# Patient Record
Sex: MALE | Race: WHITE | HISPANIC OR LATINO | Employment: FULL TIME | URBAN - METROPOLITAN AREA
[De-identification: names, ages, dates, MRNs, and addresses within clinical notes are randomized per-mention and may not be internally consistent; named-entity substitution may affect disease eponyms.]

---

## 2020-04-16 ENCOUNTER — APPOINTMENT (OUTPATIENT)
Dept: RADIOLOGY | Facility: MEDICAL CENTER | Age: 60
DRG: 065 | End: 2020-04-16
Attending: EMERGENCY MEDICINE
Payer: OTHER MISCELLANEOUS

## 2020-04-16 ENCOUNTER — HOSPITAL ENCOUNTER (OUTPATIENT)
Dept: RADIOLOGY | Facility: MEDICAL CENTER | Age: 60
End: 2020-04-16
Payer: OTHER MISCELLANEOUS

## 2020-04-16 ENCOUNTER — HOSPITAL ENCOUNTER (INPATIENT)
Facility: MEDICAL CENTER | Age: 60
LOS: 12 days | DRG: 065 | End: 2020-04-28
Attending: EMERGENCY MEDICINE | Admitting: INTERNAL MEDICINE
Payer: OTHER MISCELLANEOUS

## 2020-04-16 DIAGNOSIS — I60.9 SUBARACHNOID HEMORRHAGE (HCC): ICD-10-CM

## 2020-04-16 DIAGNOSIS — I10 HYPERTENSION, UNSPECIFIED TYPE: ICD-10-CM

## 2020-04-16 DIAGNOSIS — E87.1 HYPONATREMIA: ICD-10-CM

## 2020-04-16 PROBLEM — I16.1 HYPERTENSIVE EMERGENCY: Status: ACTIVE | Noted: 2020-04-16

## 2020-04-16 LAB
ALBUMIN SERPL BCP-MCNC: 4.4 G/DL (ref 3.2–4.9)
ALBUMIN/GLOB SERPL: 1.3 G/DL
ALP SERPL-CCNC: 108 U/L (ref 30–99)
ALT SERPL-CCNC: 32 U/L (ref 2–50)
ANION GAP SERPL CALC-SCNC: 14 MMOL/L (ref 7–16)
APTT PPP: 26.8 SEC (ref 24.7–36)
AST SERPL-CCNC: 21 U/L (ref 12–45)
BASOPHILS # BLD AUTO: 0.3 % (ref 0–1.8)
BASOPHILS # BLD: 0.03 K/UL (ref 0–0.12)
BILIRUB SERPL-MCNC: 0.5 MG/DL (ref 0.1–1.5)
BUN SERPL-MCNC: 22 MG/DL (ref 8–22)
CALCIUM SERPL-MCNC: 9.3 MG/DL (ref 8.5–10.5)
CHLORIDE SERPL-SCNC: 101 MMOL/L (ref 96–112)
CO2 SERPL-SCNC: 24 MMOL/L (ref 20–33)
CREAT SERPL-MCNC: 0.77 MG/DL (ref 0.5–1.4)
EOSINOPHIL # BLD AUTO: 0.01 K/UL (ref 0–0.51)
EOSINOPHIL NFR BLD: 0.1 % (ref 0–6.9)
ERYTHROCYTE [DISTWIDTH] IN BLOOD BY AUTOMATED COUNT: 42.3 FL (ref 35.9–50)
GLOBULIN SER CALC-MCNC: 3.3 G/DL (ref 1.9–3.5)
GLUCOSE SERPL-MCNC: 150 MG/DL (ref 65–99)
HCT VFR BLD AUTO: 44.4 % (ref 42–52)
HGB BLD-MCNC: 14.4 G/DL (ref 14–18)
IMM GRANULOCYTES # BLD AUTO: 0.04 K/UL (ref 0–0.11)
IMM GRANULOCYTES NFR BLD AUTO: 0.4 % (ref 0–0.9)
INR PPP: 0.91 (ref 0.87–1.13)
LYMPHOCYTES # BLD AUTO: 0.77 K/UL (ref 1–4.8)
LYMPHOCYTES NFR BLD: 7.8 % (ref 22–41)
MCH RBC QN AUTO: 26.8 PG (ref 27–33)
MCHC RBC AUTO-ENTMCNC: 32.4 G/DL (ref 33.7–35.3)
MCV RBC AUTO: 82.7 FL (ref 81.4–97.8)
MONOCYTES # BLD AUTO: 0.41 K/UL (ref 0–0.85)
MONOCYTES NFR BLD AUTO: 4.1 % (ref 0–13.4)
NEUTROPHILS # BLD AUTO: 8.63 K/UL (ref 1.82–7.42)
NEUTROPHILS NFR BLD: 87.3 % (ref 44–72)
NRBC # BLD AUTO: 0 K/UL
NRBC BLD-RTO: 0 /100 WBC
PLATELET # BLD AUTO: 271 K/UL (ref 164–446)
PMV BLD AUTO: 9.5 FL (ref 9–12.9)
POTASSIUM SERPL-SCNC: 4 MMOL/L (ref 3.6–5.5)
PROT SERPL-MCNC: 7.7 G/DL (ref 6–8.2)
PROTHROMBIN TIME: 12.4 SEC (ref 12–14.6)
RBC # BLD AUTO: 5.37 M/UL (ref 4.7–6.1)
SODIUM SERPL-SCNC: 139 MMOL/L (ref 135–145)
WBC # BLD AUTO: 9.9 K/UL (ref 4.8–10.8)

## 2020-04-16 PROCEDURE — 85025 COMPLETE CBC W/AUTO DIFF WBC: CPT

## 2020-04-16 PROCEDURE — 85610 PROTHROMBIN TIME: CPT

## 2020-04-16 PROCEDURE — 70496 CT ANGIOGRAPHY HEAD: CPT

## 2020-04-16 PROCEDURE — 85347 COAGULATION TIME ACTIVATED: CPT

## 2020-04-16 PROCEDURE — 700111 HCHG RX REV CODE 636 W/ 250 OVERRIDE (IP): Performed by: STUDENT IN AN ORGANIZED HEALTH CARE EDUCATION/TRAINING PROGRAM

## 2020-04-16 PROCEDURE — 99291 CRITICAL CARE FIRST HOUR: CPT | Performed by: INTERNAL MEDICINE

## 2020-04-16 PROCEDURE — 85384 FIBRINOGEN ACTIVITY: CPT

## 2020-04-16 PROCEDURE — 36415 COLL VENOUS BLD VENIPUNCTURE: CPT

## 2020-04-16 PROCEDURE — 700101 HCHG RX REV CODE 250: Performed by: STUDENT IN AN ORGANIZED HEALTH CARE EDUCATION/TRAINING PROGRAM

## 2020-04-16 PROCEDURE — 770022 HCHG ROOM/CARE - ICU (200)

## 2020-04-16 PROCEDURE — 85576 BLOOD PLATELET AGGREGATION: CPT | Mod: 91

## 2020-04-16 PROCEDURE — 70498 CT ANGIOGRAPHY NECK: CPT

## 2020-04-16 PROCEDURE — A9270 NON-COVERED ITEM OR SERVICE: HCPCS | Performed by: STUDENT IN AN ORGANIZED HEALTH CARE EDUCATION/TRAINING PROGRAM

## 2020-04-16 PROCEDURE — 700117 HCHG RX CONTRAST REV CODE 255: Performed by: EMERGENCY MEDICINE

## 2020-04-16 PROCEDURE — 700105 HCHG RX REV CODE 258: Performed by: STUDENT IN AN ORGANIZED HEALTH CARE EDUCATION/TRAINING PROGRAM

## 2020-04-16 PROCEDURE — 700105 HCHG RX REV CODE 258: Performed by: EMERGENCY MEDICINE

## 2020-04-16 PROCEDURE — 80053 COMPREHEN METABOLIC PANEL: CPT

## 2020-04-16 PROCEDURE — 700101 HCHG RX REV CODE 250: Performed by: EMERGENCY MEDICINE

## 2020-04-16 PROCEDURE — 700102 HCHG RX REV CODE 250 W/ 637 OVERRIDE(OP): Performed by: STUDENT IN AN ORGANIZED HEALTH CARE EDUCATION/TRAINING PROGRAM

## 2020-04-16 PROCEDURE — 700111 HCHG RX REV CODE 636 W/ 250 OVERRIDE (IP): Performed by: EMERGENCY MEDICINE

## 2020-04-16 PROCEDURE — 85730 THROMBOPLASTIN TIME PARTIAL: CPT

## 2020-04-16 PROCEDURE — 82962 GLUCOSE BLOOD TEST: CPT

## 2020-04-16 RX ORDER — LABETALOL HYDROCHLORIDE 5 MG/ML
10 INJECTION, SOLUTION INTRAVENOUS
Status: DISCONTINUED | OUTPATIENT
Start: 2020-04-16 | End: 2020-04-16

## 2020-04-16 RX ORDER — DEXTROSE MONOHYDRATE 25 G/50ML
50 INJECTION, SOLUTION INTRAVENOUS
Status: DISCONTINUED | OUTPATIENT
Start: 2020-04-16 | End: 2020-04-19

## 2020-04-16 RX ORDER — POLYETHYLENE GLYCOL 3350 17 G/17G
1 POWDER, FOR SOLUTION ORAL
Status: DISCONTINUED | OUTPATIENT
Start: 2020-04-16 | End: 2020-04-28 | Stop reason: HOSPADM

## 2020-04-16 RX ORDER — ACETAMINOPHEN 325 MG/1
650 TABLET ORAL EVERY 6 HOURS PRN
Status: DISCONTINUED | OUTPATIENT
Start: 2020-04-16 | End: 2020-04-23

## 2020-04-16 RX ORDER — ONDANSETRON 2 MG/ML
4 INJECTION INTRAMUSCULAR; INTRAVENOUS ONCE
Status: COMPLETED | OUTPATIENT
Start: 2020-04-16 | End: 2020-04-16

## 2020-04-16 RX ORDER — HYDRALAZINE HYDROCHLORIDE 20 MG/ML
10 INJECTION INTRAMUSCULAR; INTRAVENOUS EVERY 4 HOURS PRN
Status: DISCONTINUED | OUTPATIENT
Start: 2020-04-16 | End: 2020-04-17

## 2020-04-16 RX ORDER — NIMODIPINE 30 MG/1
60 CAPSULE, LIQUID FILLED ORAL EVERY 4 HOURS
Status: DISCONTINUED | OUTPATIENT
Start: 2020-04-16 | End: 2020-04-19

## 2020-04-16 RX ORDER — LABETALOL HYDROCHLORIDE 5 MG/ML
10 INJECTION, SOLUTION INTRAVENOUS EVERY 4 HOURS PRN
Status: DISCONTINUED | OUTPATIENT
Start: 2020-04-16 | End: 2020-04-17

## 2020-04-16 RX ORDER — ENALAPRILAT 1.25 MG/ML
1.25 INJECTION INTRAVENOUS EVERY 6 HOURS PRN
Status: DISCONTINUED | OUTPATIENT
Start: 2020-04-16 | End: 2020-04-17

## 2020-04-16 RX ORDER — ONDANSETRON 2 MG/ML
4 INJECTION INTRAMUSCULAR; INTRAVENOUS EVERY 4 HOURS PRN
Status: DISCONTINUED | OUTPATIENT
Start: 2020-04-16 | End: 2020-04-28 | Stop reason: HOSPADM

## 2020-04-16 RX ORDER — AMOXICILLIN 250 MG
2 CAPSULE ORAL 2 TIMES DAILY
Status: DISCONTINUED | OUTPATIENT
Start: 2020-04-16 | End: 2020-04-28 | Stop reason: HOSPADM

## 2020-04-16 RX ORDER — BISACODYL 10 MG
10 SUPPOSITORY, RECTAL RECTAL
Status: DISCONTINUED | OUTPATIENT
Start: 2020-04-16 | End: 2020-04-28 | Stop reason: HOSPADM

## 2020-04-16 RX ORDER — IBUPROFEN 200 MG
800 TABLET ORAL
Status: ON HOLD | COMMUNITY
End: 2020-04-19

## 2020-04-16 RX ORDER — SODIUM CHLORIDE 9 MG/ML
INJECTION, SOLUTION INTRAVENOUS CONTINUOUS
Status: DISCONTINUED | OUTPATIENT
Start: 2020-04-16 | End: 2020-04-18

## 2020-04-16 RX ORDER — ONDANSETRON 4 MG/1
4 TABLET, ORALLY DISINTEGRATING ORAL EVERY 4 HOURS PRN
Status: DISCONTINUED | OUTPATIENT
Start: 2020-04-16 | End: 2020-04-28 | Stop reason: HOSPADM

## 2020-04-16 RX ADMIN — LABETALOL HYDROCHLORIDE 10 MG: 5 INJECTION, SOLUTION INTRAVENOUS at 22:27

## 2020-04-16 RX ADMIN — ONDANSETRON 4 MG: 2 INJECTION INTRAMUSCULAR; INTRAVENOUS at 22:25

## 2020-04-16 RX ADMIN — ACETAMINOPHEN 650 MG: 325 TABLET, FILM COATED ORAL at 22:25

## 2020-04-16 RX ADMIN — SODIUM CHLORIDE 5 MG/HR: 9 INJECTION, SOLUTION INTRAVENOUS at 20:46

## 2020-04-16 RX ADMIN — SODIUM CHLORIDE: 9 INJECTION, SOLUTION INTRAVENOUS at 22:20

## 2020-04-16 RX ADMIN — SODIUM CHLORIDE 10 MG/HR: 9 INJECTION, SOLUTION INTRAVENOUS at 21:18

## 2020-04-16 RX ADMIN — SODIUM CHLORIDE 15 MG/HR: 9 INJECTION, SOLUTION INTRAVENOUS at 21:34

## 2020-04-16 RX ADMIN — ONDANSETRON 4 MG: 2 INJECTION INTRAMUSCULAR; INTRAVENOUS at 20:59

## 2020-04-16 RX ADMIN — SODIUM CHLORIDE 15 MG/HR: 9 INJECTION, SOLUTION INTRAVENOUS at 23:10

## 2020-04-16 RX ADMIN — IOHEXOL 80 ML: 350 INJECTION, SOLUTION INTRAVENOUS at 21:23

## 2020-04-16 RX ADMIN — NIMODIPINE 60 MG: 30 CAPSULE ORAL at 23:13

## 2020-04-16 ASSESSMENT — ENCOUNTER SYMPTOMS
DIARRHEA: 0
MYALGIAS: 0
NECK PAIN: 1
PALPITATIONS: 0
ABDOMINAL PAIN: 0
CHILLS: 0
FALLS: 0
SHORTNESS OF BREATH: 0
BLURRED VISION: 0
PHOTOPHOBIA: 0
SEIZURES: 0
COUGH: 0
LOSS OF CONSCIOUSNESS: 0
CONSTIPATION: 0
VOMITING: 1
HEADACHES: 1
SPEECH CHANGE: 0
FOCAL WEAKNESS: 0
SENSORY CHANGE: 0
TREMORS: 0
DIZZINESS: 1
FEVER: 0
NAUSEA: 1
SPUTUM PRODUCTION: 0
SORE THROAT: 0

## 2020-04-16 ASSESSMENT — LIFESTYLE VARIABLES: EVER_SMOKED: NEVER

## 2020-04-17 ENCOUNTER — APPOINTMENT (OUTPATIENT)
Dept: RADIOLOGY | Facility: MEDICAL CENTER | Age: 60
DRG: 065 | End: 2020-04-17
Attending: RADIOLOGY
Payer: OTHER MISCELLANEOUS

## 2020-04-17 PROBLEM — R73.03 PRE-DIABETES: Status: ACTIVE | Noted: 2020-04-17

## 2020-04-17 PROBLEM — F10.10 ALCOHOL ABUSE, DAILY USE: Status: ACTIVE | Noted: 2020-04-17

## 2020-04-17 PROBLEM — E78.5 HYPERLIPIDEMIA: Status: ACTIVE | Noted: 2020-04-17

## 2020-04-17 LAB
ALBUMIN SERPL BCP-MCNC: 4 G/DL (ref 3.2–4.9)
ALBUMIN/GLOB SERPL: 1.4 G/DL
ALP SERPL-CCNC: 99 U/L (ref 30–99)
ALT SERPL-CCNC: 25 U/L (ref 2–50)
ANION GAP SERPL CALC-SCNC: 14 MMOL/L (ref 7–16)
APTT PPP: 28.2 SEC (ref 24.7–36)
AST SERPL-CCNC: 13 U/L (ref 12–45)
BASOPHILS # BLD AUTO: 0.2 % (ref 0–1.8)
BASOPHILS # BLD: 0.02 K/UL (ref 0–0.12)
BILIRUB SERPL-MCNC: 0.4 MG/DL (ref 0.1–1.5)
BUN SERPL-MCNC: 20 MG/DL (ref 8–22)
CALCIUM SERPL-MCNC: 8.6 MG/DL (ref 8.5–10.5)
CFT BLD TEG: 5.4 MIN (ref 5–10)
CFT BLD TEG: 5.4 MIN (ref 5–10)
CHLORIDE SERPL-SCNC: 103 MMOL/L (ref 96–112)
CHOLEST SERPL-MCNC: 204 MG/DL (ref 100–199)
CLOT ANGLE BLD TEG: 59 DEGREES (ref 53–72)
CLOT ANGLE BLD TEG: 62.2 DEGREES (ref 53–72)
CLOT LYSIS 30M P MA LENFR BLD TEG: 0 % (ref 0–8)
CLOT LYSIS 30M P MA LENFR BLD TEG: 0 % (ref 0–8)
CO2 SERPL-SCNC: 22 MMOL/L (ref 20–33)
CREAT SERPL-MCNC: 0.78 MG/DL (ref 0.5–1.4)
CT.EXTRINSIC BLD ROTEM: 2.1 MIN (ref 1–3)
CT.EXTRINSIC BLD ROTEM: 2.4 MIN (ref 1–3)
EOSINOPHIL # BLD AUTO: 0 K/UL (ref 0–0.51)
EOSINOPHIL NFR BLD: 0 % (ref 0–6.9)
ERYTHROCYTE [DISTWIDTH] IN BLOOD BY AUTOMATED COUNT: 40.7 FL (ref 35.9–50)
EST. AVERAGE GLUCOSE BLD GHB EST-MCNC: 134 MG/DL
GLOBULIN SER CALC-MCNC: 2.8 G/DL (ref 1.9–3.5)
GLUCOSE BLD-MCNC: 122 MG/DL (ref 65–99)
GLUCOSE BLD-MCNC: 132 MG/DL (ref 65–99)
GLUCOSE BLD-MCNC: 156 MG/DL (ref 65–99)
GLUCOSE BLD-MCNC: 180 MG/DL (ref 65–99)
GLUCOSE BLD-MCNC: 196 MG/DL (ref 65–99)
GLUCOSE SERPL-MCNC: 209 MG/DL (ref 65–99)
HBA1C MFR BLD: 6.3 % (ref 0–5.6)
HCT VFR BLD AUTO: 40.3 % (ref 42–52)
HDLC SERPL-MCNC: 47 MG/DL
HGB BLD-MCNC: 13.3 G/DL (ref 14–18)
IMM GRANULOCYTES # BLD AUTO: 0.03 K/UL (ref 0–0.11)
IMM GRANULOCYTES NFR BLD AUTO: 0.3 % (ref 0–0.9)
INR PPP: 0.99 (ref 0.87–1.13)
LDLC SERPL CALC-MCNC: 143 MG/DL
LYMPHOCYTES # BLD AUTO: 0.58 K/UL (ref 1–4.8)
LYMPHOCYTES NFR BLD: 6 % (ref 22–41)
MAGNESIUM SERPL-MCNC: 1.7 MG/DL (ref 1.5–2.5)
MCF BLD TEG: 63.3 MM (ref 50–70)
MCF BLD TEG: 65 MM (ref 50–70)
MCH RBC QN AUTO: 26.8 PG (ref 27–33)
MCHC RBC AUTO-ENTMCNC: 33 G/DL (ref 33.7–35.3)
MCV RBC AUTO: 81.3 FL (ref 81.4–97.8)
MONOCYTES # BLD AUTO: 0.52 K/UL (ref 0–0.85)
MONOCYTES NFR BLD AUTO: 5.4 % (ref 0–13.4)
NEUTROPHILS # BLD AUTO: 8.52 K/UL (ref 1.82–7.42)
NEUTROPHILS NFR BLD: 88.1 % (ref 44–72)
NRBC # BLD AUTO: 0 K/UL
NRBC BLD-RTO: 0 /100 WBC
PA AA BLD-ACNC: 22 %
PA ADP BLD-ACNC: 1.3 %
PLATELET # BLD AUTO: 252 K/UL (ref 164–446)
PMV BLD AUTO: 9.3 FL (ref 9–12.9)
POTASSIUM SERPL-SCNC: 4 MMOL/L (ref 3.6–5.5)
PROT SERPL-MCNC: 6.8 G/DL (ref 6–8.2)
PROTHROMBIN TIME: 13.3 SEC (ref 12–14.6)
RBC # BLD AUTO: 4.96 M/UL (ref 4.7–6.1)
SODIUM SERPL-SCNC: 139 MMOL/L (ref 135–145)
TEG ALGORITHM TGALG: NORMAL
TEG ALGORITHM TGALG: NORMAL
TRIGL SERPL-MCNC: 68 MG/DL (ref 0–149)
WBC # BLD AUTO: 9.7 K/UL (ref 4.8–10.8)

## 2020-04-17 PROCEDURE — 85610 PROTHROMBIN TIME: CPT

## 2020-04-17 PROCEDURE — 700101 HCHG RX REV CODE 250: Performed by: EMERGENCY MEDICINE

## 2020-04-17 PROCEDURE — 83735 ASSAY OF MAGNESIUM: CPT

## 2020-04-17 PROCEDURE — A9270 NON-COVERED ITEM OR SERVICE: HCPCS | Performed by: STUDENT IN AN ORGANIZED HEALTH CARE EDUCATION/TRAINING PROGRAM

## 2020-04-17 PROCEDURE — 96375 TX/PRO/DX INJ NEW DRUG ADDON: CPT

## 2020-04-17 PROCEDURE — 83036 HEMOGLOBIN GLYCOSYLATED A1C: CPT

## 2020-04-17 PROCEDURE — 700105 HCHG RX REV CODE 258: Performed by: STUDENT IN AN ORGANIZED HEALTH CARE EDUCATION/TRAINING PROGRAM

## 2020-04-17 PROCEDURE — B31F1ZZ FLUOROSCOPY OF LEFT VERTEBRAL ARTERY USING LOW OSMOLAR CONTRAST: ICD-10-PCS | Performed by: RADIOLOGY

## 2020-04-17 PROCEDURE — 99291 CRITICAL CARE FIRST HOUR: CPT

## 2020-04-17 PROCEDURE — 700111 HCHG RX REV CODE 636 W/ 250 OVERRIDE (IP)

## 2020-04-17 PROCEDURE — 700105 HCHG RX REV CODE 258: Performed by: INTERNAL MEDICINE

## 2020-04-17 PROCEDURE — 96366 THER/PROPH/DIAG IV INF ADDON: CPT

## 2020-04-17 PROCEDURE — 92523 SPEECH SOUND LANG COMPREHEN: CPT

## 2020-04-17 PROCEDURE — 85384 FIBRINOGEN ACTIVITY: CPT

## 2020-04-17 PROCEDURE — 80053 COMPREHEN METABOLIC PANEL: CPT

## 2020-04-17 PROCEDURE — 700111 HCHG RX REV CODE 636 W/ 250 OVERRIDE (IP): Performed by: STUDENT IN AN ORGANIZED HEALTH CARE EDUCATION/TRAINING PROGRAM

## 2020-04-17 PROCEDURE — 700102 HCHG RX REV CODE 250 W/ 637 OVERRIDE(OP): Performed by: INTERNAL MEDICINE

## 2020-04-17 PROCEDURE — 80061 LIPID PANEL: CPT

## 2020-04-17 PROCEDURE — 700111 HCHG RX REV CODE 636 W/ 250 OVERRIDE (IP): Performed by: INTERNAL MEDICINE

## 2020-04-17 PROCEDURE — 99153 MOD SED SAME PHYS/QHP EA: CPT

## 2020-04-17 PROCEDURE — 700101 HCHG RX REV CODE 250: Performed by: INTERNAL MEDICINE

## 2020-04-17 PROCEDURE — 82962 GLUCOSE BLOOD TEST: CPT

## 2020-04-17 PROCEDURE — B3181ZZ FLUOROSCOPY OF BILATERAL INTERNAL CAROTID ARTERIES USING LOW OSMOLAR CONTRAST: ICD-10-PCS | Performed by: RADIOLOGY

## 2020-04-17 PROCEDURE — 96376 TX/PRO/DX INJ SAME DRUG ADON: CPT

## 2020-04-17 PROCEDURE — 96365 THER/PROPH/DIAG IV INF INIT: CPT

## 2020-04-17 PROCEDURE — 85025 COMPLETE CBC W/AUTO DIFF WBC: CPT

## 2020-04-17 PROCEDURE — 700105 HCHG RX REV CODE 258: Performed by: EMERGENCY MEDICINE

## 2020-04-17 PROCEDURE — 700102 HCHG RX REV CODE 250 W/ 637 OVERRIDE(OP): Performed by: STUDENT IN AN ORGANIZED HEALTH CARE EDUCATION/TRAINING PROGRAM

## 2020-04-17 PROCEDURE — A9270 NON-COVERED ITEM OR SERVICE: HCPCS | Performed by: INTERNAL MEDICINE

## 2020-04-17 PROCEDURE — 85347 COAGULATION TIME ACTIVATED: CPT

## 2020-04-17 PROCEDURE — 99291 CRITICAL CARE FIRST HOUR: CPT | Performed by: INTERNAL MEDICINE

## 2020-04-17 PROCEDURE — 85730 THROMBOPLASTIN TIME PARTIAL: CPT

## 2020-04-17 PROCEDURE — 700117 HCHG RX CONTRAST REV CODE 255: Performed by: RADIOLOGY

## 2020-04-17 PROCEDURE — 85576 BLOOD PLATELET AGGREGATION: CPT

## 2020-04-17 PROCEDURE — 700111 HCHG RX REV CODE 636 W/ 250 OVERRIDE (IP): Performed by: RADIOLOGY

## 2020-04-17 PROCEDURE — 770022 HCHG ROOM/CARE - ICU (200)

## 2020-04-17 RX ORDER — HYDROMORPHONE HYDROCHLORIDE 1 MG/ML
.5-1 INJECTION, SOLUTION INTRAMUSCULAR; INTRAVENOUS; SUBCUTANEOUS
Status: DISCONTINUED | OUTPATIENT
Start: 2020-04-17 | End: 2020-04-19

## 2020-04-17 RX ORDER — MIDAZOLAM HYDROCHLORIDE 1 MG/ML
.5-2 INJECTION INTRAMUSCULAR; INTRAVENOUS PRN
Status: DISCONTINUED | OUTPATIENT
Start: 2020-04-17 | End: 2020-04-17 | Stop reason: HOSPADM

## 2020-04-17 RX ORDER — CALCIUM CARBONATE 500 MG/1
1000 TABLET, CHEWABLE ORAL EVERY 6 HOURS PRN
Status: DISCONTINUED | OUTPATIENT
Start: 2020-04-17 | End: 2020-04-28 | Stop reason: HOSPADM

## 2020-04-17 RX ORDER — HYDRALAZINE HYDROCHLORIDE 20 MG/ML
10 INJECTION INTRAMUSCULAR; INTRAVENOUS EVERY 4 HOURS PRN
Status: DISCONTINUED | OUTPATIENT
Start: 2020-04-17 | End: 2020-04-17

## 2020-04-17 RX ORDER — ONDANSETRON 2 MG/ML
4 INJECTION INTRAMUSCULAR; INTRAVENOUS PRN
Status: DISCONTINUED | OUTPATIENT
Start: 2020-04-17 | End: 2020-04-17 | Stop reason: HOSPADM

## 2020-04-17 RX ORDER — OMEPRAZOLE 20 MG/1
20 CAPSULE, DELAYED RELEASE ORAL 2 TIMES DAILY
Status: COMPLETED | OUTPATIENT
Start: 2020-04-17 | End: 2020-04-18

## 2020-04-17 RX ORDER — LABETALOL HYDROCHLORIDE 5 MG/ML
10 INJECTION, SOLUTION INTRAVENOUS
Status: DISCONTINUED | OUTPATIENT
Start: 2020-04-17 | End: 2020-04-18

## 2020-04-17 RX ORDER — LABETALOL HYDROCHLORIDE 5 MG/ML
10 INJECTION, SOLUTION INTRAVENOUS EVERY 4 HOURS PRN
Status: DISCONTINUED | OUTPATIENT
Start: 2020-04-17 | End: 2020-04-17

## 2020-04-17 RX ORDER — ENALAPRILAT 1.25 MG/ML
1.25 INJECTION INTRAVENOUS EVERY 6 HOURS PRN
Status: DISCONTINUED | OUTPATIENT
Start: 2020-04-17 | End: 2020-04-18

## 2020-04-17 RX ORDER — MIDAZOLAM HYDROCHLORIDE 1 MG/ML
INJECTION INTRAMUSCULAR; INTRAVENOUS
Status: COMPLETED
Start: 2020-04-17 | End: 2020-04-17

## 2020-04-17 RX ORDER — MAGNESIUM SULFATE HEPTAHYDRATE 40 MG/ML
2 INJECTION, SOLUTION INTRAVENOUS ONCE
Status: COMPLETED | OUTPATIENT
Start: 2020-04-17 | End: 2020-04-17

## 2020-04-17 RX ORDER — ENALAPRILAT 1.25 MG/ML
1.25 INJECTION INTRAVENOUS EVERY 6 HOURS PRN
Status: DISCONTINUED | OUTPATIENT
Start: 2020-04-17 | End: 2020-04-17

## 2020-04-17 RX ORDER — HYDRALAZINE HYDROCHLORIDE 20 MG/ML
10 INJECTION INTRAMUSCULAR; INTRAVENOUS
Status: DISCONTINUED | OUTPATIENT
Start: 2020-04-17 | End: 2020-04-18

## 2020-04-17 RX ORDER — SODIUM CHLORIDE 9 MG/ML
500 INJECTION, SOLUTION INTRAVENOUS
Status: DISCONTINUED | OUTPATIENT
Start: 2020-04-17 | End: 2020-04-17 | Stop reason: HOSPADM

## 2020-04-17 RX ADMIN — SODIUM CHLORIDE 15 MG/HR: 9 INJECTION, SOLUTION INTRAVENOUS at 00:56

## 2020-04-17 RX ADMIN — FENTANYL CITRATE 25 MCG: 50 INJECTION, SOLUTION INTRAMUSCULAR; INTRAVENOUS at 16:10

## 2020-04-17 RX ADMIN — SENNOSIDES AND DOCUSATE SODIUM 2 TABLET: 8.6; 5 TABLET ORAL at 17:47

## 2020-04-17 RX ADMIN — FENTANYL CITRATE 25 MCG: 50 INJECTION, SOLUTION INTRAMUSCULAR; INTRAVENOUS at 15:52

## 2020-04-17 RX ADMIN — SODIUM CHLORIDE 15 MG/HR: 9 INJECTION, SOLUTION INTRAVENOUS at 09:05

## 2020-04-17 RX ADMIN — SODIUM CHLORIDE 15 MG/HR: 9 INJECTION, SOLUTION INTRAVENOUS at 12:58

## 2020-04-17 RX ADMIN — HYDRALAZINE HYDROCHLORIDE 10 MG: 20 INJECTION INTRAMUSCULAR; INTRAVENOUS at 00:56

## 2020-04-17 RX ADMIN — ACETAMINOPHEN 650 MG: 325 TABLET, FILM COATED ORAL at 20:38

## 2020-04-17 RX ADMIN — SODIUM CHLORIDE 15 MG/HR: 9 INJECTION, SOLUTION INTRAVENOUS at 21:15

## 2020-04-17 RX ADMIN — LABETALOL HYDROCHLORIDE 10 MG: 5 INJECTION, SOLUTION INTRAVENOUS at 14:16

## 2020-04-17 RX ADMIN — NIMODIPINE 60 MG: 30 CAPSULE ORAL at 09:44

## 2020-04-17 RX ADMIN — SODIUM CHLORIDE 15 MG/HR: 9 INJECTION, SOLUTION INTRAVENOUS at 11:05

## 2020-04-17 RX ADMIN — ANTACID TABLETS 1000 MG: 500 TABLET, CHEWABLE ORAL at 07:34

## 2020-04-17 RX ADMIN — MIDAZOLAM HYDROCHLORIDE 2 MG: 1 INJECTION, SOLUTION INTRAMUSCULAR; INTRAVENOUS at 15:52

## 2020-04-17 RX ADMIN — SODIUM CHLORIDE: 9 INJECTION, SOLUTION INTRAVENOUS at 00:30

## 2020-04-17 RX ADMIN — MIDAZOLAM HYDROCHLORIDE 1 MG: 1 INJECTION, SOLUTION INTRAMUSCULAR; INTRAVENOUS at 16:10

## 2020-04-17 RX ADMIN — HYDRALAZINE HYDROCHLORIDE 10 MG: 20 INJECTION INTRAMUSCULAR; INTRAVENOUS at 20:39

## 2020-04-17 RX ADMIN — NIMODIPINE 60 MG: 30 CAPSULE ORAL at 13:41

## 2020-04-17 RX ADMIN — SODIUM CHLORIDE 15 MG/HR: 9 INJECTION, SOLUTION INTRAVENOUS at 17:45

## 2020-04-17 RX ADMIN — NIMODIPINE 60 MG: 30 CAPSULE ORAL at 21:35

## 2020-04-17 RX ADMIN — NIMODIPINE 60 MG: 30 CAPSULE ORAL at 02:04

## 2020-04-17 RX ADMIN — ANTACID TABLETS 1000 MG: 500 TABLET, CHEWABLE ORAL at 02:52

## 2020-04-17 RX ADMIN — ONDANSETRON 4 MG: 2 INJECTION INTRAMUSCULAR; INTRAVENOUS at 20:38

## 2020-04-17 RX ADMIN — NIMODIPINE 60 MG: 30 CAPSULE ORAL at 06:04

## 2020-04-17 RX ADMIN — SODIUM CHLORIDE 15 MG/HR: 9 INJECTION, SOLUTION INTRAVENOUS at 04:49

## 2020-04-17 RX ADMIN — NIMODIPINE 60 MG: 30 CAPSULE ORAL at 17:45

## 2020-04-17 RX ADMIN — ACETAMINOPHEN 650 MG: 325 TABLET, FILM COATED ORAL at 07:34

## 2020-04-17 RX ADMIN — LABETALOL HYDROCHLORIDE 10 MG: 5 INJECTION, SOLUTION INTRAVENOUS at 09:32

## 2020-04-17 RX ADMIN — HYDRALAZINE HYDROCHLORIDE 10 MG: 20 INJECTION INTRAMUSCULAR; INTRAVENOUS at 10:23

## 2020-04-17 RX ADMIN — SODIUM CHLORIDE: 9 INJECTION, SOLUTION INTRAVENOUS at 11:06

## 2020-04-17 RX ADMIN — INSULIN LISPRO 1 UNITS: 100 INJECTION, SOLUTION INTRAVENOUS; SUBCUTANEOUS at 06:05

## 2020-04-17 RX ADMIN — INSULIN LISPRO 1 UNITS: 100 INJECTION, SOLUTION INTRAVENOUS; SUBCUTANEOUS at 03:00

## 2020-04-17 RX ADMIN — SODIUM CHLORIDE 15 MG/HR: 9 INJECTION, SOLUTION INTRAVENOUS at 02:50

## 2020-04-17 RX ADMIN — IOHEXOL 65 ML: 300 INJECTION, SOLUTION INTRAVENOUS at 16:38

## 2020-04-17 RX ADMIN — SODIUM CHLORIDE 12.5 MG/HR: 9 INJECTION, SOLUTION INTRAVENOUS at 07:17

## 2020-04-17 RX ADMIN — LABETALOL HYDROCHLORIDE 10 MG: 5 INJECTION, SOLUTION INTRAVENOUS at 21:35

## 2020-04-17 RX ADMIN — MAGNESIUM SULFATE IN WATER 2 G: 40 INJECTION, SOLUTION INTRAVENOUS at 11:05

## 2020-04-17 RX ADMIN — OMEPRAZOLE 20 MG: 20 CAPSULE, DELAYED RELEASE ORAL at 21:02

## 2020-04-17 RX ADMIN — ACETAMINOPHEN 650 MG: 325 TABLET, FILM COATED ORAL at 13:41

## 2020-04-17 RX ADMIN — ONDANSETRON 4 MG: 2 INJECTION INTRAMUSCULAR; INTRAVENOUS at 07:19

## 2020-04-17 ASSESSMENT — LIFESTYLE VARIABLES
TOTAL SCORE: 0
CONSUMPTION TOTAL: NEGATIVE
TOTAL SCORE: 0
DOES PATIENT WANT TO STOP DRINKING: NO
EVER HAD A DRINK FIRST THING IN THE MORNING TO STEADY YOUR NERVES TO GET RID OF A HANGOVER: NO
ALCOHOL_USE: YES
HAVE YOU EVER FELT YOU SHOULD CUT DOWN ON YOUR DRINKING: NO
AVERAGE NUMBER OF DAYS PER WEEK YOU HAVE A DRINK CONTAINING ALCOHOL: 5
HOW MANY TIMES IN THE PAST YEAR HAVE YOU HAD 5 OR MORE DRINKS IN A DAY: 0
ON A TYPICAL DAY WHEN YOU DRINK ALCOHOL HOW MANY DRINKS DO YOU HAVE: 1
EVER FELT BAD OR GUILTY ABOUT YOUR DRINKING: NO
HAVE PEOPLE ANNOYED YOU BY CRITICIZING YOUR DRINKING: NO
TOTAL SCORE: 0

## 2020-04-17 ASSESSMENT — FIBROSIS 4 INDEX: FIB4 SCORE: 0.81

## 2020-04-17 ASSESSMENT — COGNITIVE AND FUNCTIONAL STATUS - GENERAL
DAILY ACTIVITIY SCORE: 24
SUGGESTED CMS G CODE MODIFIER DAILY ACTIVITY: CH
SUGGESTED CMS G CODE MODIFIER MOBILITY: CH
MOBILITY SCORE: 24

## 2020-04-17 ASSESSMENT — ENCOUNTER SYMPTOMS
SPEECH CHANGE: 0
SORE THROAT: 0
NECK PAIN: 1
COUGH: 0
HEADACHES: 1
NAUSEA: 1
ABDOMINAL PAIN: 0
HEADACHES: 0
PHOTOPHOBIA: 0
MYALGIAS: 0
SENSORY CHANGE: 0
DIZZINESS: 0
BLURRED VISION: 0
BRUISES/BLEEDS EASILY: 0
ORTHOPNEA: 0
NERVOUS/ANXIOUS: 0
CHILLS: 0
VOMITING: 1
PALPITATIONS: 0
SHORTNESS OF BREATH: 0
WEAKNESS: 0
FEVER: 0
DEPRESSION: 0
DOUBLE VISION: 0
SEIZURES: 0
LOSS OF CONSCIOUSNESS: 0
DIARRHEA: 0
NECK PAIN: 0
VOMITING: 0
NAUSEA: 0
FOCAL WEAKNESS: 0
STRIDOR: 0
SINUS PAIN: 0
BACK PAIN: 0

## 2020-04-17 ASSESSMENT — PATIENT HEALTH QUESTIONNAIRE - PHQ9
2. FEELING DOWN, DEPRESSED, IRRITABLE, OR HOPELESS: NOT AT ALL
1. LITTLE INTEREST OR PLEASURE IN DOING THINGS: NOT AT ALL
SUM OF ALL RESPONSES TO PHQ9 QUESTIONS 1 AND 2: 0

## 2020-04-17 NOTE — THERAPY
Occupational Therapy Contact Note:    OT orders received, pt w/ bedrest orders. Will hold evaluation until bedrest orders have been discontinued per policy.    Lacy Gooden,  Pager: [phone]

## 2020-04-17 NOTE — H&P
History & Physical Note    Date of Admission: 2020  Admission Status: Inpatient  [facility] Team: [facility] Gold [facility]  Attending: Atul Tapia M.D.   Senior Resident: Dr. Fajardo  Contact Number: [phone]    Chief Complaint: Neck stiffness, headache and vomiting    History of Present Illness (HPI):   J Luis is a 60 y.o. male who was transferred from [facility] where he was diagnosed with subarachnoid hemorrhage on noncontrast CT scan.  Patient reports that he developed sudden neck stiffness and posterior headache associated with nausea and 3-4 episodes of vomiting around 5 PM in the evening.    Patient denies prior history of headaches, denies trauma, loss of consciousness, vision changes, seizure-like activity, loss of bowel or bladder control, syncope, chest pain, palpitations, shortness of breath.  He reports lethargy and some dizziness and denied any focal motor or sensory deficits.  He has a history of hypertension, which was being managed with lifestyle modifications and is not on any antihypertensive meds at home.  He recently (2 weeks ago) started using aspirin 81 mg daily, otherwise he is on not on any other antiplatelet or anticoagulation medications.    Patient has never smoked, drinks alcohol (hard liquor) every night and uses marijuana occasionally.  Family history significant for aneurysms in his father, who  of a brain aneurysm at the age of 63.    At the time of presentation, in the ER, he was hemodynamically stable, however his blood pressure was elevated at 188-193/1 35-85 mmHg.  He received Zofran and nicardipine drip was started in the ER.  CTA of the head and neck showed no signs of aneurysm.  Neurosurgery was consulted by the ER physician, who did recommended no external ventricular drain at this time given good neurological condition and angio tomorrow.       Review of Systems:   Review of Systems   Constitutional: Positive for malaise/fatigue (lethargy ). Negative for chills  and fever.   HENT: Negative for sore throat and tinnitus.    Eyes: Negative for blurred vision and photophobia.   Respiratory: Negative for cough, sputum production and shortness of breath.    Cardiovascular: Negative for chest pain and palpitations.   Gastrointestinal: Positive for nausea and vomiting. Negative for abdominal pain, constipation and diarrhea.   Genitourinary: Negative for dysuria and hematuria.   Musculoskeletal: Positive for neck pain. Negative for falls and myalgias.   Skin: Negative for rash.   Neurological: Positive for dizziness and headaches. Negative for tremors, sensory change, speech change, focal weakness, seizures and loss of consciousness.       Past Medical History:   Past Medical History was reviewed with patient.   has a past medical history of Hypertension.    Past Surgical History: Past Surgical History was reviewed with patient.   has no past surgical history on file.    Medications: Medications have been reviewed with patient.  Prior to Admission Medications   Prescriptions Last Dose Informant Patient Reported? Taking?   aspirin EC (ECOTRIN) 81 MG Tablet Delayed Response 4/16/2020 at AM Patient Yes Yes   Sig: Take 81 mg by mouth every day.   ibuprofen (MOTRIN) 200 MG Tab 4/16/2020 at AM Patient Yes Yes   Sig: Take 800 mg by mouth every bedtime.      Facility-Administered Medications: None        Allergies: Allergies have been reviewed with patient.  No Known Allergies    Family History:   family history includes No Known Problems in his mother; Stroke (age of onset: 63) in his father.     Social History:   Tobacco: Never  Alcohol: 1-2 hard liquor every night  Recreational drugs (illegal and prescription): Marijuana occasionally    Primary Care Provider: reviewed No primary care provider on file.    Physical Exam:   Vitals:  Temp:  [36.4 °C (97.5 °F)] 36.4 °C (97.5 °F)  Pulse:  [68-82] 68  Resp:  [12-34] 25  BP: (153-213)/() 153/67  SpO2:  [92 %-97 %] 94 %    Physical  Exam  Constitutional:       General: He is not in acute distress.     Appearance: Normal appearance.   HENT:      Head: Normocephalic and atraumatic.      Mouth/Throat:      Mouth: Mucous membranes are moist.   Eyes:      Extraocular Movements: Extraocular movements intact.      Conjunctiva/sclera: Conjunctivae normal.      Pupils: Pupils are equal, round, and reactive to light.   Neck:      Musculoskeletal: Neck rigidity present.   Cardiovascular:      Rate and Rhythm: Normal rate and regular rhythm.      Pulses: Normal pulses.      Heart sounds: Normal heart sounds. No murmur.   Pulmonary:      Effort: Pulmonary effort is normal. No respiratory distress.      Breath sounds: Normal breath sounds. No wheezing.   Abdominal:      General: Abdomen is flat. Bowel sounds are normal. There is no distension.      Palpations: Abdomen is soft.      Tenderness: There is no abdominal tenderness.   Musculoskeletal: Normal range of motion.         General: No deformity or signs of injury.   Skin:     General: Skin is warm.      Coloration: Skin is not jaundiced.   Neurological:      General: No focal deficit present.      Mental Status: He is alert and oriented to person, place, and time.      Cranial Nerves: No cranial nerve deficit.      Sensory: No sensory deficit.      Motor: No weakness.      Deep Tendon Reflexes: Reflexes normal.   Psychiatric:         Mood and Affect: Mood normal.         Thought Content: Thought content normal.         Judgment: Judgment normal.         Labs:   Lab Results   Component Value Date/Time    SODIUM 139 04/16/2020 08:27 PM    POTASSIUM 4.0 04/16/2020 08:27 PM    CHLORIDE 101 04/16/2020 08:27 PM    CO2 24 04/16/2020 08:27 PM    GLUCOSE 150 (H) 04/16/2020 08:27 PM    BUN 22 04/16/2020 08:27 PM    CREATININE 0.77 04/16/2020 08:27 PM      Lab Results   Component Value Date/Time    WBC 9.9 04/16/2020 08:27 PM    RBC 5.37 04/16/2020 08:27 PM    HEMOGLOBIN 14.4 04/16/2020 08:27 PM    HEMATOCRIT 44.4  04/16/2020 08:27 PM    MCV 82.7 04/16/2020 08:27 PM    MCH 26.8 (L) 04/16/2020 08:27 PM    MCHC 32.4 (L) 04/16/2020 08:27 PM    MPV 9.5 04/16/2020 08:27 PM    NEUTSPOLYS 87.30 (H) 04/16/2020 08:27 PM    LYMPHOCYTES 7.80 (L) 04/16/2020 08:27 PM    MONOCYTES 4.10 04/16/2020 08:27 PM    EOSINOPHILS 0.10 04/16/2020 08:27 PM    BASOPHILS 0.30 04/16/2020 08:27 PM      Lab Results   Component Value Date/Time    PROTHROMBTM 12.4 04/16/2020 08:27 PM    INR 0.91 04/16/2020 08:27 PM        EKG: not done     Imaging:    CT-CTA NECK WITH & W/O-POST PROCESSING   Final Result      Mild carotid and vertebral artery atherosclerosis without significant stenosis, aneurysm or occlusion      Mild bronchial wall thickening and groundglass could indicate edema. Reactive airways disease/infection is not excluded      CT-CTA HEAD WITH & W/O-POST PROCESS   Final Result      CT angiogram of the [place] is unable to identify source of subarachnoid hemorrhage      Slight new visualization of acute subarachnoid hemorrhage in the lateral ventricles and there is slight temporal lobe dilatation. Attention in follow-up is advised      Otherwise stable moderate acute subarachnoid hemorrhage filling the basal cisterns as above      OUTSIDE IMAGES-CT HEAD   Final Result            Previous Data Review: reviewed    * Subarachnoid hemorrhage (HCC)- (present on admission)  Overview  -Patient presented with neck stiffness, headache and vomiting  - Noncontrast CT of the head at the outside hospital showed signs of subarachnoid hemorrhage  - No focal neurological deficits or seizure-like activity at the time of presentation   - History of hypertension being treated with lifestyle modification, is NOT on any antihypertensive medications at home.  - Blood pressure at the time of presentation at 188/135 mmHg  - Not on any anticoagulants at home, except for aspirin 81 mg that was started 2 weeks ago.  - CTA of the head and neck showed no signs of  aneurysm    Assessment & Plan  Plan:  - Patient started on nicardipine drip in the ER  - Continue nicardipine drip. Goal BP= SBP <160 and MAP <110   - Telemetry monitoring and neurochecks every 2 hours  - Neurosurgery consulted, recommend no external ventricular drainage as patient is neurologically intact, nimodipine with goal SBP <160 mmHg .  - Started patient on nimodipine 60 mg every 4 hours  - PRN labetalol, hydralazine and enalapril for SBP >150 mmHg in place.   - PT/INR WNL, check TEG scan  - Bedrest with aspiration and seizure precautions  - Oxygen supplementation continuous pulse oximetry  - N.p.o. at midnight for Angio tomorrow (4/17/2020) per neurosurgery  - SCDs for DVT prophylaxis  - Zofran for nausea/vomiting.   - Lipid profile and HbA1c in the AM  - Repeat PT/INR in the a.m.              Hypertensive emergency- (present on admission)  Assessment & Plan  - Patient has a history of hypertension, which is being managed with lifestyle modifications.  He is is not on any antihypertensive medications at home.  - BP at the time of presentation was at 188/135 mmHg.    Plan:  - Continue nicardipine drip  - PRN labetalol, hydralazine and enalaprilat for SBP >150 mmHg  - Nimodipine 60 mg every 4 hours  - Check vitals every 2 hours

## 2020-04-17 NOTE — PROGRESS NOTES
2 RN skin check complete with Ani, RN  Devices in place BP cuff, tele leads, spo2 monitor, nasal cannula.  Skin assessed under devices yes.  Confirmed pressure ulcers found on N/A.  New potential pressure ulcers noted on N/A.   The following interventions in place encouraging turns and mobility, pillows for support, ear pads for nasal cannula .

## 2020-04-17 NOTE — ASSESSMENT & PLAN NOTE
Plan:  - Patient started on nicardipine drip in the ER  - Continue nicardipine drip. Goal BP= SBP <160 and MAP <110   - Telemetry monitoring and neurochecks every 2 hours  - Neurosurgery consulted, recommend no external ventricular drainage as patient is neurologically intact, nimodipine with goal SBP <160 mmHg .  - Started patient on nimodipine 60 mg every 4 hours  - PRN labetalol, hydralazine and enalapril for SBP >150 mmHg in place.   - PT/INR WNL, check TEG scan  - Bedrest with aspiration and seizure precautions  - Oxygen supplementation continuous pulse oximetry  - N.p.o. at midnight for Angio tomorrow (4/17/2020) per neurosurgery  - SCDs for DVT prophylaxis  - Zofran for nausea/vomiting.   - Lipid profile and HbA1c in the AM  - Repeat PT/INR in the a.m.

## 2020-04-17 NOTE — THERAPY
Physical Therapy Contact Note     PT orders received and acknowledged. Pt on hold for PT until bedrest orders are discontinued.     Danisha Gaming PT   [phone]

## 2020-04-17 NOTE — ED PROVIDER NOTES
ED Provider Note    Scribed for Atul Tapia M.D. by Elmira De La Cruz. 4/16/2020,  8:25 PM.    Means of Arrival: EMS  History obtained from: Patient  History limited by: None     CHIEF COMPLAINT  Chief Complaint   Patient presents with   • Headache     Posterior HA started at 1730 today with posterior neck pain   • Neck Pain     with associated nausea       HPI  J Luis Rae is a 60 y.o. male with a history of a-fib and hypertension who presents to the Emergency Department as a transfer from [facility] for complaints of severe headache onset this morning. He describes the onset as sudden and notes that he has not similar symptoms. He reports that he had a very stressful work call this morning, and once it ws done, he had a sudden onset of posterior neck pain which radiated into his head and forehead. He reports associated nausea. He states he sat down and put ice on his head with no alleviation from pain. Denies use of any blood thinners. Currently he states that pain has somewhat improved. Pain is 1-2/10 severity when he sits still, however when he moves his head his neck pain is 5/10. He reports family history of aneurysms. Patient started taking baby aspirin once daily starting a week ago. Negative for fever, chest pain, or shortness of breath.     Patient was transferred from [facility] after CT revealed subarachnoid hemorrhage involving the region of the heckcn-xk-Mtecqc and pituitary region. Subarachnoid blood tracking along the tentorium inferiorly and superiorly. Subarachnoid blood tracker anterior to the brainstem and cervical cord. Here is no evidence for acute ischemic changes.     REVIEW OF SYSTEMS  CONSTITUTIONAL:  No fever.  CARDIOVASCULAR:  No chest discomfort.  RESPIRATORY:  No pleuritic chest pain.  GASTROINTESTINAL:  No abdominal pain.  GENITOURINARY:   No dysuria.  MUSCULOSKELETAL:  No arthralgia.  SKIN:  No rash or suspicious lesions.  NEUROLOGIC:   No  "headache.  ENDOCRINE:  No facial edema.  HEMATOLOGIC:  No abnormal bleeding.     See HPI for further details.   All other systems are negative.     PAST MEDICAL HISTORY  No past medical history on file.    FAMILY HISTORY  No family history on file.    SOCIAL HISTORY       SURGICAL HISTORY  No past surgical history on file.    CURRENT MEDICATIONS  Baby Aspirin 1 pill daily.      ALLERGIES  No Known Allergies    PHYSICAL EXAM  VITAL SIGNS: BP (!) 188/135   Pulse 73   Temp 36.4 °C (97.5 °F) (Temporal)   Resp 18   Ht 1.93 m (6' 4\")   Wt 117.9 kg (260 lb)   SpO2 97%   BMI 31.65 kg/m²    Gen: Alert, no acute distress.   HEENT: ATNC  Eyes: PERRL, EOMI, normal conjunctiva.   Neck: trachea midline  Resp: no respiratory distress, lungs clear bilaterally.   Cardiac: regular rate and rhythm.   CV: No JVD regular rate and rhythm  Abd: non-distended, nontender  Ext: No deformities  Psych: normal mood  Neuro: speech fluent. Alert and awake with GCS 15, no focal deficits, moves all extremities spontaneously.      DIAGNOSTIC STUDIES / PROCEDURES     LABS  Labs Reviewed   CBC WITH DIFFERENTIAL - Abnormal; Notable for the following components:       Result Value    MCH 26.8 (*)     MCHC 32.4 (*)     Neutrophils-Polys 87.30 (*)     Lymphocytes 7.80 (*)     Neutrophils (Absolute) 8.63 (*)     Lymphs (Absolute) 0.77 (*)     All other components within normal limits    Narrative:     Indicate which anticoagulants the patient is on:->UNKNOWN   COMP METABOLIC PANEL - Abnormal; Notable for the following components:    Glucose 150 (*)     Alkaline Phosphatase 108 (*)     All other components within normal limits    Narrative:     Indicate which anticoagulants the patient is on:->UNKNOWN   PROTHROMBIN TIME    Narrative:     Indicate which anticoagulants the patient is on:->UNKNOWN   APTT    Narrative:     Indicate which anticoagulants the patient is on:->UNKNOWN   ESTIMATED GFR    Narrative:     Indicate which anticoagulants the patient " is on:->UNKNOWN     All labs reviewed by me.    RADIOLOGY  CT-CTA HEAD WITH & W/O-POST PROCESS   Final Result      CT angiogram of the [place] is unable to identify source of subarachnoid hemorrhage      Slight new visualization of acute subarachnoid hemorrhage in the lateral ventricles and there is slight temporal lobe dilatation. Attention in follow-up is advised      Otherwise stable moderate acute subarachnoid hemorrhage filling the basal cisterns as above      OUTSIDE IMAGES-CT HEAD   Final Result      CT-CTA NECK WITH & W/O-POST PROCESSING    (Results Pending)     The radiologist’s interpretation of all radiology studies have been reviewed by me.    COURSE & MEDICAL DECISION MAKING  Pertinent Labs & Imaging studies reviewed. (See chart for details)    Reviewed patient's transfer report from [facility]. Neurologic findings show subarachnoid hemorrhage involving the region of the mxlcjy-vx-Yhugju and pituitary region. Subarachnoid blood tracking along the tentorium inferiorly and superiorly. Subarachnoid blood tracker anterior to the brainstem and cervical cord. Here is no evidence for acute ischemic changes. Head CT impression shows subarachnoid hemorrhage appear to originate in the region of the Bqozbm-rm-Rkrvar as discussed above. Consider MRI for further evaluation of possible aneurysmal bleed. NO evidence for acute infarct. Sinus disease. EKG showed normal sinus rhythm at 65.     8:25 PM Patient seen and examined at bedside. Patient presents as a transfer from [facility] for evaluation of subarachnoid hemorrhage. Ordered for labs and imaging to evaluate. Patient will be treated with Cardene 25 mg for his symptoms. Discussed plan with patient including admission for further evaluation and treatment. Patient was given an opportunity to ask questions. He understands and agrees to plan.     8:39 PM - Paged Neuro Surgery.     8:40 PM - I discussed the patient's case and the above findings  with Dr. Roberto (Neuro Surgery) who recommends admission and further plan of care.     8:43 PM - Paged [facility].    8:49 PM -  I discussed the patient's case and the above findings with [facility].     8:50 PM - Paged Hospitalist.         CRITICAL CARE  The very real possibility of a deterioration of this patient's condition required the highest level of my preparedness for sudden, emergent intervention.  I provided critical care services, which included medication orders, frequent reevaluations of the patient's condition and response to treatment, ordering and reviewing test results, and discussing the case with various consultants.  The critical care time associated with the care of the patient was 35 minutes. Review chart for interventions. This time is exclusive of any other billable procedures.      Medical Decision Making:  Patient presents with headache with known subarachnoid hemorrhage from the outside hospital.  He arrives hypertensive, given concern for possible arterial etiology, the patient was started on a nicardipine drip.  This was titrated up by nursing with limited effect.  The case was discussed with neurosurgery, as well as the intensivist.  We will plan for angiography in the morning.  Patient underwent CTA, however no obvious source identified.  Patient neurologically intact throughout his emergency department stay.  He will be hospitalized in the ICU in critical condition.  Patient's blood pressure target not reached during his initial stay the emergency department despite maximum nicardipine, will be given labetalol push.    DISPOSITION:  Patient will be hospitalized by Dr. Fajardo in critical condition.     FINAL IMPRESSION  1. Subarachnoid hemorrhage (HCC)    2. Hypertension, unspecified type            I, Elmira De La Cruz (Duyen), am scribing for, and in the presence of, Atul Tapia M.D..    Electronically signed by: Elmira De La Cruz (Duyen), 4/16/2020    IAtul M.D. personally  performed the services described in this documentation, as scribed by Elmira De La Cruz in my presence, and it is both accurate and complete. C    The note accurately reflects work and decisions made by me.  Atul Tapia M.D.  4/16/2020  10:13 PM

## 2020-04-17 NOTE — ASSESSMENT & PLAN NOTE
A1c was 6.3.   - Continue SSI and hypoglycemia protocol  - Consider lifestyle modification counseling prior to discharge

## 2020-04-17 NOTE — ASSESSMENT & PLAN NOTE
- Patient has a history of hypertension, which is being managed with lifestyle modifications.  He is is not on any antihypertensive medications at home.  - BP at the time of presentation was at 188/135 mmHg.    Plan:  - Continue nicardipine drip  - PRN labetalol, hydralazine and enalaprilat for SBP >150 mmHg  - Nimodipine 60 mg every 4 hours  - Check vitals every 2 hours

## 2020-04-17 NOTE — ASSESSMENT & PLAN NOTE
The patient's LDL is elevated at 143 and total cholesterol is 209.   - Start statin when clinically appropriate

## 2020-04-17 NOTE — ASSESSMENT & PLAN NOTE
The patient drinks alcohol daily (usually hard liquor, 2 to 3 glasses) and uses marijuana occasionally.   No evidence of withdrawal this admission.   - cessation counseling and education provided

## 2020-04-17 NOTE — DISCHARGE PLANNING
Medical Social Work     ARSH received a call from security advising ARSH that the pt wife is up in the front and would like an update on the pt. ARSH met with the pt wife Nestor and updated her that the pt is doing well at this time but they are still running test as the pt just arrived to our ER.     Nestor advised Nestor that the pt stated he would call her and update her. Nestor provided ARSH with the pt phone. ARSH gave the pt his phone so he can call his wife.     ARSH provide Nestor with SW contact information in case she had any needs.     NOK contact:     Nestor (wife) [phone]    ARSH called the pt wife Nestor and advised her of the pt room number and unit phone number.     Plan: ARSH will remain available for pt and family support.

## 2020-04-17 NOTE — ASSESSMENT & PLAN NOTE
Remains uncontrolled  Optimize oral antihypertensive regimen  Continue amlodipine 10mg this am  Nicardipine PRN  Hydralazine and labetalol prn  Coreg 12.5 BID  Hydralazine 25 q8  Increase Lisinopril 40mg daily

## 2020-04-17 NOTE — ASSESSMENT & PLAN NOTE
The patient initially presented with a BP of 188/135. This may be contributing to SAH.   Continued hypertension requiring nicardipine, titration of oral antihypertensives.  Renal artery duplex was negative for renal artery stenosis.Aldosterone and plasma renin Wnl.  Patient also has anxiety, and suffers from insomnia.  This could potentially be contribute to his high blood pressure. Wife reports that he had been in a heated argument on the phone directly prior to onset of symptoms.  He does not want to take any narcotics, sedatives.  He also has a history of 2-3 drinks, hard liquor every day.   Blood pressure systolics between 130-1 50.     - On hydralazine to 25 mg 3 times daily, amlodipine 10, Coreg 25 twice daily, lisinopril 40 mg daily  - Titrate oral antihypertensives: increase carvedilol dose, if insufficient increase hydralazine dose  - PRN antihypertensives ordered: Vasotec, labetalol, hydralazine  - Goal SBP<160 per neurosurgery  - Close blood pressure monitoring

## 2020-04-17 NOTE — PROGRESS NOTES
Critical Care Progress Note    Date of admission  4/16/2020    Chief Complaint  60 y.o. male admitted 4/16/2020 with spontaneous SAH    Hospital Course    Mr. Rae is a pleasant 60 year old male with the past medical history of hypertension who developed sudden onset of neck stiffness and posterior severe HA with nausea and vomiting around 5 pm on 4/16.  He was seen at [facility] where a non-contrast CT head revealed a SAH.  He was transferred to [facility], [place] and underwent CTA head/neck which did not show an aneurysm or AVM.  He was started on nicardipine drip for SBP goals <140.      Interval Problem Update  Reviewed last 24 hour events:   - no events overnight   - a/ox4   - neuro checks every 2 hours   - moving all, no deficits   - Ha at 4/10   - SR 60-80s   - -170s   - nicardipine drip at 15   - afebrile   - O2 at 2 lpm   - passed bedside swallow   - c/o slight nausea   - last BM pta   - UOP of 500cc with urinal   - NS at 80cc/hr   - no RT issues   - Mg at 1.7   - BG at 150-209   - CT angio this afternoon    Review of Systems  Review of Systems   Constitutional: Negative for chills, fever and malaise/fatigue.   HENT: Negative for congestion, ear pain and sinus pain.    Eyes: Negative for blurred vision, double vision and photophobia.   Respiratory: Negative for cough and shortness of breath.    Cardiovascular: Negative for chest pain and leg swelling.   Gastrointestinal: Positive for nausea and vomiting. Negative for diarrhea.   Genitourinary: Negative for frequency, hematuria and urgency.   Musculoskeletal: Positive for neck pain. Negative for back pain.   Skin: Negative for rash.   Neurological: Positive for headaches. Negative for sensory change, speech change, focal weakness, seizures and weakness.   Endo/Heme/Allergies: Does not bruise/bleed easily.   Psychiatric/Behavioral: Negative for depression. The patient is not nervous/anxious.         Vital Signs for last 24 hours   Temp:  [36.4 °C (97.5  °F)-37.1 °C (98.8 °F)] 37.1 °C (98.8 °F)  Pulse:  [68-93] 80  Resp:  [9-55] 9  BP: (123-213)/() 134/63  SpO2:  [92 %-97 %] 94 %    Hemodynamic parameters for last 24 hours       Respiratory Information for the last 24 hours       Physical Exam   Physical Exam  Vitals signs and nursing note reviewed.   Constitutional:       General: He is not in acute distress.     Appearance: Normal appearance. He is normal weight. He is ill-appearing. He is not toxic-appearing.      Comments: Mild distress related to headache   HENT:      Head: Normocephalic and atraumatic.      Right Ear: External ear normal.      Left Ear: External ear normal.      Nose: Nose normal. No rhinorrhea.      Mouth/Throat:      Mouth: Mucous membranes are moist.      Pharynx: Oropharynx is clear. No oropharyngeal exudate.   Eyes:      General: No scleral icterus.     Extraocular Movements: Extraocular movements intact.      Conjunctiva/sclera: Conjunctivae normal.      Pupils: Pupils are equal, round, and reactive to light.   Neck:      Musculoskeletal: Normal range of motion and neck supple.   Cardiovascular:      Rate and Rhythm: Normal rate and regular rhythm.      Pulses: Normal pulses.      Heart sounds: Normal heart sounds. No murmur.   Pulmonary:      Effort: Pulmonary effort is normal. No respiratory distress.      Breath sounds: Normal breath sounds. No wheezing.   Chest:      Chest wall: No tenderness.   Abdominal:      General: Abdomen is flat. Bowel sounds are normal. There is no distension.      Palpations: Abdomen is soft.      Tenderness: There is no abdominal tenderness. There is no guarding or rebound.   Musculoskeletal: Normal range of motion.      Right lower leg: No edema.      Left lower leg: No edema.   Lymphadenopathy:      Cervical: No cervical adenopathy.   Skin:     General: Skin is warm and dry.      Capillary Refill: Capillary refill takes less than 2 seconds.      Findings: No rash.   Neurological:      General: No  focal deficit present.      Mental Status: He is alert and oriented to person, place, and time.      Cranial Nerves: No cranial nerve deficit.      Sensory: No sensory deficit.      Motor: No weakness.   Psychiatric:         Mood and Affect: Mood normal.         Behavior: Behavior normal.         Thought Content: Thought content normal.         Judgment: Judgment normal.         Medications  Current Facility-Administered Medications   Medication Dose Route Frequency Provider Last Rate Last Dose   • HYDROmorphone pf (DILAUDID) injection 0.5-1 mg  0.5-1 mg Intravenous Q3HRS PRN Rudy Adams M.D.       • enalaprilat (VASOTEC) injection 1.25 mg  1.25 mg Intravenous Q6HRS PRN Rudy Adams M.D.       • calcium carbonate (TUMS) chewable tab 1,000 mg  1,000 mg Oral Q6HRS PRN Rudy Adams M.D.   1,000 mg at 04/17/20 0252   • niCARdipine (CARDENE) 25 mg in  mL Infusion  0-15 mg/hr Intravenous Continuous Atul Tapia M.D. 125 mL/hr at 04/17/20 0717 12.5 mg/hr at 04/17/20 0717   • senna-docusate (PERICOLACE or SENOKOT S) 8.6-50 MG per tablet 2 Tab  2 Tab Oral BID Brandon Fajardo M.D.        And   • polyethylene glycol/lytes (MIRALAX) PACKET 1 Packet  1 Packet Oral QDAY PRN Brandon Fajardo M.D.        And   • magnesium hydroxide (MILK OF MAGNESIA) suspension 30 mL  30 mL Oral QDAY PRN Brandon Fajardo M.D.        And   • bisacodyl (DULCOLAX) suppository 10 mg  10 mg Rectal QDAY PRN Brandon Fajardo M.D.       • acetaminophen (TYLENOL) tablet 650 mg  650 mg Oral Q6HRS PRN Brandon Fajardo M.D.   650 mg at 04/16/20 2225   • Respiratory Therapy Consult   Nebulization Continuous RT Brandon Fajardo M.D.       • NS infusion   Intravenous Continuous Brandon Fajardo M.D. 80 mL/hr at 04/17/20 0030     • ondansetron (ZOFRAN ODT) dispertab 4 mg  4 mg Oral Q4HRS PRN Brandon Fajardo M.D.        Or   • ondansetron (ZOFRAN) syringe/vial injection 4 mg  4 mg Intravenous Q4HRS PRN Brandon Fajardo M.D.   4 mg at 04/17/20 0719   •  niMODipine (NIMOTOP) capsule 60 mg  60 mg Oral Q4HRS Brandon Fajardo M.D.   60 mg at 04/17/20 0604   • MD Alert...ICU Electrolyte Replacement per Pharmacy   Other PHARMACY TO DOSE Brandon Fajardo M.D.       • hydrALAZINE (APRESOLINE) injection 10 mg  10 mg Intravenous Q4HRS PRN Rudy Adams M.D.   10 mg at 04/17/20 0056   • labetalol (NORMODYNE/TRANDATE) injection 10 mg  10 mg Intravenous Q4HRS PRN Rudy Adams M.D.   10 mg at 04/16/20 2227   • insulin lispro (HUMALOG) injection 1-6 Units  1-6 Units Subcutaneous Q6HRS Brandon Fajardo M.D.   1 Units at 04/17/20 0605    And   • glucose 4 g chewable tablet 16 g  16 g Oral Q15 MIN PRN Brandon Fajardo M.D.        And   • dextrose 50% (D50W) injection 50 mL  50 mL Intravenous Q15 MIN PRN Brandon Fajardo M.D.           Fluids    Intake/Output Summary (Last 24 hours) at 4/17/2020 0723  Last data filed at 4/17/2020 0400  Gross per 24 hour   Intake 562.08 ml   Output 450 ml   Net 112.08 ml       Laboratory          Recent Labs     04/16/20 2027 04/17/20  0255   SODIUM 139 139   POTASSIUM 4.0 4.0   CHLORIDE 101 103   CO2 24 22   BUN 22 20   CREATININE 0.77 0.78   MAGNESIUM  --  1.7   CALCIUM 9.3 8.6     Recent Labs     04/16/20 2027 04/17/20  0255   ALTSGPT 32 25   ASTSGOT 21 13   ALKPHOSPHAT 108* 99   TBILIRUBIN 0.5 0.4   GLUCOSE 150* 209*     Recent Labs     04/16/20 2027 04/17/20  0255   WBC 9.9  --    NEUTSPOLYS 87.30*  --    LYMPHOCYTES 7.80*  --    MONOCYTES 4.10  --    EOSINOPHILS 0.10  --    BASOPHILS 0.30  --    ASTSGOT 21 13   ALTSGPT 32 25   ALKPHOSPHAT 108* 99   TBILIRUBIN 0.5 0.4     Recent Labs     04/16/20 2027 04/17/20  0255   RBC 5.37  --    HEMOGLOBIN 14.4  --    HEMATOCRIT 44.4  --    PLATELETCT 271  --    PROTHROMBTM 12.4 13.3   APTT 26.8 28.2   INR 0.91 0.99       Imaging  CTA neck:  IMPRESSION:     Mild carotid and vertebral artery atherosclerosis without significant stenosis, aneurysm or occlusion     Mild bronchial wall thickening and  groundglass could indicate edema. Reactive airways disease/infection is not excluded    CTA head:  IMPRESSION:     CT angiogram of the [place] is unable to identify source of subarachnoid hemorrhage     Slight new visualization of acute subarachnoid hemorrhage in the lateral ventricles and there is slight temporal lobe dilatation. Attention in follow-up is advised     Otherwise stable moderate acute subarachnoid hemorrhage filling the basal cisterns as above    Assessment/Plan  * Subarachnoid hemorrhage (HCC)- (present on admission)  Assessment & Plan  With no clear aneurysm demonstrated on CTA head/neck on 4/16  CT angio today with IR today  SBP goals < 140  Euglycemia  Pain control  Continue Nimotop      Hypertensive emergency- (present on admission)  Assessment & Plan  Ongoing and uncontrolled  Nicardipine drip   I am actively titrating nicardipine drip to maintain SBP<140  Hydralazine and labetalol prn    Alcohol abuse, daily use  Assessment & Plan  Monitor for alcohol withdrawal   Replete electrolytes as needed       VTE:  Contraindicated  Ulcer: Not Indicated  Lines: None    I have performed a physical exam and reviewed and updated ROS and Plan today (4/17/2020). In review of yesterday's note (4/16/2020), there are no changes except as documented above.     Discussed patient condition and risk of morbidity and/or mortality with RN, RT, Pharmacy, [facility] resident, Charge nurse / hot rounds, Patient and neurology and neurosurgery  The patient remains critically ill.  Critical care time = 38 minutes in directly providing and coordinating critical care and extensive data review.  No time overlap and excludes procedures.

## 2020-04-17 NOTE — PROGRESS NOTES
Patient transported to IR with RN and transport team. Patient's vitals stable on monitor. Neuro intact, c/o HA 4/10 and nausea. Cardene infusing at 15mg/hr.  Updated wife on time of procedure. Wife requesting MD to call before procedure.

## 2020-04-17 NOTE — THERAPY
"Speech Language Therapy Evaluation completed to address cognition  Functional Status:  Patient seen this date for cognitive-linguistic evaluation. Patient awake, alert, and oriented in all spheres. Patient pleasant and cooperative during evaluation. Patient was administered the Cognistat in combination with other non-standard assessments. Patient performed WNL across all domains tested. Patient educated on TBI, cognition, and SLP recs and verbalized understanding of all education. Patient does not appear to require any further acute SLP services for cognition. Please reconsult SLP if there is a change in status or any deficts arise. RN aware.     Recommendations:  Patient performed WNL across all domains tested. Patient does not appear to require any further acute SLP services for cognition. Please reconsult SLP if there is a change in status or any deficts arise.   Plan of Care: Patient with no further skilled SLP needs in the acute care setting at this time  Post-Acute Therapy: SLP evaluation completed.  Patient is currently not being actively followed for therapy services at this time, however may be seen if requested by attending provider for 1 more visit within 30 days to address any discharge needs or if the patient has a change in status.    See \"Rehab Therapy-Acute\" Patient Summary Report for complete documentation.   "

## 2020-04-17 NOTE — ED TRIAGE NOTES
Chief Complaint   Patient presents with   • Headache     Posterior HA started at 1730 today with posterior neck pain   • Neck Pain     with associated nausea     Vitals:    04/16/20 2026   BP: (!) 188/135   Pulse: 73   Resp: 18   Temp: 36.4 °C (97.5 °F)   SpO2: 97%     Patient BIB EMS as a transfer from [facility] for a SAH that was found today at 1800 by CT scan. The patient also had associated neck pain that started around C6-7 and traveled superiorly to the back of his head. No neuro changes since this episode started. The patient has a history of HTN that has not required medication. Today the patient has had a SBP in the low 200's. The patient was given 1mg Ativan, 4mg Morphine, 4mg Zofran at 1930.    The patient is currently on the monitor, ERP at bedside, labs drawn, and medications ordered.

## 2020-04-17 NOTE — OR SURGEON
Immediate Post- Operative Note        PostOp Diagnosis: SAH.       Procedure(s): Cerebral angiogram.      Estimated Blood Loss: Less than 5 ml        Complications: None            4/17/2020     4:35 PM     Jeremiah Spencer M.D.

## 2020-04-17 NOTE — PROGRESS NOTES
[facility] ICU Progress Note      Admit Date: 4/16/2020    Resident(s): Nyasia Chiang M.D.   Attending:  JOHN HOFFMAN/ Dr. Mallory    Patient ID:    Name:  J Luis Rae     YOB: 1960  Age:  60 y.o.  male   MRN:  [id number]    Hospital Course (carried forward and updated):  J Luis Rae is a 60 y.o. male who was admitted on 4/16/2020 as a transfer from [facility] where he was diagnosed with a subarachnoid hemorrhage. The patient presented to the hospital with neck stiffness, posterior headache, and nausea/vomiting. Non-contrast CT scan at [facility] showed a SAH. Subsequent CTA did not demonstrate evidence of an aneurysm. The patient is not on blood thinners, but is taking aspirin that was started 2 weeks. Ago.     Consultants:  Critical Care  Neurosurgery     Interval Events:  - No acute events overnight  - The patient was started on a nicardipine drip and on Nimodipine  - Plan for angio today by neurosurgery.   - Continuing Q1h neurochecks. The patient's neuro exam still does not show any acute deficits.   - This AM stated that headaches, nausea, and vomiting have improved.     Review of Systems   Constitutional: Negative for chills and fever.   HENT: Negative for congestion and sore throat.    Eyes: Negative for blurred vision and double vision.   Respiratory: Negative for cough and shortness of breath.    Cardiovascular: Negative for chest pain and palpitations.   Gastrointestinal: Negative for nausea and vomiting.   Genitourinary: Negative for dysuria and urgency.   Musculoskeletal: Negative for myalgias and neck pain.   Skin: Negative for itching and rash.   Neurological: Negative for dizziness, sensory change, speech change, focal weakness, seizures, loss of consciousness, weakness and headaches.       PHYSICAL EXAM:  Vitals:    04/17/20 0900 04/17/20 0930 04/17/20 0945 04/17/20 1000   BP: 143/63 156/84 128/62 133/66   Pulse: 80 84 71 67   Resp: 19 12 (!) 28 19   Temp:       TempSrc:   "     SpO2: 93% 96% 93% 93%   Weight:       Height:        Body mass index is 31.93 kg/m².  Latest Vitals:  /66   Pulse 67   Temp 36.9 °C (98.4 °F) (Temporal)   Resp 19   Ht 1.93 m (6' 4\")   Wt 119 kg (262 lb 5.6 oz)   SpO2 93%   BMI 31.93 kg/m²   O2 therapy: Pulse Oximetry: 93 %, O2 (LPM): 2, O2 Delivery Device: Silicone Nasal Cannula  Vitals Range last 24h:  Temp:  [36.4 °C (97.5 °F)-37.1 °C (98.8 °F)] 36.9 °C (98.4 °F)  Pulse:  [67-93] 67  Resp:  [9-55] 19  BP: (123-213)/() 133/66  SpO2:  [89 %-97 %] 93 %  Date 04/17/20 0700 - 04/18/20 0659   Shift 7847-4966 6603-8602 9592-2983 24 Hour Total   INTAKE   I.V. 407.9   407.9     Cardene Volume 247.9   247.9     Volume (mL) (NS infusion) 160   160   Shift Total 407.9   407.9   OUTPUT   Urine 400   400     Urine Void (mL) 400   400   Shift Total 400   400   NET 7.9   7.9        Intake/Output Summary (Last 24 hours) at 4/17/2020 1011  Last data filed at 4/17/2020 1000  Gross per 24 hour   Intake 2333.33 ml   Output 850 ml   Net 1483.33 ml        Physical Exam   Constitutional: He is oriented to person, place, and time and well-developed, well-nourished, and in no distress. No distress.   HENT:   Head: Normocephalic and atraumatic.   Mouth/Throat: No oropharyngeal exudate.   Eyes: Pupils are equal, round, and reactive to light. Conjunctivae and EOM are normal.   Neck: Normal range of motion. No JVD present.   Cardiovascular: Normal rate and regular rhythm. Exam reveals no gallop and no friction rub.   No murmur heard.  Pulmonary/Chest: Effort normal and breath sounds normal. No respiratory distress. He has no wheezes. He has no rales.   Abdominal: Soft. Bowel sounds are normal. He exhibits no distension. There is no abdominal tenderness. There is no rebound.   Musculoskeletal:         General: No tenderness or edema.   Neurological: He is alert and oriented to person, place, and time. He has normal sensation, normal strength and intact cranial nerves. " He is not agitated and not disoriented. He displays no tremor and normal speech. No cranial nerve deficit or sensory deficit. He exhibits normal muscle tone. Coordination normal. GCS score is 15.   Skin: Skin is warm and dry. He is not diaphoretic.   Nursing note and vitals reviewed.          Recent Labs     04/16/20 2027 04/17/20  0255   SODIUM 139 139   POTASSIUM 4.0 4.0   CHLORIDE 101 103   CO2 24 22   BUN 22 20   CREATININE 0.77 0.78   MAGNESIUM  --  1.7   CALCIUM 9.3 8.6     Recent Labs     04/16/20 2027 04/17/20  0255   ALTSGPT 32 25   ASTSGOT 21 13   ALKPHOSPHAT 108* 99   TBILIRUBIN 0.5 0.4   GLUCOSE 150* 209*     Recent Labs     04/16/20 2027 04/17/20  0255   RBC 5.37 4.96   HEMOGLOBIN 14.4 13.3*   HEMATOCRIT 44.4 40.3*   PLATELETCT 271 252   PROTHROMBTM 12.4 13.3   APTT 26.8 28.2   INR 0.91 0.99     Recent Labs     04/16/20 2027 04/17/20  0255   WBC 9.9 9.7   NEUTSPOLYS 87.30* 88.10*   LYMPHOCYTES 7.80* 6.00*   MONOCYTES 4.10 5.40   EOSINOPHILS 0.10 0.00   BASOPHILS 0.30 0.20   ASTSGOT 21 13   ALTSGPT 32 25   ALKPHOSPHAT 108* 99   TBILIRUBIN 0.5 0.4       Meds:  • HYDROmorphone  0.5-1 mg     • calcium carbonate  1,000 mg     • hydrALAZINE  10 mg     • labetalol  10 mg     • enalaprilat  1.25 mg     • niCARdipine infusion  0-15 mg/hr 15 mg/hr (04/17/20 0905)   • senna-docusate  2 Tab      And   • polyethylene glycol/lytes  1 Packet      And   • magnesium hydroxide  30 mL      And   • bisacodyl  10 mg     • acetaminophen  650 mg     • Respiratory Therapy Consult       • NS   80 mL/hr at 04/17/20 0030   • ondansetron  4 mg      Or   • ondansetron  4 mg     • niMODipine  60 mg     • MD Alert...Adult ICU Electrolyte Replacement per Pharmacy       • insulin lispro  1-6 Units      And   • glucose  16 g      And   • dextrose 50%  50 mL          Procedures:  Angio by neurosurgery 4/17    Imaging:  CT-CTA NECK WITH & W/O-POST PROCESSING   Final Result      Mild carotid and vertebral artery atherosclerosis without  significant stenosis, aneurysm or occlusion      Mild bronchial wall thickening and groundglass could indicate edema. Reactive airways disease/infection is not excluded      CT-CTA HEAD WITH & W/O-POST PROCESS   Final Result      CT angiogram of the [place] is unable to identify source of subarachnoid hemorrhage      Slight new visualization of acute subarachnoid hemorrhage in the lateral ventricles and there is slight temporal lobe dilatation. Attention in follow-up is advised      Otherwise stable moderate acute subarachnoid hemorrhage filling the basal cisterns as above      OUTSIDE IMAGES-CT HEAD   Final Result      IR-CAROTID-CEREBRAL BILATERAL    (Results Pending)       Problem and Plan:  * Subarachnoid hemorrhage (HCC)- (present on admission)  Assessment & Plan  The patient presented on 4/16/2020 with neck stiffness, headaches, and nausea/vomiting. Non-con CT showed a SAH. Subsequent CTA did not show an aneurysm (although the patient does have a family history of aneurysms). He does have a history of hypertension, but is not on any anti-hypertensive medications at home. He is on aspirin but no other anti-coagulation.    Plan:  - Angio by neurosurgery planned for today.   - Continuing to hold aspirin   - Continue frequent neuro-checks  - SBP <160 as per neurosurgery, PRN medication available.   - Continuing nicardipine drip for now  - Continue Nimodipine  - Elevate head of bed.    Hypertensive emergency- (present on admission)  Assessment & Plan  The patient initially presented with a BP of 188/135. This may be contributing to SAH.     Plan:  - Strict blood pressure monitoring  - PRN hydralazine, labetalol, and vasotec to keep SBP<160 (per neurosurgery).     Alcohol abuse, daily use  Assessment & Plan  The patient drinks alcohol daily (usually hard liquor) and uses marijuana occasionally.     Plan:  - Continue to monitor for withdrawal.   - Currently not on CIWA protocol    Pre-diabetes  Assessment &  Plan  A1c was 6.3.     Plan:  - Continue SSI and hypoglycemia protocol to monitor BS.   - Consider lifestyle modification counseling prior to discharge.     Hyperlipidemia  Assessment & Plan  The patient's LDL is elevated at 143 and total cholesterol is 209.     Plan:  - Can start statin when appropriate.      DISPO: Remain in ICU for frequent neurochecks    CODE STATUS: FULL    Quality Measures:  Feeding: Passed bedside swallow.   Analgesia: None  Sedation: None  Thromboprophylaxis: Contraindicated  Head of bed: >30 degrees  Ulcer prophylaxis: None  Glycemic control: SSI  Bowel care: bowel regimen  Indwelling lines: PIV  Deescalation of antibiotics: None      Nyasia Chiang M.D.

## 2020-04-17 NOTE — CONSULTS
Critical Care Consultation    Date of consult: 2020    Referring Physician  Atul Tapia MD    Reason for Consultation  Headache, subarachnoid hemorrhage    History of Presenting Illness  60 y.o. male who presented 2020 in transfer from [facility] with acute onset headache found to have subarachnoid hemorrhage.  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  He has a history of hypertension which she is managed predominantly with diet, exercise and weight loss.  He lost about 50 pounds over the past several years with intentional diet and weight loss.  Today he had a stressful phone call and then shortly thereafter suddenly developed neck stiffness and a posterior headache that radiated forward to his forehead and was associated with nausea and vomiting at around 5 PM.  Blood pressure was elevated at just over 200 systolic on arrival.  CT head showed subarachnoid hemorrhage involving the region of the [place] of [place] and pituitary region with subarachnoid blood tracking along the tentorium inferiorly and superiorly and anterior to the brainstem and cervical cord.  There was no evidence of an aneurysm on the CT.  He was transferred to [facility] for further care.  Neurosurgery was consulted.  Blood pressure is currently controlled with nicardipine infusion to maintain systolic blood pressure less than 160.  Headache has improved and plan for angiogram in the morning.  He started taking 81 mg aspirin about 2 weeks ago.  A platelet mapping TEG was normal with no significant AA inhibition.    He does have a family history of cerebral aneurysm and notes that his father  of a brain aneurysm at age 63.    Code Status  Full Code    Review of Systems  Review of Systems   Constitutional: Negative for chills and fever.   Eyes: Negative for blurred vision.   Respiratory: Negative for stridor.    Cardiovascular:  Negative for chest pain, palpitations and orthopnea.   Gastrointestinal: Negative for abdominal pain, diarrhea, nausea and vomiting.   Genitourinary: Negative for dysuria and urgency.   Musculoskeletal: Negative for myalgias.   Skin: Negative for rash.   Neurological: Positive for headaches. Negative for weakness.       Past Medical History   has a past medical history of Hypertension.    Surgical History   has no past surgical history on file.    Family History  family history includes No Known Problems in his mother; Stroke (age of onset: 63) in his father.    Social History   He is a lifetime non-smoker.  Drinks alcohol nightly and uses marijuana occasionally.    Medications  Home Medications     Reviewed by Aurelia Hammond (Pharmacy Tech) on 04/16/20 at 2154  Med List Status: Complete   Medication Last Dose Status   aspirin EC (ECOTRIN) 81 MG Tablet Delayed Response 4/16/2020 Active   ibuprofen (MOTRIN) 200 MG Tab 4/16/2020 Active              Current Facility-Administered Medications   Medication Dose Route Frequency Provider Last Rate Last Dose   • niCARdipine (CARDENE) 25 mg in  mL Infusion  0-15 mg/hr Intravenous Continuous Atul Tapia M.D. 150 mL/hr at 04/16/20 2310 15 mg/hr at 04/16/20 2310   • senna-docusate (PERICOLACE or SENOKOT S) 8.6-50 MG per tablet 2 Tab  2 Tab Oral BID Brandon Fajardo M.D.        And   • polyethylene glycol/lytes (MIRALAX) PACKET 1 Packet  1 Packet Oral QDAY PRN Brandon Fajardo M.D.        And   • magnesium hydroxide (MILK OF MAGNESIA) suspension 30 mL  30 mL Oral QDAY PRN Brandon Fajardo M.D.        And   • bisacodyl (DULCOLAX) suppository 10 mg  10 mg Rectal QDAY PRN Brandon Fajardo M.D.       • acetaminophen (TYLENOL) tablet 650 mg  650 mg Oral Q6HRS PRN Brandon Fajardo M.D.   650 mg at 04/16/20 2225   • Respiratory Therapy Consult   Nebulization Continuous RT Brandon Fajardo M.D.       • NS infusion   Intravenous Continuous Brandon Fajardo M.D. 80 mL/hr at  04/16/20 2220     • ondansetron (ZOFRAN ODT) dispertab 4 mg  4 mg Oral Q4HRS PRN Brandon Fajardo M.D.        Or   • ondansetron (ZOFRAN) syringe/vial injection 4 mg  4 mg Intravenous Q4HRS PRN Brandon Fajardo M.D.   4 mg at 04/16/20 2225   • niMODipine (NIMOTOP) capsule 60 mg  60 mg Oral Q4HRS Brandon Fajardo M.D.   60 mg at 04/16/20 2313   • MD Alert...ICU Electrolyte Replacement per Pharmacy   Other PHARMACY TO DOSE Brandon Fajardo M.D.       • hydrALAZINE (APRESOLINE) injection 10 mg  10 mg Intravenous Q4HRS PRN Brandon Fajardo M.D.       • enalaprilat (VASOTEC) injection 1.25 mg  1.25 mg Intravenous Q6HRS PRN Brandon Fajardo M.D.       • labetalol (NORMODYNE/TRANDATE) injection 10 mg  10 mg Intravenous Q4HRS PRN Brandon Fajardo M.D.   10 mg at 04/16/20 2227   • insulin lispro (HUMALOG) injection 1-6 Units  1-6 Units Subcutaneous Q6HRS Brandon Fajardo M.D.        And   • glucose 4 g chewable tablet 16 g  16 g Oral Q15 MIN PRN Brandon Fajardo M.D.        And   • dextrose 50% (D50W) injection 50 mL  50 mL Intravenous Q15 MIN PRN Brandon Fajardo M.D.           Allergies  No Known Allergies    Vital Signs last 24 hours  Temp:  [36.4 °C (97.5 °F)] 36.4 °C (97.5 °F)  Pulse:  [68-82] 68  Resp:  [12-34] 25  BP: (153-213)/() 153/67  SpO2:  [92 %-97 %] 94 %    Physical Exam  Physical Exam  Vitals signs and nursing note reviewed.   HENT:      Head: Normocephalic and atraumatic.      Mouth/Throat:      Mouth: Mucous membranes are moist.   Eyes:      Pupils: Pupils are equal, round, and reactive to light.   Neck:      Musculoskeletal: Normal range of motion and neck supple.   Cardiovascular:      Rate and Rhythm: Normal rate and regular rhythm.      Pulses: Normal pulses.   Pulmonary:      Effort: Pulmonary effort is normal. No respiratory distress.      Breath sounds: No wheezing.   Abdominal:      General: There is no distension.      Palpations: Abdomen is soft.      Tenderness: There is no abdominal tenderness.    Musculoskeletal:         General: No swelling or deformity.   Skin:     General: Skin is warm and dry.      Capillary Refill: Capillary refill takes less than 2 seconds.   Neurological:      General: No focal deficit present.      Mental Status: He is alert and oriented to person, place, and time.      Cranial Nerves: No cranial nerve deficit.         Fluids  No intake or output data in the 24 hours ending 04/17/20 0002    Laboratory  Recent Results (from the past 48 hour(s))   CBC WITH DIFFERENTIAL    Collection Time: 04/16/20  8:27 PM   Result Value Ref Range    WBC 9.9 4.8 - 10.8 K/uL    RBC 5.37 4.70 - 6.10 M/uL    Hemoglobin 14.4 14.0 - 18.0 g/dL    Hematocrit 44.4 42.0 - 52.0 %    MCV 82.7 81.4 - 97.8 fL    MCH 26.8 (L) 27.0 - 33.0 pg    MCHC 32.4 (L) 33.7 - 35.3 g/dL    RDW 42.3 35.9 - 50.0 fL    Platelet Count 271 164 - 446 K/uL    MPV 9.5 9.0 - 12.9 fL    Neutrophils-Polys 87.30 (H) 44.00 - 72.00 %    Lymphocytes 7.80 (L) 22.00 - 41.00 %    Monocytes 4.10 0.00 - 13.40 %    Eosinophils 0.10 0.00 - 6.90 %    Basophils 0.30 0.00 - 1.80 %    Immature Granulocytes 0.40 0.00 - 0.90 %    Nucleated RBC 0.00 /100 WBC    Neutrophils (Absolute) 8.63 (H) 1.82 - 7.42 K/uL    Lymphs (Absolute) 0.77 (L) 1.00 - 4.80 K/uL    Monos (Absolute) 0.41 0.00 - 0.85 K/uL    Eos (Absolute) 0.01 0.00 - 0.51 K/uL    Baso (Absolute) 0.03 0.00 - 0.12 K/uL    Immature Granulocytes (abs) 0.04 0.00 - 0.11 K/uL    NRBC (Absolute) 0.00 K/uL   COMP METABOLIC PANEL    Collection Time: 04/16/20  8:27 PM   Result Value Ref Range    Sodium 139 135 - 145 mmol/L    Potassium 4.0 3.6 - 5.5 mmol/L    Chloride 101 96 - 112 mmol/L    Co2 24 20 - 33 mmol/L    Anion Gap 14.0 7.0 - 16.0    Glucose 150 (H) 65 - 99 mg/dL    Bun 22 8 - 22 mg/dL    Creatinine 0.77 0.50 - 1.40 mg/dL    Calcium 9.3 8.5 - 10.5 mg/dL    AST(SGOT) 21 12 - 45 U/L    ALT(SGPT) 32 2 - 50 U/L    Alkaline Phosphatase 108 (H) 30 - 99 U/L    Total Bilirubin 0.5 0.1 - 1.5 mg/dL     Albumin 4.4 3.2 - 4.9 g/dL    Total Protein 7.7 6.0 - 8.2 g/dL    Globulin 3.3 1.9 - 3.5 g/dL    A-G Ratio 1.3 g/dL   PROTHROMBIN TIME (INR)    Collection Time: 04/16/20  8:27 PM   Result Value Ref Range    PT 12.4 12.0 - 14.6 sec    INR 0.91 0.87 - 1.13   APTT    Collection Time: 04/16/20  8:27 PM   Result Value Ref Range    APTT 26.8 24.7 - 36.0 sec   ESTIMATED GFR    Collection Time: 04/16/20  8:27 PM   Result Value Ref Range    GFR If African American >60 >60 mL/min/1.73 m 2    GFR If Non African American >60 >60 mL/min/1.73 m 2       Imaging  CT-CTA NECK WITH & W/O-POST PROCESSING   Final Result      Mild carotid and vertebral artery atherosclerosis without significant stenosis, aneurysm or occlusion      Mild bronchial wall thickening and groundglass could indicate edema. Reactive airways disease/infection is not excluded      CT-CTA HEAD WITH & W/O-POST PROCESS   Final Result      CT angiogram of the [place] is unable to identify source of subarachnoid hemorrhage      Slight new visualization of acute subarachnoid hemorrhage in the lateral ventricles and there is slight temporal lobe dilatation. Attention in follow-up is advised      Otherwise stable moderate acute subarachnoid hemorrhage filling the basal cisterns as above      OUTSIDE IMAGES-CT HEAD   Final Result          Assessment/Plan  * Subarachnoid hemorrhage (HCC)- (present on admission)  Overview  -Patient presented with neck stiffness, headache and vomiting  - Noncontrast CT of the head at the outside hospital showed signs of subarachnoid hemorrhage  - No focal neurological deficits or seizure-like activity at the time of presentation   - History of hypertension being treated with lifestyle modification, is NOT on any antihypertensive medications at home.  - Blood pressure at the time of presentation at 188/135 mmHg  - Not on any anticoagulants at home, except for aspirin 81 mg that was started 2 weeks ago.  - CTA of the head and neck  showed no signs of aneurysm    Assessment & Plan  With no clear aneurysm demonstrated on CTA  Continue strict blood pressure control, SBP less than 150  Nicardipine infusion and PRN antihypertensives  Frequent neuro checks  Elevated head of bed  No coagulopathy  Nimodipine   Neurosurgery consulting, plan for cerebral angiogram in a.m., n.p.o. after midnight  Low-dose narcotics as needed for headache management    Hypertensive emergency- (present on admission)  Assessment & Plan  Strict blood pressure control  Will be monitored very closely in the intensive care unit as a risk for further vital organ deterioration.  Frequent neuro checks in place.  Daily alcohol use by history, monitor for withdrawal.  Neurosurgery consulted and will plan for angiogram in a.m.    Discussed patient condition and risk of morbidity and/or mortality with RN, Pharmacy and [facility] resident.    The patient remains critically ill.  Critical care time = 35 minutes in directly providing and coordinating critical care and extensive data review.  No time overlap and excludes procedures.

## 2020-04-17 NOTE — PROGRESS NOTES
Spoke with Dr Coon.  Neither he nor I note source on CTA. Plan for angio in am. SBP <160 over night, nimodipine. No EVD now as he is in good neurological condition.  NPO for angio tomorrow AM please  Full consult to follow

## 2020-04-17 NOTE — ASSESSMENT & PLAN NOTE
Monitor for alcohol withdrawal   Replete electrolytes as needed, ERP  Vitamin supplementation as clinically appropriate

## 2020-04-17 NOTE — ASSESSMENT & PLAN NOTE
Presented on 2020 with neck stiffness, headaches, and nausea/vomiting; wife reports that he had been in a heated argument on the phone directly prior to onset of symptoms. Non-con CT showed a SAH. Subsequent CTA did not show an aneurysm or source of SAH (although the patient does have a family history of aneurysms). He does have a history of hypertension, but was not on any anti-hypertensive medications prior to admission. He is on aspirin but no other anti-coagulation.  Cerebral Angiography: negative for aneurysm  C/b cerebral salt wasting syndrome.  [employer] gradin   PT recommended outpatient physical therapy    - Elevate head of bed  - fall/seizure/aspiration precautions  - Holding aspirin   - SBP <160 per neurosurgery  - Stable from neurosurgery point  - Repeat CTA in 6 weeks, patient will follow in [place] with [facility]

## 2020-04-17 NOTE — PROGRESS NOTES
IR Nursing Note:    Procedure Confirmed with MD, patient and RN pre procedure. Consent obtained by MD with all questions answered, placed in Patient's chart. Patient assisted to the table in IR 3 in the supine position with all bony prominences padded and draped in sterile fashion.    Cerebral Angiogram with Possible Intervention by MD Spencer assisted by RT Mina, right groin access site sealed with a TERUMO Angio-Seal Vascular Closure Device and CDI with gauze and a Tegaderm dressing.      TERUMO Angio-Seal Vascular Closure Device 6F  REF# [id number] LOT# [id number]  EXP. 08/31/2020    End Tidal CO2 range 25-30 during procedure.     Patient tolerated procedure, hemodynamically stable; pt drowsy but easily aroused post procedure; report given to ELISABET Dill; patient transported to R1 via IR RN monitored then transferred care to report RN.

## 2020-04-17 NOTE — CONSULTS
DATE OF SERVICE:  2020    REQUESTING PHYSICIAN:  Dr. Tapia.      REASON FOR CONSULTATION:  Subarachnoid hemorrhage.      HISTORY OF PRESENT ILLNESS:  This is a 60-year-old male who started having   severe headache on 2020, he stated that it was 8/10 in intensity.    Although, his headache did start getting better, it was unusual for him and he   did go to the emergency department where he had a head CT, which showed   perimesencephalic subarachnoid hemorrhage with moderate amount of subarachnoid   hemorrhage.  He was transferred to [facility] for further evaluation and care.    He had a CTA of his head and neck.  There were no vascular abnormalities noted   on the CTA.  Currently, he states that his headache is quite a bit better,   has minimal neck stiffness and is otherwise doing well.  He started using   aspirin 81 mg 2 weeks ago, but otherwise quite healthy.      PAST MEDICAL HISTORY:  None.      PAST SURGICAL HISTORY:  None.      SOCIAL HISTORY:  Positive for alcohol and occasional marijuana use.      FAMILY HISTORY:  One first-degree relative, father  of a cerebral aneurysm   at age 63.      HOME MEDICATIONS:  Aspirin, ibuprofen.      ALLERGIES:  No known drug allergies.      PHYSICAL EXAMINATION:    VITAL SIGNS:  Afebrile.  Vital signs stable.    GENERAL:  No acute distress, lying comfortably in bed.    HEENT:  Normocephalic, atraumatic.  Pupils equal and reactive to light.    NECK:  Supple, soft, and nontender.    CARDIOVASCULAR:  Regular rate and rhythm.  No clicks, rubs, gallops, or   murmurs.    RESPIRATORY:  Clear to auscultation bilaterally.  No respiratory distress.    Good air movement.  No wheezing.    ABDOMEN:  Soft, nondistended, nontender with normoactive bowel sounds.    EXTREMITIES:  2+ peripheral pulses.  No clubbing, cyanosis or edema with good   active range of motion in all 4 extremities.    SKIN:  Warm and dry.    NEUROLOGIC:  GCS 15.  Cranial nerves II-XII intact bilaterally  with no upper   extremity drift, no focal motor or sensory deficits.    PSYCHIATRIC:  Appropriate mood, affect, and judgment.      LABORATORY DATA:  Reviewed in Epic.      IMAGING:  Reviewed in Epic perimesencephalic, moderate volume subarachnoid   hemorrhage noted on CT scan.  CTA is negative for vascular abnormalities.      ASSESSMENT:  A 60-year-old male post bleed day #1, spontaneous subarachnoid   hemorrhage, which does have a perimesencephalic distribution.  CTA was   negative.      PLAN:    1.  Angiogram this morning with Dr. Aviles.    2.  Keep systolic blood pressure less than 160.    3.  Further recommendations after the results of the angiogram.  If his   angiogram is negative, we can likely discharge him on 04/18/2020 as long as he   is doing okay clinically and there are no other medical issues that require   inpatient hospitalization.    4.  If he does have an aneurysm, hopefully can be treated endovascularly and   obviously he will need be placed on aneurysmal subarachnoid hemorrhage   protocol and continue on that protocol for the coming days inpatient.      I spent a total of 85 minutes on history, physical examination, review of   electronic medical records and imaging.       ____________________________________     Colt Randolph MD SA / DIANE    DD:  04/17/2020 06:53:03  DT:  04/17/2020 11:36:37    D#:  [id number]  Job#:  [id number]

## 2020-04-17 NOTE — ASSESSMENT & PLAN NOTE
With no clear aneurysm demonstrated on CTA head/neck on 4/16  CT angio 4/17 without AVM or aneurysm, transferred to neurosurgery floor 4/18  Return to ICU 4/20 sodium and BP control per neurosurgery  Neurochecks every 4 hours, monitor for the need for more frequent  Monitor for the need for follow-up imaging studies, confer with neurosurgery daily  SBP goals <160  Goal sodium 135-145  Euglycemia  Pain control without oversedation  Nimotop stopped and patient transition to ACE inhibitor and beta-blocker while on neurosurgical floor as no indication for nimodipine at present as SAH not felt to be aneurysmal

## 2020-04-18 PROBLEM — R06.89 ACUTE RESPIRATORY INSUFFICIENCY: Status: ACTIVE | Noted: 2020-04-18

## 2020-04-18 PROBLEM — E87.6 HYPOKALEMIA: Status: ACTIVE | Noted: 2020-04-18

## 2020-04-18 PROBLEM — E83.42 HYPOMAGNESEMIA: Status: ACTIVE | Noted: 2020-04-18

## 2020-04-18 LAB
ANION GAP SERPL CALC-SCNC: 12 MMOL/L (ref 7–16)
BUN SERPL-MCNC: 18 MG/DL (ref 8–22)
CALCIUM SERPL-MCNC: 7.8 MG/DL (ref 8.5–10.5)
CHLORIDE SERPL-SCNC: 106 MMOL/L (ref 96–112)
CO2 SERPL-SCNC: 19 MMOL/L (ref 20–33)
CREAT SERPL-MCNC: 0.8 MG/DL (ref 0.5–1.4)
GLUCOSE BLD-MCNC: 124 MG/DL (ref 65–99)
GLUCOSE BLD-MCNC: 133 MG/DL (ref 65–99)
GLUCOSE BLD-MCNC: 153 MG/DL (ref 65–99)
GLUCOSE SERPL-MCNC: 154 MG/DL (ref 65–99)
MAGNESIUM SERPL-MCNC: 1.7 MG/DL (ref 1.5–2.5)
POTASSIUM SERPL-SCNC: 3.4 MMOL/L (ref 3.6–5.5)
SODIUM SERPL-SCNC: 137 MMOL/L (ref 135–145)

## 2020-04-18 PROCEDURE — 700102 HCHG RX REV CODE 250 W/ 637 OVERRIDE(OP): Performed by: INTERNAL MEDICINE

## 2020-04-18 PROCEDURE — 97161 PT EVAL LOW COMPLEX 20 MIN: CPT

## 2020-04-18 PROCEDURE — A9270 NON-COVERED ITEM OR SERVICE: HCPCS | Performed by: STUDENT IN AN ORGANIZED HEALTH CARE EDUCATION/TRAINING PROGRAM

## 2020-04-18 PROCEDURE — 700102 HCHG RX REV CODE 250 W/ 637 OVERRIDE(OP): Performed by: STUDENT IN AN ORGANIZED HEALTH CARE EDUCATION/TRAINING PROGRAM

## 2020-04-18 PROCEDURE — A9270 NON-COVERED ITEM OR SERVICE: HCPCS | Performed by: INTERNAL MEDICINE

## 2020-04-18 PROCEDURE — 770020 HCHG ROOM/CARE - TELE (206)

## 2020-04-18 PROCEDURE — 700101 HCHG RX REV CODE 250: Performed by: INTERNAL MEDICINE

## 2020-04-18 PROCEDURE — 99291 CRITICAL CARE FIRST HOUR: CPT | Performed by: INTERNAL MEDICINE

## 2020-04-18 PROCEDURE — 700105 HCHG RX REV CODE 258: Performed by: INTERNAL MEDICINE

## 2020-04-18 PROCEDURE — 700111 HCHG RX REV CODE 636 W/ 250 OVERRIDE (IP): Performed by: STUDENT IN AN ORGANIZED HEALTH CARE EDUCATION/TRAINING PROGRAM

## 2020-04-18 PROCEDURE — 82962 GLUCOSE BLOOD TEST: CPT

## 2020-04-18 PROCEDURE — 80048 BASIC METABOLIC PNL TOTAL CA: CPT

## 2020-04-18 PROCEDURE — 700105 HCHG RX REV CODE 258: Performed by: STUDENT IN AN ORGANIZED HEALTH CARE EDUCATION/TRAINING PROGRAM

## 2020-04-18 PROCEDURE — 83735 ASSAY OF MAGNESIUM: CPT

## 2020-04-18 PROCEDURE — 700111 HCHG RX REV CODE 636 W/ 250 OVERRIDE (IP): Performed by: INTERNAL MEDICINE

## 2020-04-18 RX ORDER — FUROSEMIDE 10 MG/ML
20 INJECTION INTRAMUSCULAR; INTRAVENOUS ONCE
Status: COMPLETED | OUTPATIENT
Start: 2020-04-18 | End: 2020-04-18

## 2020-04-18 RX ORDER — MAGNESIUM SULFATE HEPTAHYDRATE 40 MG/ML
2 INJECTION, SOLUTION INTRAVENOUS ONCE
Status: COMPLETED | OUTPATIENT
Start: 2020-04-18 | End: 2020-04-18

## 2020-04-18 RX ORDER — POTASSIUM CHLORIDE 20 MEQ/1
40 TABLET, EXTENDED RELEASE ORAL 2 TIMES DAILY
Status: COMPLETED | OUTPATIENT
Start: 2020-04-18 | End: 2020-04-18

## 2020-04-18 RX ORDER — LABETALOL HYDROCHLORIDE 5 MG/ML
10-20 INJECTION, SOLUTION INTRAVENOUS
Status: DISCONTINUED | OUTPATIENT
Start: 2020-04-18 | End: 2020-04-19

## 2020-04-18 RX ORDER — ENALAPRILAT 1.25 MG/ML
1.25 INJECTION INTRAVENOUS EVERY 6 HOURS PRN
Status: DISCONTINUED | OUTPATIENT
Start: 2020-04-18 | End: 2020-04-19

## 2020-04-18 RX ORDER — HYDRALAZINE HYDROCHLORIDE 25 MG/1
25 TABLET, FILM COATED ORAL EVERY 8 HOURS
Status: DISCONTINUED | OUTPATIENT
Start: 2020-04-18 | End: 2020-04-19

## 2020-04-18 RX ORDER — FLUTICASONE PROPIONATE 50 MCG
2 SPRAY, SUSPENSION (ML) NASAL DAILY
Status: DISCONTINUED | OUTPATIENT
Start: 2020-04-18 | End: 2020-04-22

## 2020-04-18 RX ORDER — LORAZEPAM 1 MG/1
0.5 TABLET ORAL ONCE
Status: COMPLETED | OUTPATIENT
Start: 2020-04-18 | End: 2020-04-18

## 2020-04-18 RX ORDER — AMLODIPINE BESYLATE 5 MG/1
5 TABLET ORAL ONCE
Status: COMPLETED | OUTPATIENT
Start: 2020-04-18 | End: 2020-04-18

## 2020-04-18 RX ORDER — HYDRALAZINE HYDROCHLORIDE 20 MG/ML
10 INJECTION INTRAMUSCULAR; INTRAVENOUS
Status: DISCONTINUED | OUTPATIENT
Start: 2020-04-18 | End: 2020-04-19

## 2020-04-18 RX ORDER — AMLODIPINE BESYLATE 5 MG/1
5 TABLET ORAL
Status: DISCONTINUED | OUTPATIENT
Start: 2020-04-18 | End: 2020-04-18

## 2020-04-18 RX ORDER — AMLODIPINE BESYLATE 10 MG/1
10 TABLET ORAL
Status: DISCONTINUED | OUTPATIENT
Start: 2020-04-19 | End: 2020-04-19

## 2020-04-18 RX ADMIN — OMEPRAZOLE 20 MG: 20 CAPSULE, DELAYED RELEASE ORAL at 17:51

## 2020-04-18 RX ADMIN — ENALAPRILAT 1.25 MG: 1.25 INJECTION INTRAVENOUS at 18:31

## 2020-04-18 RX ADMIN — ACETAMINOPHEN 650 MG: 325 TABLET, FILM COATED ORAL at 14:45

## 2020-04-18 RX ADMIN — AMLODIPINE BESYLATE 5 MG: 5 TABLET ORAL at 08:21

## 2020-04-18 RX ADMIN — ENALAPRILAT 1.25 MG: 1.25 INJECTION INTRAVENOUS at 01:57

## 2020-04-18 RX ADMIN — ONDANSETRON 4 MG: 2 INJECTION INTRAMUSCULAR; INTRAVENOUS at 09:31

## 2020-04-18 RX ADMIN — POTASSIUM CHLORIDE 40 MEQ: 1500 TABLET, EXTENDED RELEASE ORAL at 17:51

## 2020-04-18 RX ADMIN — NIMODIPINE 60 MG: 30 CAPSULE ORAL at 01:57

## 2020-04-18 RX ADMIN — POTASSIUM CHLORIDE 40 MEQ: 1500 TABLET, EXTENDED RELEASE ORAL at 08:21

## 2020-04-18 RX ADMIN — FUROSEMIDE 20 MG: 10 INJECTION, SOLUTION INTRAMUSCULAR; INTRAVENOUS at 10:19

## 2020-04-18 RX ADMIN — ACETAMINOPHEN 650 MG: 325 TABLET, FILM COATED ORAL at 21:51

## 2020-04-18 RX ADMIN — NIMODIPINE 60 MG: 30 CAPSULE ORAL at 21:46

## 2020-04-18 RX ADMIN — HYDRALAZINE HYDROCHLORIDE 10 MG: 20 INJECTION INTRAMUSCULAR; INTRAVENOUS at 19:52

## 2020-04-18 RX ADMIN — FLUTICASONE PROPIONATE 100 MCG: 50 SPRAY, METERED NASAL at 10:19

## 2020-04-18 RX ADMIN — HYDRALAZINE HYDROCHLORIDE 25 MG: 25 TABLET, FILM COATED ORAL at 13:42

## 2020-04-18 RX ADMIN — SODIUM CHLORIDE 15 MG/HR: 9 INJECTION, SOLUTION INTRAVENOUS at 05:16

## 2020-04-18 RX ADMIN — AMLODIPINE BESYLATE 5 MG: 5 TABLET ORAL at 17:50

## 2020-04-18 RX ADMIN — LORAZEPAM 0.5 MG: 1 TABLET ORAL at 08:21

## 2020-04-18 RX ADMIN — SENNOSIDES AND DOCUSATE SODIUM 2 TABLET: 8.6; 5 TABLET ORAL at 17:51

## 2020-04-18 RX ADMIN — ANTACID TABLETS 1000 MG: 500 TABLET, CHEWABLE ORAL at 15:09

## 2020-04-18 RX ADMIN — ONDANSETRON 4 MG: 2 INJECTION INTRAMUSCULAR; INTRAVENOUS at 14:43

## 2020-04-18 RX ADMIN — HYDRALAZINE HYDROCHLORIDE 25 MG: 25 TABLET, FILM COATED ORAL at 09:31

## 2020-04-18 RX ADMIN — LABETALOL HYDROCHLORIDE 10 MG: 5 INJECTION, SOLUTION INTRAVENOUS at 00:35

## 2020-04-18 RX ADMIN — ACETAMINOPHEN 650 MG: 325 TABLET, FILM COATED ORAL at 02:49

## 2020-04-18 RX ADMIN — SODIUM CHLORIDE: 9 INJECTION, SOLUTION INTRAVENOUS at 05:16

## 2020-04-18 RX ADMIN — MAGNESIUM SULFATE 2 G: 2 INJECTION INTRAVENOUS at 06:46

## 2020-04-18 RX ADMIN — SODIUM CHLORIDE 15 MG/HR: 9 INJECTION, SOLUTION INTRAVENOUS at 02:00

## 2020-04-18 RX ADMIN — SODIUM CHLORIDE 10 MG/HR: 9 INJECTION, SOLUTION INTRAVENOUS at 09:14

## 2020-04-18 RX ADMIN — OMEPRAZOLE 20 MG: 20 CAPSULE, DELAYED RELEASE ORAL at 05:13

## 2020-04-18 RX ADMIN — HYDRALAZINE HYDROCHLORIDE 25 MG: 25 TABLET, FILM COATED ORAL at 21:49

## 2020-04-18 RX ADMIN — ONDANSETRON 4 MG: 2 INJECTION INTRAMUSCULAR; INTRAVENOUS at 02:49

## 2020-04-18 ASSESSMENT — ENCOUNTER SYMPTOMS
FOCAL WEAKNESS: 0
DIZZINESS: 0
NAUSEA: 0
SEIZURES: 0
NAUSEA: 1
WEAKNESS: 0
PHOTOPHOBIA: 0
NECK PAIN: 1
SENSORY CHANGE: 0
NECK PAIN: 0
VOMITING: 0
PALPITATIONS: 0
NERVOUS/ANXIOUS: 0
FEVER: 0
CHILLS: 0
SORE THROAT: 0
LOSS OF CONSCIOUSNESS: 0
BACK PAIN: 0
VOMITING: 1
BRUISES/BLEEDS EASILY: 0
MYALGIAS: 0
SHORTNESS OF BREATH: 0
DOUBLE VISION: 0
HEADACHES: 0
SPEECH CHANGE: 0
DIARRHEA: 0
DEPRESSION: 0
COUGH: 0
SINUS PAIN: 0
BLURRED VISION: 0
HEADACHES: 1

## 2020-04-18 ASSESSMENT — GAIT ASSESSMENTS
GAIT LEVEL OF ASSIST: SUPERVISED
DISTANCE (FEET): 125

## 2020-04-18 ASSESSMENT — COGNITIVE AND FUNCTIONAL STATUS - GENERAL
MOBILITY SCORE: 22
CLIMB 3 TO 5 STEPS WITH RAILING: A LITTLE
SUGGESTED CMS G CODE MODIFIER MOBILITY: CJ
WALKING IN HOSPITAL ROOM: A LITTLE

## 2020-04-18 NOTE — PROGRESS NOTES
[facility] ICU Transfer Note                                                                                         ICU Admit Date:  4/16/2020      ICU Discharge Date:  04/18/20      Primary Diagnosis:   Subarachnoid hemorrhage (HCC)       ICU Course Summary (Brief Narrative):  J Luis Rae is a 60 y.o. with history of hypertension (diagnosed, but the patient was not on any treatment), who presented as a transfer from [facility] for a subarachnoid hemorrhage.     SAH: The patient was seen by neurosurgery who decided to manage this conservatively. The CTAs did not show any aneurysm. The patient underwent a cerebral angiogram with IR, who did not find an aneurysm either. His SAH may be related to hypertensive emergency. The patient's neuro exam is stable with no focal neurological deficits. For now we are treating this by maintaining SBP<160 and holding all anti-coagulation. The patient is on Aspirin at home that he started 2 weeks prior to admission. This has been held too. Neurosurgery is following and as for now would like to watch the patient for 1 more day with Q4h neurochecks.     Hypertensive emergency: The patient presented with hypertensive emergency. As per the patient he has a history of hypertension at home, but was never on any treatment for it. The patient was treated with a nicardipine drip. However, now he was started on Norvasc and HCTZ. Please ensure blood pressures remain stable. He was diuresed for fluid overload (he was receiving large amounts of fluid on admission) and this has helped with the BP. Goal is SBP<160. He also has PRNs ordered. His Amlodipine and HCTZ may need to be adjusted prior to discharge.        Important Events in the ICU with relevant dates:  - Central Line: None   - Intubation: None   - Pressors: None   - Cardona catheter: None  - Tube feeding: None  - Antibiotics: None   - Other procedures: Cerebral Angio 4/17/2020.     Things to follow:  - Please ensure he is on appropriate  home HTN medications   - Please keep SBP <160.   - Neurosurgery is following.     Nyasia Chiang M.D.

## 2020-04-18 NOTE — PROGRESS NOTES
Neurosurgery Progress Note    Subjective:  Patient presents with SAH with severe headache  Speaking with patient he has HTN and was in a verbal confrontation over the phone when his symptoms presented.     Patient is improving  More mild headache now  Notes feeling ill from anesthesia which is not new for him    No aneurysm seen during IR procedure.   No new symptoms overnight  Still requiring IV cardiac meds to keep HTN under control     Exam:  GCS 15  No drift, grossly strong  Appropriate   EOM intact, pupils equal     BP  Min: 105/55  Max: 156/69  Pulse  Av.1  Min: 60  Max: 88  Resp  Av.4  Min: 8  Max: 150  Temp  Av °C (98.6 °F)  Min: 36.7 °C (98.1 °F)  Max: 37.4 °C (99.4 °F)  SpO2  Av.5 %  Min: 89 %  Max: 97 %    No data recorded    Recent Labs     20   WBC 9.9 9.7   RBC 5.37 4.96   HEMOGLOBIN 14.4 13.3*   HEMATOCRIT 44.4 40.3*   MCV 82.7 81.3*   MCH 26.8* 26.8*   MCHC 32.4* 33.0*   RDW 42.3 40.7   PLATELETCT 271 252   MPV 9.5 9.3     Recent Labs     20  0437   SODIUM 139 139 137   POTASSIUM 4.0 4.0 3.4*   CHLORIDE 101 103 106   CO2 24 22 19*   GLUCOSE 150* 209* 154*   BUN 22 20 18   CREATININE 0.77 0.78 0.80   CALCIUM 9.3 8.6 7.8*     Recent Labs     20  025   APTT 26.8 28.2   INR 0.91 0.99     Recent Labs     20  0310   REACTMIN 5.4 5.4   CLOTKINET 2.4 2.1   CLOTANGL 59.0 62.2   MAXCLOTS 63.3 65.0   [id number] 0.0 0.0   PRCINADP 1.3  --    PRCINAA 22.0  --        Intake/Output       20 0700 - 20 0659 20 0700 - 20 0659      5840-9740 9778-8612 Total 3784-3090 3094-0720 Total       Intake    P.O.  240  900 1140  --  -- --    P.O.  -- -- --    I.V.  2807.9  2760 5567.9  --  -- --    Magnesium Sulfate Volume 100 -- 100 -- -- --    Cardene Volume 1747.9 1800 3547.9 -- -- --    Volume (mL) (NS infusion)  -- -- --    Total Intake 3047.9 3660 6707.9  -- -- --       Output    Urine  1425  225 1650  --  -- --    Number of Times Voided -- 2 x 2 x -- -- --    Urine Void (mL) 7208 712 1148 -- -- --    Total Output 1685 108 3390 -- -- --       Net I/O     1622.9 3435 5057.9 -- -- --            Intake/Output Summary (Last 24 hours) at 4/18/2020 0758  Last data filed at 4/18/2020 0600  Gross per 24 hour   Intake 6707.92 ml   Output 1650 ml   Net 5057.92 ml            • magnesium sulfate  2 g Once   • potassium chloride SA  40 mEq BID   • amLODIPine  5 mg Q DAY   • enalaprilat  1.25 mg Q6HRS PRN   • labetalol  10-20 mg Q2HRS PRN   • hydrALAZINE  25 mg Q8HRS   • LORazepam  0.5 mg Once   • HYDROmorphone  0.5-1 mg Q3HRS PRN   • calcium carbonate  1,000 mg Q6HRS PRN   • hydrALAZINE  10 mg Q2HRS PRN   • niCARdipine infusion  0-15 mg/hr Continuous   • omeprazole  20 mg BID   • hyoscyamine-maalox plus-lidocaine viscous  30 mL Once PRN   • senna-docusate  2 Tab BID    And   • polyethylene glycol/lytes  1 Packet QDAY PRN    And   • magnesium hydroxide  30 mL QDAY PRN    And   • bisacodyl  10 mg QDAY PRN   • acetaminophen  650 mg Q6HRS PRN   • Respiratory Therapy Consult   Continuous RT   • NS   Continuous   • ondansetron  4 mg Q4HRS PRN    Or   • ondansetron  4 mg Q4HRS PRN   • niMODipine  60 mg Q4HRS   • MD Alert...Adult ICU Electrolyte Replacement per Pharmacy   PHARMACY TO DOSE   • insulin lispro  1-6 Units Q6HRS    And   • glucose  16 g Q15 MIN PRN    And   • dextrose 50%  50 mL Q15 MIN PRN       Assessment and Plan:  Hospital day #3  Appreciate critical care   Can go to q4hr neuro checks  PT/OT  Needs to wean off cardiac IV medications  SBP to be <160mmHg  Hopeful for floor today, home tomorrow   Will follow     Prophylactic anticoagulation: no         Start date/time:

## 2020-04-18 NOTE — PROGRESS NOTES
Critical Care Progress Note    Date of admission  4/16/2020    Chief Complaint  60 y.o. male admitted 4/16/2020 with spontaneous SAH    Hospital Course    Mr. Rae is a pleasant 60 year old male with the past medical history of hypertension who developed sudden onset of neck stiffness and posterior severe HA with nausea and vomiting around 5 pm on 4/16.  He was seen at [facility] where a non-contrast CT head revealed a SAH.  He was transferred to [facility] and underwent CTA head/neck which did not show an aneurysm or AVM.  He was started on nicardipine drip for SBP goals <140.      Interval Problem Update  Reviewed last 24 hour events:   - no events overnight   - a/ox4   - nicardipine drip at 15-->7.5   - no neuro deficits   - slight nausea   - voiding in urinal   - SR 60-80   - -150s   - afebrile   - O2 at 4 lpm   - pt is 6.5 liters fluid positive   - no imaging   - Mg 1.7   - k 3.4    Yesterday's Events:   - no events overnight   - a/ox4   - neuro checks every 2 hours   - moving all, no deficits   - Ha at 4/10   - SR 60-80s   - -170s   - nicardipine drip at 15   - afebrile   - O2 at 2 lpm   - passed bedside swallow   - c/o slight nausea   - last BM pta   - UOP of 500cc with urinal   - NS at 80cc/hr   - no RT issues   - Mg at 1.7   - BG at 150-209   - CT angio this afternoon    Review of Systems  Review of Systems   Constitutional: Negative for chills, fever and malaise/fatigue.   HENT: Negative for congestion, ear pain and sinus pain.    Eyes: Negative for blurred vision, double vision and photophobia.   Respiratory: Negative for cough and shortness of breath.    Cardiovascular: Negative for chest pain and leg swelling.   Gastrointestinal: Positive for nausea and vomiting. Negative for diarrhea.   Genitourinary: Negative for frequency, hematuria and urgency.   Musculoskeletal: Positive for neck pain. Negative for back pain.   Skin: Negative for rash.   Neurological: Positive for headaches.  Negative for sensory change, speech change, focal weakness, seizures and weakness.   Endo/Heme/Allergies: Does not bruise/bleed easily.   Psychiatric/Behavioral: Negative for depression. The patient is not nervous/anxious.     no change to ROS    Vital Signs for last 24 hours   Temp:  [36.7 °C (98.1 °F)-37.4 °C (99.4 °F)] 37.1 °C (98.7 °F)  Pulse:  [60-88] 80  Resp:  [8-150] 124  BP: (105-156)/(52-84) 142/64  SpO2:  [89 %-97 %] 91 %    Hemodynamic parameters for last 24 hours       Respiratory Information for the last 24 hours       Physical Exam   Physical Exam  Vitals signs and nursing note reviewed.   Constitutional:       General: He is not in acute distress.     Appearance: Normal appearance. He is obese. He is not ill-appearing or toxic-appearing.   HENT:      Head: Normocephalic and atraumatic.      Right Ear: External ear normal.      Left Ear: External ear normal.      Nose: Nose normal. No rhinorrhea.      Mouth/Throat:      Mouth: Mucous membranes are moist.      Pharynx: Oropharynx is clear. No oropharyngeal exudate.   Eyes:      General: No scleral icterus.     Extraocular Movements: Extraocular movements intact.      Conjunctiva/sclera: Conjunctivae normal.      Pupils: Pupils are equal, round, and reactive to light.   Neck:      Musculoskeletal: Normal range of motion and neck supple.   Cardiovascular:      Rate and Rhythm: Normal rate and regular rhythm.      Pulses: Normal pulses.      Heart sounds: Normal heart sounds. No murmur.   Pulmonary:      Effort: Pulmonary effort is normal. No respiratory distress.      Breath sounds: Normal breath sounds. No wheezing.   Chest:      Chest wall: No tenderness.   Abdominal:      General: Abdomen is flat. Bowel sounds are normal. There is no distension.      Palpations: Abdomen is soft.      Tenderness: There is no abdominal tenderness. There is no guarding or rebound.   Musculoskeletal: Normal range of motion.         General: No swelling.      Right lower  leg: No edema.      Left lower leg: No edema.   Lymphadenopathy:      Cervical: No cervical adenopathy.   Skin:     General: Skin is warm and dry.      Capillary Refill: Capillary refill takes less than 2 seconds.      Findings: No rash.   Neurological:      General: No focal deficit present.      Mental Status: He is alert and oriented to person, place, and time.      Cranial Nerves: No cranial nerve deficit.      Sensory: No sensory deficit.      Motor: No weakness.   Psychiatric:         Mood and Affect: Mood normal.         Behavior: Behavior normal.         Thought Content: Thought content normal.         Judgment: Judgment normal.         Medications  Current Facility-Administered Medications   Medication Dose Route Frequency Provider Last Rate Last Dose   • magnesium sulfate IVPB premix 2 g  2 g Intravenous Once Nyasia Chiang M.D. 25 mL/hr at 04/18/20 0646 2 g at 04/18/20 0646   • potassium chloride SA (Kdur) tablet 40 mEq  40 mEq Oral BID Nyasia Chiang M.D.       • HYDROmorphone pf (DILAUDID) injection 0.5-1 mg  0.5-1 mg Intravenous Q3HRS PRN Rudy Adams M.D.       • calcium carbonate (TUMS) chewable tab 1,000 mg  1,000 mg Oral Q6HRS PRN Rudy Adams M.D.   1,000 mg at 04/17/20 0734   • enalaprilat (VASOTEC) injection 1.25 mg  1.25 mg Intravenous Q6HRS PRN Dedra Mallory M.D.   1.25 mg at 04/18/20 0157   • hydrALAZINE (APRESOLINE) injection 10 mg  10 mg Intravenous Q2HRS PRN Nyasia Chiang M.D.   10 mg at 04/17/20 2039   • labetalol (NORMODYNE/TRANDATE) injection 10 mg  10 mg Intravenous Q2HRS PRN Nyasia Chiang M.D.   10 mg at 04/18/20 0035   • niCARdipine (CARDENE) 50 mg in  mL Infusion  0-15 mg/hr Intravenous Continuous Dedra Mallory M.D. 150 mL/hr at 04/18/20 0516 15 mg/hr at 04/18/20 0516   • omeprazole (PRILOSEC) capsule 20 mg  20 mg Oral BID Rudy Adams M.D.   20 mg at 04/18/20 0513   • hyoscyamine-maalox plus-lidocaine viscous (GI COCKTAIL) oral susp 30 mL  30 mL Oral Once  PRN Rudy Adams M.D.       • senna-docusate (PERICOLACE or SENOKOT S) 8.6-50 MG per tablet 2 Tab  2 Tab Oral BID Brandon Fajardo M.D.   2 Tab at 04/17/20 1747    And   • polyethylene glycol/lytes (MIRALAX) PACKET 1 Packet  1 Packet Oral QDAY PRN Brandon Fajardo M.D.        And   • magnesium hydroxide (MILK OF MAGNESIA) suspension 30 mL  30 mL Oral QDAY PRN Brandon Fajardo M.D.        And   • bisacodyl (DULCOLAX) suppository 10 mg  10 mg Rectal QDAY PRN Brandon Fajardo M.D.       • acetaminophen (TYLENOL) tablet 650 mg  650 mg Oral Q6HRS PRN Brandon Fajardo M.D.   650 mg at 04/18/20 0249   • Respiratory Therapy Consult   Nebulization Continuous RT Brandon Fajardo M.D.       • NS infusion   Intravenous Continuous Brandon Fajardo M.D. 80 mL/hr at 04/18/20 0516     • ondansetron (ZOFRAN ODT) dispertab 4 mg  4 mg Oral Q4HRS PRN Brandon Fajardo M.D.        Or   • ondansetron (ZOFRAN) syringe/vial injection 4 mg  4 mg Intravenous Q4HRS PRN Brandon Fajardo M.D.   4 mg at 04/18/20 0249   • niMODipine (NIMOTOP) capsule 60 mg  60 mg Oral Q4HRS Brandon Fajardo M.D.   60 mg at 04/18/20 0157   • MD Alert...ICU Electrolyte Replacement per Pharmacy   Other PHARMACY TO DOSE Brandon Fajardo M.D.       • insulin lispro (HUMALOG) injection 1-6 Units  1-6 Units Subcutaneous Q6HRS Brandon Fajardo M.D.   Stopped at 04/17/20 1200    And   • glucose 4 g chewable tablet 16 g  16 g Oral Q15 MIN PRN Brandon Fajardo M.D.        And   • dextrose 50% (D50W) injection 50 mL  50 mL Intravenous Q15 MIN PRN Brandon Fajardo M.D.           Fluids    Intake/Output Summary (Last 24 hours) at 4/18/2020 0716  Last data filed at 4/18/2020 0600  Gross per 24 hour   Intake 6707.92 ml   Output 1650 ml   Net 5057.92 ml       Laboratory          Recent Labs     04/16/20 2027 04/17/20  0255 04/18/20  0437   SODIUM 139 139 137   POTASSIUM 4.0 4.0 3.4*   CHLORIDE 101 103 106   CO2 24 22 19*   BUN 22 20 18   CREATININE 0.77 0.78 0.80   MAGNESIUM  --  1.7 1.7    CALCIUM 9.3 8.6 7.8*     Recent Labs     04/16/20 2027 04/17/20  0255 04/18/20  0437   ALTSGPT 32 25  --    ASTSGOT 21 13  --    ALKPHOSPHAT 108* 99  --    TBILIRUBIN 0.5 0.4  --    GLUCOSE 150* 209* 154*     Recent Labs     04/16/20 2027 04/17/20  0255   WBC 9.9 9.7   NEUTSPOLYS 87.30* 88.10*   LYMPHOCYTES 7.80* 6.00*   MONOCYTES 4.10 5.40   EOSINOPHILS 0.10 0.00   BASOPHILS 0.30 0.20   ASTSGOT 21 13   ALTSGPT 32 25   ALKPHOSPHAT 108* 99   TBILIRUBIN 0.5 0.4     Recent Labs     04/16/20 2027 04/17/20  0255   RBC 5.37 4.96   HEMOGLOBIN 14.4 13.3*   HEMATOCRIT 44.4 40.3*   PLATELETCT 271 252   PROTHROMBTM 12.4 13.3   APTT 26.8 28.2   INR 0.91 0.99       Imaging  CTA neck:  IMPRESSION:     Mild carotid and vertebral artery atherosclerosis without significant stenosis, aneurysm or occlusion     Mild bronchial wall thickening and groundglass could indicate edema. Reactive airways disease/infection is not excluded    CTA head:  IMPRESSION:     CT angiogram of the [place] is unable to identify source of subarachnoid hemorrhage     Slight new visualization of acute subarachnoid hemorrhage in the lateral ventricles and there is slight temporal lobe dilatation. Attention in follow-up is advised     Otherwise stable moderate acute subarachnoid hemorrhage filling the basal cisterns as above    Assessment/Plan  * Subarachnoid hemorrhage (HCC)- (present on admission)  Assessment & Plan  With no clear aneurysm demonstrated on CTA head/neck on 4/16  CT angio 4/17 without AVM or aneurysm-->ok to go to neuro floor per neuro/nsg  SBP goals < 140  Euglycemia  Pain control  Continue Nimotop      Hypokalemia  Assessment & Plan  Replete to goal > 4    Hypomagnesemia  Assessment & Plan  Replete to goal > 2    Acute respiratory insufficiency  Assessment & Plan  Likely the result of IVF overload  Noted pt to be 6.5 liters (+)  Start force diuresis of Lasix 20mg IV x 1  Mobilize     Hypertensive emergency- (present on  admission)  Assessment & Plan  Remains uncontrolled  Nicardipine drip   I am actively titrating nicardipine drip to maintain SBP<140  Hydralazine and labetalol prn  Started oral amlodipine 5mg daily and hydralazine 25mg every 8 hours  One dose of Lasix 20mg IV    Alcohol abuse, daily use  Assessment & Plan  Monitor for alcohol withdrawal   Replete electrolytes as needed       VTE:  Contraindicated  Ulcer: Not Indicated  Lines: None    I have performed a physical exam and reviewed and updated ROS and Plan today (4/18/2020). In review of yesterday's note (4/17/2020), there are no changes except as documented above.     The patient remains critically ill with unstable blood pressures still requiring titration of nicardipine drip.  He is at high risk of clinical deterioration, worsening vital organ dysfunction, and death without the above critical care interventions.    Discussed patient condition and risk of morbidity and/or mortality with RN, RT, Pharmacy, [facility] resident, Charge nurse / hot rounds, Patient and neurology and neurosurgery  The patient remains critically ill.  Critical care time = 32 minutes in directly providing and coordinating critical care and extensive data review.  No time overlap and excludes procedures.

## 2020-04-18 NOTE — THERAPY
PT eval order received. Pt with active bedrest orders. Will defer eval until these have been discharged and pt is appropriate for out of bed tasks.

## 2020-04-18 NOTE — PROGRESS NOTES
[facility] ICU Progress Note      Admit Date: 4/16/2020    Resident(s): Nyasia Chiang M.D.   Attending:  JOHN HOFFMAN/ Dr. Mallory    Patient ID:    Name:  J Luis Rae     YOB: 1960  Age:  60 y.o.  male   MRN:  [id number]    Hospital Course (carried forward and updated):  J Luis Rae is a 60 y.o. male who was admitted on 4/16/2020 as a transfer from [facility] where he was diagnosed with a subarachnoid hemorrhage. The patient presented to the hospital with neck stiffness, posterior headache, and nausea/vomiting. Non-contrast CT scan at [facility] showed a SAH. Subsequent CTA did not demonstrate evidence of an aneurysm. The patient is not on blood thinners, but is taking aspirin that was started 2 weeks. Ago.     Consultants:  Critical Care  Neurosurgery     Interval Events:  - No acute events overnight  - Switching from Nicardipine drip to Amlodipine and Norvasc.   - Diuresing as the patient is fluid overloaded.   - As per neurosurgery, would like to monitor for 1 more day with Q4h neurochecks.   - Transfer to floor.    Review of Systems   Constitutional: Negative for chills and fever.   HENT: Negative for congestion and sore throat.    Eyes: Negative for blurred vision and double vision.   Respiratory: Negative for cough and shortness of breath.    Cardiovascular: Negative for chest pain and palpitations.   Gastrointestinal: Negative for nausea and vomiting.   Genitourinary: Negative for dysuria and urgency.   Musculoskeletal: Negative for myalgias and neck pain.   Skin: Negative for itching and rash.   Neurological: Negative for dizziness, sensory change, speech change, focal weakness, seizures, loss of consciousness, weakness and headaches.       PHYSICAL EXAM:  Vitals:    04/18/20 1015 04/18/20 1030 04/18/20 1045 04/18/20 1100   BP: 148/58 151/62 151/67 155/70   Pulse: 75 73 87 72   Resp: 18 20 20 20   Temp:       TempSrc:       SpO2: 90% 94% 91% 91%   Weight:       Height:         "Body mass index is 31.93 kg/m².  Latest Vitals:  /70   Pulse 72   Temp 37.1 °C (98.7 °F) (Temporal)   Resp 20   Ht 1.93 m (6' 4\")   Wt 119 kg (262 lb 5.6 oz)   SpO2 91%   BMI 31.93 kg/m²   O2 therapy: Pulse Oximetry: 91 %, O2 (LPM): 4, O2 Delivery Device: Silicone Nasal Cannula  Vitals Range last 24h:  Temp:  [36.7 °C (98.1 °F)-37.4 °C (99.4 °F)] 37.1 °C (98.7 °F)  Pulse:  [60-88] 72  Resp:  [8-150] 20  BP: (105-162)/(52-82) 155/70  SpO2:  [89 %-97 %] 91 %  Date 04/18/20 0700 - 04/19/20 0659   Shift 9690-3381 8294-8105 9605-4435 24 Hour Total   INTAKE   P.O. 450   450     P.O. 450   450   I.V. 785.4   785.4     Cardene Volume 472.1   472.1     Volume (mL) (NS infusion) 313.3   313.3   Shift Total 1235.4   1235.4   OUTPUT   Urine 450   450     Number of Times Voided 3 x   3 x     Urine Void (mL) 450   450   Shift Total 450   450   .4   785.4        Intake/Output Summary (Last 24 hours) at 4/18/2020 1130  Last data filed at 4/18/2020 1000  Gross per 24 hour   Intake 7075.41 ml   Output 1375 ml   Net 5700.41 ml        Physical Exam   Constitutional: He is oriented to person, place, and time and well-developed, well-nourished, and in no distress. No distress.   HENT:   Head: Normocephalic and atraumatic.   Mouth/Throat: No oropharyngeal exudate.   Eyes: Pupils are equal, round, and reactive to light. Conjunctivae and EOM are normal.   Neck: Normal range of motion. No JVD present.   Cardiovascular: Normal rate and regular rhythm. Exam reveals no gallop and no friction rub.   No murmur heard.  Pulmonary/Chest: Effort normal and breath sounds normal. No respiratory distress. He has no wheezes. He has no rales.   Abdominal: Soft. Bowel sounds are normal. He exhibits no distension. There is no abdominal tenderness. There is no rebound.   Musculoskeletal:         General: No tenderness or edema.   Neurological: He is alert and oriented to person, place, and time. He has normal sensation, normal strength " and intact cranial nerves. He is not agitated and not disoriented. He displays no tremor and normal speech. No cranial nerve deficit or sensory deficit. He exhibits normal muscle tone. Coordination normal. GCS score is 15.   Skin: Skin is warm and dry. He is not diaphoretic.   Nursing note and vitals reviewed.          Recent Labs     04/16/20 2027 04/17/20 0255 04/18/20 0437   SODIUM 139 139 137   POTASSIUM 4.0 4.0 3.4*   CHLORIDE 101 103 106   CO2 24 22 19*   BUN 22 20 18   CREATININE 0.77 0.78 0.80   MAGNESIUM  --  1.7 1.7   CALCIUM 9.3 8.6 7.8*     Recent Labs     04/16/20 2027 04/17/20 0255 04/18/20 0437   ALTSGPT 32 25  --    ASTSGOT 21 13  --    ALKPHOSPHAT 108* 99  --    TBILIRUBIN 0.5 0.4  --    GLUCOSE 150* 209* 154*     Recent Labs     04/16/20 2027 04/17/20  0255   RBC 5.37 4.96   HEMOGLOBIN 14.4 13.3*   HEMATOCRIT 44.4 40.3*   PLATELETCT 271 252   PROTHROMBTM 12.4 13.3   APTT 26.8 28.2   INR 0.91 0.99     Recent Labs     04/16/20 2027 04/17/20  0255   WBC 9.9 9.7   NEUTSPOLYS 87.30* 88.10*   LYMPHOCYTES 7.80* 6.00*   MONOCYTES 4.10 5.40   EOSINOPHILS 0.10 0.00   BASOPHILS 0.30 0.20   ASTSGOT 21 13   ALTSGPT 32 25   ALKPHOSPHAT 108* 99   TBILIRUBIN 0.5 0.4       Meds:  • potassium chloride SA  40 mEq     • amLODIPine  5 mg     • enalaprilat  1.25 mg     • labetalol  10-20 mg     • hydrALAZINE  25 mg     • hydrALAZINE  10 mg     • fluticasone  2 Spray     • HYDROmorphone  0.5-1 mg     • calcium carbonate  1,000 mg     • niCARdipine infusion  0-15 mg/hr 2.5 mg/hr (04/18/20 1105)   • omeprazole  20 mg     • hyoscyamine-maalox plus-lidocaine viscous  30 mL     • senna-docusate  2 Tab      And   • polyethylene glycol/lytes  1 Packet      And   • magnesium hydroxide  30 mL      And   • bisacodyl  10 mg     • acetaminophen  650 mg     • Respiratory Therapy Consult       • ondansetron  4 mg      Or   • ondansetron  4 mg     • niMODipine  60 mg     • MD Alert...Adult ICU Electrolyte Replacement per  Pharmacy       • insulin lispro  1-6 Units      And   • glucose  16 g      And   • dextrose 50%  50 mL          Procedures:  Angio by neurosurgery 4/17    Imaging:  IR-CAROTID-CEREBRAL BILATERAL   Final Result         1.  Normal cerebral arteriogram. No evidence of aneurysm.   2.  The origin of the left posterior inferior cerebellar artery is cut off on the lateral view and largely obscured on the AP view due to overlapping structures. This was not noticed during the procedure, only at the time of final interpretation.    Attention to this location on follow-up CTA or MRA is recommended.      CT-CTA NECK WITH & W/O-POST PROCESSING   Final Result      Mild carotid and vertebral artery atherosclerosis without significant stenosis, aneurysm or occlusion      Mild bronchial wall thickening and groundglass could indicate edema. Reactive airways disease/infection is not excluded      CT-CTA HEAD WITH & W/O-POST PROCESS   Final Result      CT angiogram of the [place] is unable to identify source of subarachnoid hemorrhage      Slight new visualization of acute subarachnoid hemorrhage in the lateral ventricles and there is slight temporal lobe dilatation. Attention in follow-up is advised      Otherwise stable moderate acute subarachnoid hemorrhage filling the basal cisterns as above      OUTSIDE IMAGES-CT HEAD   Final Result          Problem and Plan:  * Subarachnoid hemorrhage (HCC)- (present on admission)  Assessment & Plan  The patient presented on 4/16/2020 with neck stiffness, headaches, and nausea/vomiting. Non-con CT showed a SAH. Subsequent CTA did not show an aneurysm (although the patient does have a family history of aneurysms). He does have a history of hypertension, but is not on any anti-hypertensive medications at home. He is on aspirin but no other anti-coagulation.    Plan:  - Angio by neurosurgery did not show a aneurysm.    - Continuing to hold aspirin   - Change to q4h neuro-checks  - SBP <160  as per neurosurgery, PRN medication available.   - Transitioning to Norvasc and HTCZ  - Continue Nimodipine  - Elevate head of bed.    Acute respiratory insufficiency  Assessment & Plan  Patient is currently fluid overloaded.     Plan:  - Diuresing with Lasix and will continue to monitor.     Hypertensive emergency- (present on admission)  Assessment & Plan  The patient initially presented with a BP of 188/135. This may be contributing to SAH.     Plan:  - Strict blood pressure monitoring  - PRN hydralazine, labetalol, and vasotec to keep SBP<160 (per neurosurgery).     Alcohol abuse, daily use  Assessment & Plan  The patient drinks alcohol daily (usually hard liquor) and uses marijuana occasionally.     Plan:  - Continue to monitor for withdrawal.   - Currently not on CIWA protocol    Pre-diabetes  Assessment & Plan  A1c was 6.3.     Plan:  - Continue SSI and hypoglycemia protocol to monitor BS.   - Consider lifestyle modification counseling prior to discharge.     Hyperlipidemia  Assessment & Plan  The patient's LDL is elevated at 143 and total cholesterol is 209.     Plan:  - Can start statin when appropriate.      DISPO: Remain in ICU for frequent neurochecks    CODE STATUS: FULL    Quality Measures:  Feeding: Passed bedside swallow.   Analgesia: None  Sedation: None  Thromboprophylaxis: Contraindicated  Head of bed: >30 degrees  Ulcer prophylaxis: None  Glycemic control: SSI  Bowel care: bowel regimen  Indwelling lines: PIV  Deescalation of antibiotics: None      Nyasia Chiang M.D.

## 2020-04-18 NOTE — PROGRESS NOTES
Patient returned to [facility]109 after cerebral angiogram procedure. Neuro intact- A/Ox4, MAEx4, denies numbness/tingling, pupils JIN. HA 1/20 pain. Cardene continues to infuse at 15mg/hr. 6L NC O2. I spoke with patient's wife and updated her on patient's progress. R groin site noted, dressing CDI, +2 pulses.

## 2020-04-18 NOTE — CARE PLAN
Problem: Pain Management  Goal: Pain level will decrease to patient's comfort goal  Outcome: PROGRESSING AS EXPECTED  No complaints of pain at this time       Problem: Fluid Volume:  Goal: Will maintain balanced intake and output  Outcome: PROGRESSING AS EXPECTED  Adequate urine output

## 2020-04-18 NOTE — CARE PLAN
Problem: Safety  Goal: Will remain free from injury  Outcome: PROGRESSING AS EXPECTED  Bed is locked and in lowest position with treaded socks on and call light within reach. Patient educated regarding safety. Verbalizes understanding and calls appropriately for assistance.       Problem: Knowledge Deficit  Goal: Knowledge of disease process/condition, treatment plan, diagnostic tests, and medications will improve  Outcome: PROGRESSING AS EXPECTED  Pt educated on POC, current medications and doses and disease process. All questions answered.

## 2020-04-18 NOTE — PROGRESS NOTES
Assessed the patient at bedside.   60 year old male with PMH HTN,  transferred from [facility] 4/16/20 with SAH and hypertensive emergency. Patient was managed on Nicardipine drip, nimodipine and vasotec. Underwent Cerebral angiogram, which did not show any evidence of aneurysm. Was seen by Neurosurgery, who recommended conservative management. Transferred to floor, for 1 more day of monitoring,after which patient can be potentially discharged.   Physical exam was unremarkable, and neurological exam was non focal.     Plan:   -Neuro checks q4h  -BP management  -Follow up with Neurosurgery recs    Patient accepted to floor.

## 2020-04-18 NOTE — DISCHARGE SUMMARY
Discharge Summary    Date of Admission: 4/16/2020  Date of Discharge: 4/28/2020  Discharging Attending: Dr. Shawn Alfred M.D.  Discharging Senior Resident: Dr. Jolly  Discharging Intern: Dr. Araya    CHIEF COMPLAINT ON ADMISSION  Chief Complaint   Patient presents with   • Headache     Posterior HA started at 1730 today with posterior neck pain   • Neck Pain     with associated nausea       Reason for Admission  Hyponatremia    Admission Date  4/16/2020    CODE STATUS  Full Code    HPI & HOSPITAL COURSE  This is a 60 y.o. male here with a history of hypertension not on any mediation who presented with hypertensive emergency and subarachnoid hemorrhage.   He was admitted to ICU on nicardipine drip, nimodipine and Vasotec.  No source of bleed or aneurysm was identified on cerebral angiography, and patient did not require any neurosurgical intervention.  His SAH was likely a result of hypertensive emergency.   Off the Nicardipine drip, patient was found to have resistant hypertension, uncontrolled with 3-4 different classes of antihypertensives.  Renal artery duplex was negative for renal artery stenosis or abdominal aortic aneurysm.  Plasma renin and aldosterone levels were found to be low.   Patient also started developing hyponatremia, with sodium dropping from 135 to 123 in 12 hours. Patient was transferred back to the ICU in view of cerebral salt wasting requiring hypertonic saline drip.   In the ICU, patient continued to have resistant hyponatremia, with sodium finally plateauing at 129-130 on 50 cc/hour hypertonic saline, salt tablets 4 g 3 times daily with addition of fludrocortisone, after which he was transferred back to floor.  On the floor, patient's blood pressure was managed with hydralazine, coreg, lisinopril and amlodipine. Hypertonic saline drip was continued and titrated based on sodium levels which were trended every 6 hours.  His sodium reached goal level and stablized over 4 days, after which  he was managed with salt tablets.   Patient was assessed by PT and OT who recommended outpatient physical therapy.  He was provided with a front wheel walker based on his needs.  Patient has a primary care physician and cardiologist in [place], where he will follow-up after discharge.      Therefore, he is discharged in fair and stable condition to home with close outpatient follow-up.    The patient met 2-midnight criteria for an inpatient stay at the time of discharge.    Discharge Date  4/28/2020    FOLLOW UP ITEMS POST DISCHARGE  -Follow[facility]up in [facility]  -Sodium levels, goal of 135-145  -BP management, goal systolic blood pressure less than 160 mmHg  -Repeat CT Angiography in 6 weeks    DISCHARGE DIAGNOSES  Principal Problem:    Hyponatremia POA: Clinically Undetermined  Active Problems:    Non-aneurysmal perimesencephalic subarachnoid hemorrhage (HCC) POA: Yes    Hypertensive emergency POA: Yes    Alcohol abuse, daily use POA: Yes    Hypokalemia POA: No    At risk for obstructive sleep apnea POA: Yes    Hypophosphatemia POA: No    Headache POA: Yes    Hyperlipidemia POA: Yes    Pre-diabetes POA: Yes    Hypomagnesemia POA: No    Class 1 obesity due to excess calories without serious comorbidity with body mass index (BMI) of 33.0 to 33.9 in adult POA: Yes  Resolved Problems:    Acute respiratory insufficiency POA: No      FOLLOW UP  No future appointments.  No follow-up provider specified.    MEDICATIONS ON DISCHARGE     Medication List      START taking these medications      Instructions   amLODIPine 10 MG Tabs  Start taking on:  April 29, 2020  Commonly known as:  NORVASC   Take 1 Tab by mouth every day.  Dose:  10 mg     carvedilol 25 MG Tabs  Commonly known as:  COREG   Take 1 Tab by mouth 2 times a day, with meals.  Dose:  25 mg     fluticasone 50 MCG/ACT nasal spray  Commonly known as:  FLONASE   Spray 2 Sprays in nose 1 time daily as needed (allergies).  Dose:  2 Spray     hydrALAZINE  25 MG Tabs  Commonly known as:  APRESOLINE   Take 1 Tab by mouth every 8 hours.  Dose:  25 mg     lisinopril 40 MG tablet  Start taking on:  April 29, 2020  Commonly known as:  PRINIVIL   Take 1 Tab by mouth every day.  Dose:  40 mg     sodium chloride 1 GM Tabs  Commonly known as:  SALT   Take 1 Tab by mouth every day.  Dose:  1 g        CONTINUE taking these medications      Instructions   aspirin EC 81 MG Tbec  Commonly known as:  ECOTRIN   Take 1 Tab by mouth every day.  Dose:  81 mg        STOP taking these medications    ibuprofen 200 MG Tabs  Commonly known as:  MOTRIN            Allergies  No Known Allergies    DIET  Orders Placed This Encounter   Procedures   • Diet Order Regular     Standing Status:   Standing     Number of Occurrences:   1     Order Specific Question:   Diet:     Answer:   Regular [1]     Order Specific Question:   Consistency/Fluid modifications:     Answer:   1500 ml Fluid Restriction [9]     Comments:   no free water       ACTIVITY  As tolerated.  Weight bearing as tolerated    CONSULTATIONS  Dr. Randolph with Neurosurgery consulted.  Treatment options were discussed and plan of care agreed upon.    PROCEDURES  NA    Time spent on discharge: 41 minutes

## 2020-04-18 NOTE — THERAPY
"Physical Therapy Evaluation completed.   Bed Mobility:  Supine to Sit: Supervised  Transfers: Sit to Stand: Supervised  Gait: Level Of Assist: Supervised with No Equipment Needed       Plan of Care: Patient with no further skilled PT needs in the acute care setting at this time  Discharge Recommendations: Equipment: No Equipment Needed. Post-acute therapy Anticipate that the patient will have no further physical therapy needs after discharge from the hospital.    Mr. Rae is a 59 y/o male who presents to acute secondary to SAH. He presents at his baseline level of function. He was able to perform gait, transfers, and bed mobility without physical assist. LE strength equal bilaterally and WFL. No sensation deficits. No additional acute PT needs.    See \"Rehab Therapy-Acute\" Patient Summary Report for complete documentation.     "

## 2020-04-19 PROBLEM — I60.8 NON-ANEURYSMAL PERIMESENCEPHALIC SUBARACHNOID HEMORRHAGE (HCC): Status: ACTIVE | Noted: 2020-04-16

## 2020-04-19 PROBLEM — Z91.89 AT RISK FOR OBSTRUCTIVE SLEEP APNEA: Status: ACTIVE | Noted: 2020-04-19

## 2020-04-19 LAB
ANION GAP SERPL CALC-SCNC: 13 MMOL/L (ref 7–16)
BUN SERPL-MCNC: 12 MG/DL (ref 8–22)
CALCIUM SERPL-MCNC: 8.6 MG/DL (ref 8.5–10.5)
CHLORIDE SERPL-SCNC: 101 MMOL/L (ref 96–112)
CO2 SERPL-SCNC: 21 MMOL/L (ref 20–33)
CREAT SERPL-MCNC: 0.72 MG/DL (ref 0.5–1.4)
FERRITIN SERPL-MCNC: 530 NG/ML (ref 22–322)
GLUCOSE BLD-MCNC: 117 MG/DL (ref 65–99)
GLUCOSE BLD-MCNC: 122 MG/DL (ref 65–99)
GLUCOSE BLD-MCNC: 122 MG/DL (ref 65–99)
GLUCOSE BLD-MCNC: 137 MG/DL (ref 65–99)
GLUCOSE SERPL-MCNC: 138 MG/DL (ref 65–99)
POTASSIUM SERPL-SCNC: 4 MMOL/L (ref 3.6–5.5)
SODIUM SERPL-SCNC: 135 MMOL/L (ref 135–145)

## 2020-04-19 PROCEDURE — A9270 NON-COVERED ITEM OR SERVICE: HCPCS | Performed by: INTERNAL MEDICINE

## 2020-04-19 PROCEDURE — A9270 NON-COVERED ITEM OR SERVICE: HCPCS | Performed by: STUDENT IN AN ORGANIZED HEALTH CARE EDUCATION/TRAINING PROGRAM

## 2020-04-19 PROCEDURE — 82728 ASSAY OF FERRITIN: CPT

## 2020-04-19 PROCEDURE — 700111 HCHG RX REV CODE 636 W/ 250 OVERRIDE (IP): Performed by: INTERNAL MEDICINE

## 2020-04-19 PROCEDURE — 82088 ASSAY OF ALDOSTERONE: CPT

## 2020-04-19 PROCEDURE — 97535 SELF CARE MNGMENT TRAINING: CPT

## 2020-04-19 PROCEDURE — 700102 HCHG RX REV CODE 250 W/ 637 OVERRIDE(OP): Performed by: STUDENT IN AN ORGANIZED HEALTH CARE EDUCATION/TRAINING PROGRAM

## 2020-04-19 PROCEDURE — 82962 GLUCOSE BLOOD TEST: CPT | Mod: 91

## 2020-04-19 PROCEDURE — 99233 SBSQ HOSP IP/OBS HIGH 50: CPT | Mod: GC | Performed by: INTERNAL MEDICINE

## 2020-04-19 PROCEDURE — 700102 HCHG RX REV CODE 250 W/ 637 OVERRIDE(OP): Performed by: INTERNAL MEDICINE

## 2020-04-19 PROCEDURE — 80048 BASIC METABOLIC PNL TOTAL CA: CPT

## 2020-04-19 PROCEDURE — 770020 HCHG ROOM/CARE - TELE (206)

## 2020-04-19 PROCEDURE — A9270 NON-COVERED ITEM OR SERVICE: HCPCS | Performed by: PHYSICIAN ASSISTANT

## 2020-04-19 PROCEDURE — 700101 HCHG RX REV CODE 250: Performed by: INTERNAL MEDICINE

## 2020-04-19 PROCEDURE — 700102 HCHG RX REV CODE 250 W/ 637 OVERRIDE(OP): Performed by: PHYSICIAN ASSISTANT

## 2020-04-19 PROCEDURE — 84244 ASSAY OF RENIN: CPT

## 2020-04-19 RX ORDER — LABETALOL HYDROCHLORIDE 5 MG/ML
20 INJECTION, SOLUTION INTRAVENOUS EVERY 6 HOURS PRN
Status: DISCONTINUED | OUTPATIENT
Start: 2020-04-19 | End: 2020-04-20

## 2020-04-19 RX ORDER — DEXTROSE MONOHYDRATE 25 G/50ML
50 INJECTION, SOLUTION INTRAVENOUS
Status: DISCONTINUED | OUTPATIENT
Start: 2020-04-19 | End: 2020-04-20

## 2020-04-19 RX ORDER — LISINOPRIL 5 MG/1
5 TABLET ORAL
Status: DISCONTINUED | OUTPATIENT
Start: 2020-04-19 | End: 2020-04-19

## 2020-04-19 RX ORDER — TRAZODONE HYDROCHLORIDE 50 MG/1
25 TABLET ORAL ONCE
Status: DISCONTINUED | OUTPATIENT
Start: 2020-04-20 | End: 2020-04-20

## 2020-04-19 RX ORDER — LISINOPRIL 10 MG/1
10 TABLET ORAL
Status: DISCONTINUED | OUTPATIENT
Start: 2020-04-19 | End: 2020-04-20

## 2020-04-19 RX ORDER — HYDRALAZINE HYDROCHLORIDE 20 MG/ML
20 INJECTION INTRAMUSCULAR; INTRAVENOUS EVERY 6 HOURS PRN
Status: DISCONTINUED | OUTPATIENT
Start: 2020-04-19 | End: 2020-04-19

## 2020-04-19 RX ORDER — LABETALOL HYDROCHLORIDE 5 MG/ML
10 INJECTION, SOLUTION INTRAVENOUS EVERY 6 HOURS PRN
Status: DISCONTINUED | OUTPATIENT
Start: 2020-04-19 | End: 2020-04-19

## 2020-04-19 RX ORDER — ALPRAZOLAM 0.25 MG/1
0.25 TABLET ORAL 3 TIMES DAILY PRN
Status: DISCONTINUED | OUTPATIENT
Start: 2020-04-19 | End: 2020-04-20

## 2020-04-19 RX ORDER — HYDRALAZINE HYDROCHLORIDE 25 MG/1
25 TABLET, FILM COATED ORAL EVERY 8 HOURS
Status: DISCONTINUED | OUTPATIENT
Start: 2020-04-19 | End: 2020-04-22

## 2020-04-19 RX ORDER — HYDROCHLOROTHIAZIDE 25 MG/1
25 TABLET ORAL
Status: DISCONTINUED | OUTPATIENT
Start: 2020-04-19 | End: 2020-04-19

## 2020-04-19 RX ORDER — AMLODIPINE BESYLATE 5 MG/1
10 TABLET ORAL
Status: DISCONTINUED | OUTPATIENT
Start: 2020-04-20 | End: 2020-04-20

## 2020-04-19 RX ORDER — HYDROCHLOROTHIAZIDE 25 MG/1
12.5 TABLET ORAL
Status: DISCONTINUED | OUTPATIENT
Start: 2020-04-19 | End: 2020-04-20

## 2020-04-19 RX ORDER — LABETALOL HYDROCHLORIDE 5 MG/ML
10 INJECTION, SOLUTION INTRAVENOUS ONCE
Status: COMPLETED | OUTPATIENT
Start: 2020-04-19 | End: 2020-04-19

## 2020-04-19 RX ORDER — AMLODIPINE BESYLATE 10 MG/1
10 TABLET ORAL
Status: DISCONTINUED | OUTPATIENT
Start: 2020-04-19 | End: 2020-04-19

## 2020-04-19 RX ADMIN — LABETALOL HYDROCHLORIDE 10 MG: 5 INJECTION, SOLUTION INTRAVENOUS at 08:22

## 2020-04-19 RX ADMIN — FLUTICASONE PROPIONATE 100 MCG: 50 SPRAY, METERED NASAL at 05:36

## 2020-04-19 RX ADMIN — NIMODIPINE 60 MG: 30 CAPSULE ORAL at 05:36

## 2020-04-19 RX ADMIN — HYDRALAZINE HYDROCHLORIDE 25 MG: 25 TABLET, FILM COATED ORAL at 17:48

## 2020-04-19 RX ADMIN — AMLODIPINE BESYLATE 10 MG: 10 TABLET ORAL at 05:36

## 2020-04-19 RX ADMIN — ALPRAZOLAM 0.25 MG: 0.25 TABLET ORAL at 09:25

## 2020-04-19 RX ADMIN — HYDROCHLOROTHIAZIDE 25 MG: 25 TABLET ORAL at 07:41

## 2020-04-19 RX ADMIN — NIMODIPINE 60 MG: 30 CAPSULE ORAL at 02:12

## 2020-04-19 RX ADMIN — LABETALOL HYDROCHLORIDE 10 MG: 5 INJECTION INTRAVENOUS at 15:15

## 2020-04-19 RX ADMIN — ACETAMINOPHEN 650 MG: 325 TABLET, FILM COATED ORAL at 23:07

## 2020-04-19 RX ADMIN — LISINOPRIL 10 MG: 10 TABLET ORAL at 11:24

## 2020-04-19 RX ADMIN — HYDRALAZINE HYDROCHLORIDE 25 MG: 25 TABLET, FILM COATED ORAL at 20:42

## 2020-04-19 RX ADMIN — LABETALOL HYDROCHLORIDE 20 MG: 5 INJECTION, SOLUTION INTRAVENOUS at 21:50

## 2020-04-19 RX ADMIN — HYDROCHLOROTHIAZIDE 12.5 MG: 12.5 TABLET ORAL at 07:33

## 2020-04-19 RX ADMIN — ACETAMINOPHEN 650 MG: 325 TABLET, FILM COATED ORAL at 16:49

## 2020-04-19 RX ADMIN — HYDRALAZINE HYDROCHLORIDE 20 MG: 20 INJECTION INTRAMUSCULAR; INTRAVENOUS at 13:03

## 2020-04-19 RX ADMIN — ACETAMINOPHEN 650 MG: 325 TABLET, FILM COATED ORAL at 04:05

## 2020-04-19 RX ADMIN — HYDRALAZINE HYDROCHLORIDE 25 MG: 25 TABLET, FILM COATED ORAL at 05:36

## 2020-04-19 RX ADMIN — ACETAMINOPHEN 650 MG: 325 TABLET, FILM COATED ORAL at 10:36

## 2020-04-19 ASSESSMENT — ENCOUNTER SYMPTOMS
TINGLING: 0
HEMOPTYSIS: 0
SENSORY CHANGE: 0
ORTHOPNEA: 0
FEVER: 0
CONSTIPATION: 0
PHOTOPHOBIA: 0
DIZZINESS: 0
NAUSEA: 0
BACK PAIN: 0
SPUTUM PRODUCTION: 0
NECK PAIN: 0
SEIZURES: 0
WEAKNESS: 0
LOSS OF CONSCIOUSNESS: 0
CHILLS: 0
DEPRESSION: 0
DOUBLE VISION: 0
COUGH: 0
HEADACHES: 1
BRUISES/BLEEDS EASILY: 0
PALPITATIONS: 0
EYE REDNESS: 0
MYALGIAS: 0
ABDOMINAL PAIN: 0
TREMORS: 0
FLANK PAIN: 0
STRIDOR: 0
WHEEZING: 0
SINUS PAIN: 0
FOCAL WEAKNESS: 0
VOMITING: 0
DIARRHEA: 0
EYE DISCHARGE: 0
HEARTBURN: 0
SHORTNESS OF BREATH: 0
SPEECH CHANGE: 0
SORE THROAT: 0
EYE PAIN: 0
BLURRED VISION: 0

## 2020-04-19 ASSESSMENT — COGNITIVE AND FUNCTIONAL STATUS - GENERAL
DAILY ACTIVITIY SCORE: 24
SUGGESTED CMS G CODE MODIFIER DAILY ACTIVITY: CH

## 2020-04-19 ASSESSMENT — FIBROSIS 4 INDEX: FIB4 SCORE: 0.61

## 2020-04-19 ASSESSMENT — ACTIVITIES OF DAILY LIVING (ADL): TOILETING: INDEPENDENT

## 2020-04-19 NOTE — PROGRESS NOTES
Neurosurgery Progress Note    Subjective:  Patient presents with SAH with severe headache  Speaking with patient he has HTN and was in a verbal confrontation over the phone when his symptoms presented.     No aneurysm seen during IR procedure.     No new symptoms overnight  More mild headache now  Patient is very anxious today  Still hypertensive     Exam:  GCS 15  No drift, grossly strong  Appropriate   EOM intact, pupils equal   Very anxious     BP  Min: 129/55  Max: 182/80  Pulse  Av.3  Min: 65  Max: 87  Resp  Av.6  Min: 15  Max: 126  Temp  Av.1 °C (98.8 °F)  Min: 36.9 °C (98.5 °F)  Max: 37.2 °C (99 °F)  SpO2  Av.2 %  Min: 84 %  Max: 96 %    No data recorded    Recent Labs     20   WBC 9.9 9.7   RBC 5.37 4.96   HEMOGLOBIN 14.4 13.3*   HEMATOCRIT 44.4 40.3*   MCV 82.7 81.3*   MCH 26.8* 26.8*   MCHC 32.4* 33.0*   RDW 42.3 40.7   PLATELETCT 271 252   MPV 9.5 9.3     Recent Labs     20  0255 20  0437 20  0415   SODIUM 139 137 135   POTASSIUM 4.0 3.4* 4.0   CHLORIDE 103 106 101   CO2 22 19* 21   GLUCOSE 209* 154* 138*   BUN 20 18 12   CREATININE 0.78 0.80 0.72   CALCIUM 8.6 7.8* 8.6     Recent Labs     20  0255   APTT 26.8 28.2   INR 0.91 0.99     Recent Labs     20  0310   REACTMIN 5.4 5.4   CLOTKINET 2.4 2.1   CLOTANGL 59.0 62.2   MAXCLOTS 63.3 65.0   QBW27RBG 0.0 0.0   PRCINADP 1.3  --    PRCINAA 22.0  --        Intake/Output       20 0700 - 20 0659 20 07 - 20 0659      0165-7645 9482-4886 Total 1861-5030 4210-5441 Total       Intake    P.O.  1100  850 1950  100  -- 100    P.O. 3644 435 5970 100 -- 100    I.V.  884.2  -- 884.2  --  -- --    Cardene Volume 570.8 -- 570.8 -- -- --    Volume (mL) (NS infusion) 313.3 -- 313.3 -- -- --    Total Intake 1984.2 850 2834.2 100 -- 100       Output    Urine  5737 7954 4458  350  -- 350    Number of Times Voided 15 x -- 15 x 2 x -- 2 x    Urine  Void (mL) 3825 2900 6725 350 -- 350    Total Output 3825 2900 6725 350 -- 350       Net I/O     -1840.8 -2050 -3890.8 -250 -- -250            Intake/Output Summary (Last 24 hours) at 4/19/2020 0912  Last data filed at 4/19/2020 0800  Gross per 24 hour   Intake 2235.83 ml   Output 6925 ml   Net -4689.17 ml            • insulin lispro  1-6 Units 4X/DAY ACHS    And   • glucose  16 g Q15 MIN PRN    And   • dextrose 50%  50 mL Q15 MIN PRN   • [START ON 4/20/2020] amLODIPine  10 mg Q DAY   • hydroCHLOROthiazide  12.5 mg Q DAY   • labetalol  10 mg Q6HRS PRN   • ALPRAZolam  0.25 mg TID PRN   • fluticasone  2 Spray DAILY   • calcium carbonate  1,000 mg Q6HRS PRN   • hyoscyamine-maalox plus-lidocaine viscous  30 mL Once PRN   • senna-docusate  2 Tab BID    And   • polyethylene glycol/lytes  1 Packet QDAY PRN    And   • magnesium hydroxide  30 mL QDAY PRN    And   • bisacodyl  10 mg QDAY PRN   • acetaminophen  650 mg Q6HRS PRN   • Respiratory Therapy Consult   Continuous RT   • ondansetron  4 mg Q4HRS PRN    Or   • ondansetron  4 mg Q4HRS PRN       Assessment and Plan:  Hospital day #4  Appreciate critical care   Can go to q4hr neuro checks  PT/OT  SBP to be <160mmHg  Hopeful for floor today today  Xanax prn for anxiety  Pt needs repeat CTA in 6 weeks  He is a [facility] patient in the [place] area and wishes to do all f/u there   Answered all questions  Will continue to follow     Prophylactic anticoagulation: no         Start date/time:

## 2020-04-19 NOTE — PROGRESS NOTES
Pt -170. PRNs dc'd by resident. Page to [facility], awaiting callback.     [facility] at bedside, informed they are taking care from ICU team. Questioned availability of PRN blood pressure medications. Was told by resident Dr. Araya to give new order of hydrochlorothiazide and page for PRNs in the future.

## 2020-04-19 NOTE — PROGRESS NOTES
Paged MD regarding high blood pressure. Scheduled lisinopril given, MD instructed this RN to recheck blood pressure in 1 hour and call back if still elevated.

## 2020-04-19 NOTE — CARE PLAN
Problem: Fluid Volume:  Goal: Will maintain balanced intake and output  Outcome: PROGRESSING AS EXPECTED     Problem: Psychosocial Needs:  Goal: Level of anxiety will decrease  Outcome: PROGRESSING SLOWER THAN EXPECTED  Flowsheets (Taken 4/19/2020 0949)  Patient Behaviors: Anxious  Note: PRN Xanax ordered by neurology

## 2020-04-19 NOTE — PROGRESS NOTES
Daily Progress Note:     Date of Service: 4/19/2020  Primary Team: [facility]   Attending: Dr. Lo  Senior Resident: Dr. Macias  Intern: Dr. Araya  Contact:  [phone]    Chief Complaint:   Chief Complaint   Patient presents with   • Headache     Posterior HA started at 1730 today with posterior neck pain   • Neck Pain     with associated nausea     ID:60 year old male with PMH HTN,  transferred from [facility] 4/16/20 with SAH and hypertensive emergency. Admitted to ICU on Nicardipine drip, nimodipine and vasotec. Underwent Cerebral angiogram, which did not show any evidence of aneurysm. Was seen by Neurosurgery, who recommended conservative management.      Subjective:   No acute events overnight. Patient's headache has improved significantly, now 1-3/10 intensity, present in right frontal region.     Interval updates:  -BP over past 24 hours 145-160/55-77. Patient was on Hydralazine q8h, Amlodipine and Nimodipine. Switching antihypertensive regimen to HCTZ, Amlodipine and Lisinopril.  -Off the Oxygen, satting well on RA.  -Voided 6785, net -3890 ml.     Consultants/Specialty:  Neurosurgery  Review of Systems:    Review of Systems   Constitutional: Negative for chills and fever.   HENT: Negative for congestion, ear discharge, ear pain, hearing loss, nosebleeds, sinus pain, sore throat and tinnitus.    Eyes: Negative for blurred vision, double vision, photophobia, pain, discharge and redness.   Respiratory: Negative for cough, hemoptysis, sputum production, shortness of breath, wheezing and stridor.    Cardiovascular: Negative for chest pain, palpitations, orthopnea and leg swelling.   Gastrointestinal: Negative for abdominal pain, constipation, diarrhea, heartburn, nausea and vomiting.   Genitourinary: Negative for dysuria, flank pain, frequency, hematuria and urgency.   Musculoskeletal: Negative for back pain, joint pain, myalgias and neck pain.   Skin: Negative for rash.    Neurological: Positive for headaches. Negative for dizziness, tingling, tremors, sensory change, speech change, focal weakness, seizures, loss of consciousness and weakness.   Endo/Heme/Allergies: Does not bruise/bleed easily.   Psychiatric/Behavioral: Negative for depression and suicidal ideas.       Objective Data:   Physical Exam:   Vitals:   Temp:  [36.9 °C (98.5 °F)-37.2 °C (99 °F)] 36.9 °C (98.5 °F)  Pulse:  [65-85] 75  Resp:  [] 40  BP: (129-182)/(55-86) 180/86  SpO2:  [84 %-96 %] 92 %    Physical Exam  Constitutional:       General: He is not in acute distress.     Appearance: He is obese.   HENT:      Head: Normocephalic and atraumatic.      Nose: Nose normal. No congestion or rhinorrhea.      Mouth/Throat:      Mouth: Mucous membranes are moist.      Pharynx: Oropharynx is clear. No oropharyngeal exudate.   Eyes:      General: No visual field deficit.     Extraocular Movements: Extraocular movements intact.      Conjunctiva/sclera: Conjunctivae normal.      Pupils: Pupils are equal, round, and reactive to light.   Neck:      Musculoskeletal: Normal range of motion and neck supple. No neck rigidity or muscular tenderness.      Vascular: No carotid bruit.   Cardiovascular:      Rate and Rhythm: Normal rate and regular rhythm.      Pulses: Normal pulses.      Heart sounds: Normal heart sounds. No murmur. No gallop.    Pulmonary:      Effort: Pulmonary effort is normal. No respiratory distress.      Breath sounds: Normal breath sounds. No wheezing or rales.   Abdominal:      General: Bowel sounds are normal. There is no distension.      Palpations: Abdomen is soft.      Tenderness: There is no abdominal tenderness. There is no guarding.   Musculoskeletal: Normal range of motion.         General: No swelling or tenderness.      Right lower leg: No edema.      Left lower leg: No edema.   Skin:     General: Skin is warm and dry.      Capillary Refill: Capillary refill takes less than 2 seconds.    Neurological:      Mental Status: He is alert.      GCS: GCS eye subscore is 4. GCS verbal subscore is 5. GCS motor subscore is 6.      Cranial Nerves: Cranial nerves are intact. No cranial nerve deficit, dysarthria or facial asymmetry.      Sensory: Sensation is intact.      Motor: No weakness, tremor, atrophy, abnormal muscle tone, seizure activity or pronator drift.      Coordination: Coordination is intact. Romberg sign negative. Finger-Nose-Finger Test normal.      Deep Tendon Reflexes: Reflexes normal.           Labs:   Recent Labs     04/16/20 2027 04/17/20 0255   WBC 9.9 9.7   RBC 5.37 4.96   HEMOGLOBIN 14.4 13.3*   HEMATOCRIT 44.4 40.3*   MCV 82.7 81.3*   MCH 26.8* 26.8*   RDW 42.3 40.7   PLATELETCT 271 252   MPV 9.5 9.3   NEUTSPOLYS 87.30* 88.10*   LYMPHOCYTES 7.80* 6.00*   MONOCYTES 4.10 5.40   EOSINOPHILS 0.10 0.00   BASOPHILS 0.30 0.20      Recent Labs     04/17/20 0255 04/18/20 0437 04/19/20  0415   SODIUM 139 137 135   POTASSIUM 4.0 3.4* 4.0   CHLORIDE 103 106 101   CO2 22 19* 21   GLUCOSE 209* 154* 138*   BUN 20 18 12      Recent Labs     04/16/20 2027 04/17/20 0255 04/18/20 0437 04/19/20  0415   ALBUMIN 4.4 4.0  --   --    TBILIRUBIN 0.5 0.4  --   --    ALKPHOSPHAT 108* 99  --   --    TOTPROTEIN 7.7 6.8  --   --    ALTSGPT 32 25  --   --    ASTSGOT 21 13  --   --    CREATININE 0.77 0.78 0.80 0.72      Lab Results   Component Value Date/Time    PROTHROMBTM 13.3 04/17/2020 02:55 AM    INR 0.99 04/17/2020 02:55 AM         Imaging:   IR-CAROTID-CEREBRAL BILATERAL   Final Result         1.  Normal cerebral arteriogram. No evidence of aneurysm.   2.  The origin of the left posterior inferior cerebellar artery is cut off on the lateral view and largely obscured on the AP view due to overlapping structures. This was not noticed during the procedure, only at the time of final interpretation.    Attention to this location on follow-up CTA or MRA is recommended.      CT-CTA NECK WITH & W/O-POST  PROCESSING   Final Result      Mild carotid and vertebral artery atherosclerosis without significant stenosis, aneurysm or occlusion      Mild bronchial wall thickening and groundglass could indicate edema. Reactive airways disease/infection is not excluded      CT-CTA HEAD WITH & W/O-POST PROCESS   Final Result      CT angiogram of the [place] is unable to identify source of subarachnoid hemorrhage      Slight new visualization of acute subarachnoid hemorrhage in the lateral ventricles and there is slight temporal lobe dilatation. Attention in follow-up is advised      Otherwise stable moderate acute subarachnoid hemorrhage filling the basal cisterns as above      OUTSIDE IMAGES-CT HEAD   Final Result           * Non-aneurysmal perimesencephalic subarachnoid hemorrhage (HCC)- (present on admission)  Assessment & Plan  Presented on 2020 with neck stiffness, headaches, and nausea/vomiting. Non-con CT showed a SAH. Subsequent CTA did not show an aneurysm or source of SAH (although the patient does have a family history of aneurysms). He does have a history of hypertension, but is not on any anti-hypertensive medications at home. He is on aspirin but no other anti-coagulation.  Cerebral Angiography: negative for aneurysm  [place] [employer] [place] [facility]    Plan:  - Elevate head of bed.  - fall/seizure/aspiration precautions  - Continuing to hold aspirin   - q4h neuro-checks  - SBP <160 as per neurosurgery, PRN medication available.   - Transitioning to Norvasc, lisinopril and HTCZ  - Discontinue Nimodipine, as no evidence of benefit in Nonaneurysmal SAH  - Neurosurgery on board, recommends conservative management    Hypertensive emergency- (present on admission)  Assessment & Plan  The patient initially presented with a BP of 188/135. This may be contributing to SAH.     Plan:  - Strict blood pressure monitoring  - Started on Amlodipine, Lisinopril and HCTZ  - PRN antihypertensives to keep SBP<160 (per  neurosurgery).   - Check plasma renin, aldosterone      Hypokalemia  Assessment & Plan  Monitor and replete prn    Acute respiratory insufficiency  Assessment & Plan  Resolved  Was + 6L since admit, s/p Lasix 20mg IV.  4/19 Satting well on RA      Alcohol abuse, daily use  Assessment & Plan  The patient drinks alcohol daily (usually hard liquor) and uses marijuana occasionally.     Plan:  - Continue to monitor for withdrawal.   - Currently not on CIWA protocol  - cessation counseling and education provided    At risk for obstructive sleep apnea  Assessment & Plan  STOP BANG score 7/8    -Outpatient sleep study    Hypomagnesemia  Assessment & Plan  Monitor and replete prn    Pre-diabetes  Assessment & Plan  A1c was 6.3.     Plan:  - Continue SSI and hypoglycemia protocol to monitor BS.   - Consider lifestyle modification counseling prior to discharge.     Hyperlipidemia  Assessment & Plan  The patient's LDL is elevated at 143 and total cholesterol is 209.     Plan:  - Can start statin when appropriate.      Please note that this dictation was created using voice recognition software. I have made every reasonable attempt to correct obvious errors, but there may be errors of grammar and possibly content that I did not discover before finalizing the note.

## 2020-04-19 NOTE — THERAPY
Occupational Therapy Contact Note    Pt is 61yo male admitted with SAH, presents without deficits in ADLs or ADL transfers, up-self in his hospital room per RN. Pt screened for OT needs, declined functional or focal deficits. Pt edu on safety awareness and subtle changes in activity tolerance he may experience while re-engaging in typical daily activity, as well as equipment to increase home safety should he find it necessary. Full OT evaluation not indicated at this time, pt does not require OT intervention.     Cynthia Guerra, OTR/L  pager# [phone]

## 2020-04-20 ENCOUNTER — APPOINTMENT (OUTPATIENT)
Dept: RADIOLOGY | Facility: MEDICAL CENTER | Age: 60
DRG: 065 | End: 2020-04-20
Attending: STUDENT IN AN ORGANIZED HEALTH CARE EDUCATION/TRAINING PROGRAM
Payer: OTHER MISCELLANEOUS

## 2020-04-20 PROBLEM — E66.09 CLASS 1 OBESITY DUE TO EXCESS CALORIES WITHOUT SERIOUS COMORBIDITY WITH BODY MASS INDEX (BMI) OF 33.0 TO 33.9 IN ADULT: Status: ACTIVE | Noted: 2020-04-20

## 2020-04-20 PROBLEM — E87.1 HYPONATREMIA: Status: ACTIVE | Noted: 2020-04-20

## 2020-04-20 LAB
ANION GAP SERPL CALC-SCNC: 14 MMOL/L (ref 7–16)
ANION GAP SERPL CALC-SCNC: 14 MMOL/L (ref 7–16)
ANION GAP SERPL CALC-SCNC: 16 MMOL/L (ref 7–16)
BUN SERPL-MCNC: 18 MG/DL (ref 8–22)
BUN SERPL-MCNC: 19 MG/DL (ref 8–22)
BUN SERPL-MCNC: 20 MG/DL (ref 8–22)
CALCIUM SERPL-MCNC: 8.7 MG/DL (ref 8.5–10.5)
CALCIUM SERPL-MCNC: 8.7 MG/DL (ref 8.5–10.5)
CALCIUM SERPL-MCNC: 8.9 MG/DL (ref 8.5–10.5)
CHLORIDE SERPL-SCNC: 86 MMOL/L (ref 96–112)
CHLORIDE SERPL-SCNC: 86 MMOL/L (ref 96–112)
CHLORIDE SERPL-SCNC: 89 MMOL/L (ref 96–112)
CO2 SERPL-SCNC: 21 MMOL/L (ref 20–33)
CO2 SERPL-SCNC: 23 MMOL/L (ref 20–33)
CO2 SERPL-SCNC: 24 MMOL/L (ref 20–33)
CREAT SERPL-MCNC: 0.72 MG/DL (ref 0.5–1.4)
CREAT SERPL-MCNC: 0.73 MG/DL (ref 0.5–1.4)
CREAT SERPL-MCNC: 0.79 MG/DL (ref 0.5–1.4)
ERYTHROCYTE [DISTWIDTH] IN BLOOD BY AUTOMATED COUNT: 39.2 FL (ref 35.9–50)
GLUCOSE BLD-MCNC: 120 MG/DL (ref 65–99)
GLUCOSE BLD-MCNC: 137 MG/DL (ref 65–99)
GLUCOSE SERPL-MCNC: 126 MG/DL (ref 65–99)
GLUCOSE SERPL-MCNC: 138 MG/DL (ref 65–99)
GLUCOSE SERPL-MCNC: 148 MG/DL (ref 65–99)
HCT VFR BLD AUTO: 43 % (ref 42–52)
HGB BLD-MCNC: 14.5 G/DL (ref 14–18)
IRON SATN MFR SERPL: 15 % (ref 15–55)
IRON SERPL-MCNC: 30 UG/DL (ref 50–180)
MAGNESIUM SERPL-MCNC: 2 MG/DL (ref 1.5–2.5)
MCH RBC QN AUTO: 26.9 PG (ref 27–33)
MCHC RBC AUTO-ENTMCNC: 33.7 G/DL (ref 33.7–35.3)
MCV RBC AUTO: 79.6 FL (ref 81.4–97.8)
PLATELET # BLD AUTO: 289 K/UL (ref 164–446)
PMV BLD AUTO: 9.3 FL (ref 9–12.9)
POTASSIUM SERPL-SCNC: 3.5 MMOL/L (ref 3.6–5.5)
POTASSIUM SERPL-SCNC: 3.5 MMOL/L (ref 3.6–5.5)
POTASSIUM SERPL-SCNC: 3.8 MMOL/L (ref 3.6–5.5)
RBC # BLD AUTO: 5.4 M/UL (ref 4.7–6.1)
SODIUM SERPL-SCNC: 123 MMOL/L (ref 135–145)
SODIUM SERPL-SCNC: 123 MMOL/L (ref 135–145)
SODIUM SERPL-SCNC: 127 MMOL/L (ref 135–145)
TIBC SERPL-MCNC: 206 UG/DL (ref 250–450)
TSH SERPL DL<=0.005 MIU/L-ACNC: 0.88 UIU/ML (ref 0.38–5.33)
UIBC SERPL-MCNC: 176 UG/DL (ref 110–370)
WBC # BLD AUTO: 12.1 K/UL (ref 4.8–10.8)

## 2020-04-20 PROCEDURE — A9270 NON-COVERED ITEM OR SERVICE: HCPCS | Performed by: INTERNAL MEDICINE

## 2020-04-20 PROCEDURE — 700105 HCHG RX REV CODE 258: Performed by: INTERNAL MEDICINE

## 2020-04-20 PROCEDURE — 83540 ASSAY OF IRON: CPT

## 2020-04-20 PROCEDURE — 700102 HCHG RX REV CODE 250 W/ 637 OVERRIDE(OP): Performed by: INTERNAL MEDICINE

## 2020-04-20 PROCEDURE — 85027 COMPLETE CBC AUTOMATED: CPT

## 2020-04-20 PROCEDURE — 99233 SBSQ HOSP IP/OBS HIGH 50: CPT | Mod: GC | Performed by: INTERNAL MEDICINE

## 2020-04-20 PROCEDURE — 700102 HCHG RX REV CODE 250 W/ 637 OVERRIDE(OP): Performed by: STUDENT IN AN ORGANIZED HEALTH CARE EDUCATION/TRAINING PROGRAM

## 2020-04-20 PROCEDURE — 83735 ASSAY OF MAGNESIUM: CPT

## 2020-04-20 PROCEDURE — 80048 BASIC METABOLIC PNL TOTAL CA: CPT

## 2020-04-20 PROCEDURE — A9270 NON-COVERED ITEM OR SERVICE: HCPCS | Performed by: STUDENT IN AN ORGANIZED HEALTH CARE EDUCATION/TRAINING PROGRAM

## 2020-04-20 PROCEDURE — 93975 VASCULAR STUDY: CPT

## 2020-04-20 PROCEDURE — 83550 IRON BINDING TEST: CPT

## 2020-04-20 PROCEDURE — 82962 GLUCOSE BLOOD TEST: CPT

## 2020-04-20 PROCEDURE — 700111 HCHG RX REV CODE 636 W/ 250 OVERRIDE (IP): Performed by: INTERNAL MEDICINE

## 2020-04-20 PROCEDURE — 770022 HCHG ROOM/CARE - ICU (200)

## 2020-04-20 PROCEDURE — 84443 ASSAY THYROID STIM HORMONE: CPT

## 2020-04-20 PROCEDURE — 99291 CRITICAL CARE FIRST HOUR: CPT | Performed by: INTERNAL MEDICINE

## 2020-04-20 RX ORDER — CARVEDILOL 6.25 MG/1
12.5 TABLET ORAL 2 TIMES DAILY WITH MEALS
Status: DISCONTINUED | OUTPATIENT
Start: 2020-04-20 | End: 2020-04-23

## 2020-04-20 RX ORDER — HYDROCHLOROTHIAZIDE 25 MG/1
25 TABLET ORAL
Status: DISCONTINUED | OUTPATIENT
Start: 2020-04-21 | End: 2020-04-20

## 2020-04-20 RX ORDER — POTASSIUM CHLORIDE 20 MEQ/1
20 TABLET, EXTENDED RELEASE ORAL ONCE
Status: COMPLETED | OUTPATIENT
Start: 2020-04-20 | End: 2020-04-20

## 2020-04-20 RX ORDER — LISINOPRIL 10 MG/1
10 TABLET ORAL ONCE
Status: COMPLETED | OUTPATIENT
Start: 2020-04-20 | End: 2020-04-20

## 2020-04-20 RX ORDER — 3% SODIUM CHLORIDE 3 G/100ML
500 INJECTION, SOLUTION INTRAVENOUS CONTINUOUS
Status: CANCELLED | OUTPATIENT
Start: 2020-04-20

## 2020-04-20 RX ORDER — POTASSIUM CHLORIDE 20 MEQ/1
40 TABLET, EXTENDED RELEASE ORAL ONCE
Status: COMPLETED | OUTPATIENT
Start: 2020-04-20 | End: 2020-04-20

## 2020-04-20 RX ORDER — CHLORTHALIDONE 25 MG/1
25 TABLET ORAL
Status: DISCONTINUED | OUTPATIENT
Start: 2020-04-21 | End: 2020-04-20

## 2020-04-20 RX ORDER — SODIUM CHLORIDE 1 G/1
1 TABLET ORAL
Status: DISCONTINUED | OUTPATIENT
Start: 2020-04-20 | End: 2020-04-21

## 2020-04-20 RX ORDER — HYDROXYZINE HYDROCHLORIDE 25 MG/1
50 TABLET, FILM COATED ORAL 3 TIMES DAILY PRN
Status: DISCONTINUED | OUTPATIENT
Start: 2020-04-20 | End: 2020-04-28 | Stop reason: HOSPADM

## 2020-04-20 RX ORDER — ENALAPRILAT 1.25 MG/ML
0.62 INJECTION INTRAVENOUS EVERY 6 HOURS PRN
Status: DISCONTINUED | OUTPATIENT
Start: 2020-04-20 | End: 2020-04-28 | Stop reason: HOSPADM

## 2020-04-20 RX ORDER — LISINOPRIL 20 MG/1
20 TABLET ORAL
Status: DISCONTINUED | OUTPATIENT
Start: 2020-04-21 | End: 2020-04-22

## 2020-04-20 RX ORDER — CHLORTHALIDONE 25 MG/1
25 TABLET ORAL
Status: DISCONTINUED | OUTPATIENT
Start: 2020-04-20 | End: 2020-04-20

## 2020-04-20 RX ORDER — LABETALOL HYDROCHLORIDE 5 MG/ML
10 INJECTION, SOLUTION INTRAVENOUS EVERY 6 HOURS PRN
Status: DISCONTINUED | OUTPATIENT
Start: 2020-04-20 | End: 2020-04-22

## 2020-04-20 RX ORDER — HYDROCHLOROTHIAZIDE 25 MG/1
12.5 TABLET ORAL ONCE
Status: COMPLETED | OUTPATIENT
Start: 2020-04-20 | End: 2020-04-20

## 2020-04-20 RX ORDER — HYDRALAZINE HYDROCHLORIDE 20 MG/ML
10-20 INJECTION INTRAMUSCULAR; INTRAVENOUS EVERY 6 HOURS PRN
Status: DISCONTINUED | OUTPATIENT
Start: 2020-04-20 | End: 2020-04-22

## 2020-04-20 RX ORDER — SPIRONOLACTONE 25 MG/1
25 TABLET ORAL
Status: DISCONTINUED | OUTPATIENT
Start: 2020-04-20 | End: 2020-04-20

## 2020-04-20 RX ORDER — 3% SODIUM CHLORIDE 3 G/100ML
INJECTION, SOLUTION INTRAVENOUS CONTINUOUS
Status: DISCONTINUED | OUTPATIENT
Start: 2020-04-20 | End: 2020-04-26

## 2020-04-20 RX ADMIN — AMLODIPINE BESYLATE 10 MG: 5 TABLET ORAL at 04:03

## 2020-04-20 RX ADMIN — LISINOPRIL 10 MG: 10 TABLET ORAL at 05:48

## 2020-04-20 RX ADMIN — HYDROCHLOROTHIAZIDE 12.5 MG: 25 TABLET ORAL at 05:48

## 2020-04-20 RX ADMIN — ACETAMINOPHEN 650 MG: 325 TABLET, FILM COATED ORAL at 20:10

## 2020-04-20 RX ADMIN — ACETAMINOPHEN 650 MG: 325 TABLET, FILM COATED ORAL at 13:13

## 2020-04-20 RX ADMIN — LABETALOL HYDROCHLORIDE 20 MG: 5 INJECTION, SOLUTION INTRAVENOUS at 08:21

## 2020-04-20 RX ADMIN — HYDRALAZINE HYDROCHLORIDE 25 MG: 25 TABLET, FILM COATED ORAL at 04:03

## 2020-04-20 RX ADMIN — Medication 1 G: at 16:41

## 2020-04-20 RX ADMIN — SPIRONOLACTONE 25 MG: 25 TABLET ORAL at 08:15

## 2020-04-20 RX ADMIN — HYDRALAZINE HYDROCHLORIDE 25 MG: 25 TABLET, FILM COATED ORAL at 13:13

## 2020-04-20 RX ADMIN — SENNOSIDES AND DOCUSATE SODIUM 2 TABLET: 8.6; 5 TABLET ORAL at 04:03

## 2020-04-20 RX ADMIN — CARVEDILOL 12.5 MG: 6.25 TABLET, FILM COATED ORAL at 11:02

## 2020-04-20 RX ADMIN — HYDROCHLOROTHIAZIDE 12.5 MG: 12.5 TABLET ORAL at 04:04

## 2020-04-20 RX ADMIN — HYDRALAZINE HYDROCHLORIDE 20 MG: 20 INJECTION INTRAMUSCULAR; INTRAVENOUS at 20:41

## 2020-04-20 RX ADMIN — ACETAMINOPHEN 650 MG: 325 TABLET, FILM COATED ORAL at 05:47

## 2020-04-20 RX ADMIN — POTASSIUM CHLORIDE 40 MEQ: 1500 TABLET, EXTENDED RELEASE ORAL at 08:16

## 2020-04-20 RX ADMIN — CARVEDILOL 12.5 MG: 6.25 TABLET, FILM COATED ORAL at 16:41

## 2020-04-20 RX ADMIN — SENNOSIDES AND DOCUSATE SODIUM 2 TABLET: 8.6; 5 TABLET ORAL at 16:41

## 2020-04-20 RX ADMIN — POTASSIUM CHLORIDE 20 MEQ: 1500 TABLET, EXTENDED RELEASE ORAL at 20:10

## 2020-04-20 RX ADMIN — ANTACID TABLETS 1000 MG: 500 TABLET, CHEWABLE ORAL at 11:09

## 2020-04-20 RX ADMIN — LISINOPRIL 10 MG: 10 TABLET ORAL at 04:03

## 2020-04-20 RX ADMIN — SODIUM CHLORIDE 30 ML: 3 INJECTION, SOLUTION INTRAVENOUS at 20:32

## 2020-04-20 ASSESSMENT — ENCOUNTER SYMPTOMS
BRUISES/BLEEDS EASILY: 0
CHILLS: 0
INSOMNIA: 1
DIZZINESS: 0
MYALGIAS: 0
NERVOUS/ANXIOUS: 0
HALLUCINATIONS: 0
NAUSEA: 0
DIARRHEA: 0
SEIZURES: 0
ABDOMINAL PAIN: 0
FOCAL WEAKNESS: 0
BLURRED VISION: 0
BACK PAIN: 0
COUGH: 0
HEADACHES: 1
WEAKNESS: 0
LOSS OF CONSCIOUSNESS: 0
EYE PAIN: 0
PALPITATIONS: 0
HEARTBURN: 0
CONSTIPATION: 0
EYE DISCHARGE: 0
PHOTOPHOBIA: 0
HEMOPTYSIS: 0
SPEECH CHANGE: 0
NERVOUS/ANXIOUS: 1
BLOOD IN STOOL: 0
SHORTNESS OF BREATH: 0
SINUS PAIN: 0
EYE REDNESS: 0
NECK PAIN: 0
SENSORY CHANGE: 0
SORE THROAT: 0
SPUTUM PRODUCTION: 0
DEPRESSION: 0
TINGLING: 0
VOMITING: 0
DOUBLE VISION: 0
FEVER: 0
TREMORS: 0

## 2020-04-20 ASSESSMENT — FIBROSIS 4 INDEX: FIB4 SCORE: 0.53

## 2020-04-20 NOTE — ASSESSMENT & PLAN NOTE
Due to cerebral salt wasting syndrome. Stabilized on hypertonic saline.  4/26 Sodium in the past 24 hours ranged between 131-138.    Discontinued hypertonic saline    -We will continue to trend sodium every 12 hourly.  If sodium falls to less than 130, will restart hypertonic saline.  -Discontinue fludrocortisone : will monitor for hypotension/ adrenal insufficiency. Will check 8am cortisol tomorrow.  -Continue salt tabs 4g TID  -Continue free water restriction

## 2020-04-20 NOTE — CARE PLAN
Problem: Communication  Goal: The ability to communicate needs accurately and effectively will improve  Outcome: PROGRESSING AS EXPECTED     Problem: Safety  Goal: Will remain free from falls  Outcome: PROGRESSING AS EXPECTED  Note: Fall precautions in place.

## 2020-04-20 NOTE — PROGRESS NOTES
Daily Progress Note:     Date of Service: 4/20/2020  Primary Team: [facility] Team   Attending: Dr. Lo  Senior Resident: Dr. Macias  Intern: Dr. Araya  Contact:  [phone]    Chief Complaint:   Chief Complaint   Patient presents with   • Headache     Posterior HA started at 1730 today with posterior neck pain   • Neck Pain     with associated nausea     ID::60 year old male with PMH HTN,  transferred from [facility] 4/16/20 with SAH and hypertensive emergency. Admitted to ICU on Nicardipine drip, nimodipine and vasotec. Underwent Cerebral angiogram, which did not show any evidence of aneurysm. Was seen by Neurosurgery, who recommended conservative management.     Subjective:   Patient is feeling anxious this am, unable to sleep much at night, but does not want any medications.  His headache is 3 out of 10.    Interval updates:  -Patient's blood pressure continues to be elevated in the 170s to 180s/70-90.  -Intake 800, output 1775, net 975 negative.  -Labs are significant for sodium down from 135-127, chloride 89, potassium 3.5.  -Discontinuing hydrochlorothiazide in view of hyponatremia.  Starting patient on Coreg 12.5 twice daily.  -We will recheck BMP at 3pm to monitor electrolyte levels.  -Renal artery duplex was negative for renal artery stenosis, abdominal aortic aneurysm.  Plasma renin and aldosterone levels pending.  -Discussed with neurosurgery, recommended Na tabs 3g/day, repeat Na q6h. If continues to trend down, may need hypertonic saline.    Consultants/Specialty:  Neurosurgery  Review of Systems:    Review of Systems   Constitutional: Negative for chills and fever.   HENT: Negative for ear pain, hearing loss, sinus pain, sore throat and tinnitus.    Eyes: Negative for blurred vision, double vision, photophobia, pain, discharge and redness.   Respiratory: Negative for cough, sputum production and shortness of breath.    Cardiovascular: Negative for chest pain, palpitations and leg  swelling.   Gastrointestinal: Negative for abdominal pain, blood in stool, constipation, diarrhea, heartburn, melena, nausea and vomiting.   Genitourinary: Negative for dysuria, frequency and urgency.   Musculoskeletal: Negative for back pain, myalgias and neck pain.   Skin: Negative for rash.   Neurological: Positive for headaches (Frontal, bilateral 3 out of 10, dull ache). Negative for dizziness, tingling, tremors, sensory change, speech change, focal weakness, seizures, loss of consciousness and weakness.   Psychiatric/Behavioral: Negative for depression, hallucinations and suicidal ideas. The patient is nervous/anxious and has insomnia.        Objective Data:   Physical Exam:   Vitals:   Temp:  [36.4 °C (97.6 °F)-37.6 °C (99.6 °F)] 37.5 °C (99.5 °F)  Pulse:  [63-82] 75  Resp:  [16] 16  BP: (157-178)/(70-99) 169/99  SpO2:  [96 %-99 %] 97 %    Physical Exam  Constitutional:       General: He is not in acute distress.     Appearance: He is obese.   HENT:      Head: Normocephalic and atraumatic.      Nose: Nose normal.      Mouth/Throat:      Mouth: Mucous membranes are moist.      Pharynx: Oropharynx is clear.   Eyes:      Extraocular Movements: Extraocular movements intact.      Conjunctiva/sclera: Conjunctivae normal.      Pupils: Pupils are equal, round, and reactive to light.   Neck:      Musculoskeletal: Normal range of motion and neck supple. No neck rigidity or muscular tenderness.      Vascular: No carotid bruit.   Cardiovascular:      Rate and Rhythm: Normal rate and regular rhythm.      Pulses: Normal pulses.      Heart sounds: Normal heart sounds. No murmur. No gallop.    Pulmonary:      Effort: Pulmonary effort is normal. No respiratory distress.      Breath sounds: Normal breath sounds. No wheezing or rales.   Abdominal:      General: Bowel sounds are normal. There is no distension.      Palpations: Abdomen is soft.      Tenderness: There is no abdominal tenderness. There is no guarding.    Musculoskeletal: Normal range of motion.         General: No swelling or deformity.      Right lower leg: No edema.      Left lower leg: No edema.   Skin:     General: Skin is warm and dry.      Capillary Refill: Capillary refill takes less than 2 seconds.   Neurological:      Mental Status: He is alert. Mental status is at baseline.      GCS: GCS eye subscore is 4. GCS verbal subscore is 5. GCS motor subscore is 6.      Cranial Nerves: No cranial nerve deficit.      Sensory: No sensory deficit.      Motor: No weakness, abnormal muscle tone, seizure activity or pronator drift.      Coordination: Coordination normal.      Deep Tendon Reflexes: Reflexes are normal and symmetric. Babinski sign absent on the right side. Babinski sign absent on the left side.   Psychiatric:         Mood and Affect: Mood normal.         Behavior: Behavior normal.         Judgment: Judgment normal.       Quality Measures  Quality-Core Measures   Reviewed items::  Labs reviewed and Medications reviewed  Cardona catheter::  No Cardona  DVT prophylaxis pharmacological::  Contraindicated - High bleeding risk  DVT prophylaxis - mechanical:  SCDs  Ulcer Prophylaxis::  Not indicated    Labs:   Recent Labs     04/20/20  0507   WBC 12.1*   RBC 5.40   HEMOGLOBIN 14.5   HEMATOCRIT 43.0   MCV 79.6*   MCH 26.9*   RDW 39.2   PLATELETCT 289   MPV 9.3      Recent Labs     04/18/20  0437 04/19/20  0415 04/20/20  0507   SODIUM 137 135 127*   POTASSIUM 3.4* 4.0 3.5*   CHLORIDE 106 101 89*   CO2 19* 21 24   GLUCOSE 154* 138* 138*   BUN 18 12 18      Recent Labs     04/18/20  0437 04/19/20  0415 04/20/20  0507   CREATININE 0.80 0.72 0.73      Lab Results   Component Value Date/Time    PROTHROMBTM 13.3 04/17/2020 02:55 AM    INR 0.99 04/17/2020 02:55 AM         Imaging:   US-RENAL ARTERY DUPLEX COMP   Final Result      IR-CAROTID-CEREBRAL BILATERAL   Final Result         1.  Normal cerebral arteriogram. No evidence of aneurysm.   2.  The origin of the left  posterior inferior cerebellar artery is cut off on the lateral view and largely obscured on the AP view due to overlapping structures. This was not noticed during the procedure, only at the time of final interpretation.    Attention to this location on follow-up CTA or MRA is recommended.      CT-CTA NECK WITH & W/O-POST PROCESSING   Final Result      Mild carotid and vertebral artery atherosclerosis without significant stenosis, aneurysm or occlusion      Mild bronchial wall thickening and groundglass could indicate edema. Reactive airways disease/infection is not excluded      CT-CTA HEAD WITH & W/O-POST PROCESS   Final Result      CT angiogram of the [place] is unable to identify source of subarachnoid hemorrhage      Slight new visualization of acute subarachnoid hemorrhage in the lateral ventricles and there is slight temporal lobe dilatation. Attention in follow-up is advised      Otherwise stable moderate acute subarachnoid hemorrhage filling the basal cisterns as above      OUTSIDE IMAGES-CT HEAD   Final Result           * Non-aneurysmal perimesencephalic subarachnoid hemorrhage (HCC)- (present on admission)  Assessment & Plan  Presented on 2020 with neck stiffness, headaches, and nausea/vomiting. Non-con CT showed a SAH. Subsequent CTA did not show an aneurysm or source of SAH (although the patient does have a family history of aneurysms). He does have a history of hypertension, but is not on any anti-hypertensive medications at home. He is on aspirin but no other anti-coagulation.  Cerebral Angiography: negative for aneurysm  [place] [employer] gradin  Status post nicardipine drip and nimodipine.  No PT OT needs identified.    Plan:  - Elevate head of bed.  - fall/seizure/aspiration precautions  - Hold aspirin   - q4h neuro-checks  - SBP <160 as per neurosurgery, PRN medication available.   - BP control with Norvasc, lisinopril and Coreg.  Discontinued hydrochlorothiazide in view of  hyponatremia  - Discontinue Nimodipine, as no evidence of benefit in Nonaneurysmal SAH  - Neurosurgery on board, recommends conservative management  - Repeat CTA in 6 weeks, patient will follow in [place] with [facility]    Hyponatremia  Assessment & Plan  Sodium down from 135->127.  Secondary to diuresis (patient received 1 dose IV Lasix, and was on hydrochlorothiazide with good urine output)    -Hold hydrochlorothiazide  -Discussed with neurosurgery, recommended Na tabs 3g/day, repeat Na q6h. If continues to trend down, may need hypertonic saline.    Hypertensive emergency- (present on admission)  Assessment & Plan  The patient initially presented with a BP of 188/135. This may be contributing to SAH.   4/20 continues to have high blood pressures in the 170s to 180s systolic.  Renal artery duplex was negative for renal artery stenosis.  Patient also has anxiety, and is unable to sleep at night.  This could potentially be attribute to his high blood pressure.  However he does not want to take any narcotics, sedatives.  He also has a history of 2-3 drinks, hard liquor every day, it is possible he is withdrawing mildly.    Plan:  - Strict blood pressure monitoring, to keep SBP<160 (per neurosurgery)  - BP regimen: Amlodipine, Lisinopril, hydralazine and Coreg  - Hydrochlorothiazide was discontinued in view of hyponatremia  - PRN antihypertensives: Labetalol, may add small dose hydralazine PRN if not controlled  - Added hydroxyzine PRN for anxiety.  - Follow plasma renin, aldosterone  - Check TSH      Hypokalemia  Assessment & Plan  Monitor and replete prn    Acute respiratory insufficiency  Assessment & Plan  Resolved  Was + 6L since admit, s/p Lasix 20mg IV.  4/19-20 Satting well on RA      Alcohol abuse, daily use  Assessment & Plan  The patient drinks alcohol daily (usually hard liquor, 2 to 3 glasses) and uses marijuana occasionally.   Does not appear to be withdrawing    Plan:  - Continue to monitor for  withdrawal.   - Currently not on CIWA protocol  - cessation counseling and education provided    At risk for obstructive sleep apnea  Assessment & Plan  STOP BANG score 7/8    -Outpatient sleep study    Hypomagnesemia  Assessment & Plan  Monitor and replete prn    Pre-diabetes  Assessment & Plan  A1c was 6.3.     Plan:  - Continue SSI and hypoglycemia protocol to monitor BS.   - Consider lifestyle modification counseling prior to discharge.     Hyperlipidemia  Assessment & Plan  The patient's LDL is elevated at 143 and total cholesterol is 209.     Plan:  - Can start statin when appropriate.      Please note that this dictation was created using voice recognition software. I have made every reasonable attempt to correct obvious errors, but there may be errors of grammar and possibly content that I did not discover before finalizing the note.

## 2020-04-20 NOTE — PROGRESS NOTES
Contacted on call APRN for neuro surgery for further orders to manage patient's BP.    Discussed with Jay JOY with Neurosurgery and received order to admin trazodone 25mg and recheck in 1 hour. If BP is still elevated give 1.25mg after 1 hour.

## 2020-04-20 NOTE — PROGRESS NOTES
Monitor summary: SB-SR 58-69, NH 0.22, QRS 0.10, QT 0.48, with rare PVCs and rare trigeminy per strip from monitor room.

## 2020-04-20 NOTE — PROGRESS NOTES
TRANSFER TO ICU NOTE  Mr. Rae is a 60 y.o. male with a PMHx of HTN, initially transferred from [facility] 4/16/2020 with subarachnoid hemorrhage and hypertensive emergency.  Patient was admitted to ICU on nicardipine drip, new body pain and Vasotec.  He underwent cerebral angiogram did not show any evidence of aneurysm.  Was seen by neurosurgery, Dr. Randolph who recommended conservative management.  Patient was transferred to the floor on 4/18/2020 in order to start him on appropriate antihypertensive regimen for home, with goal SBP less than 160.  On the floor, off the nicardipine drip, patient demonstrated resistant hypertension with systolic blood pressure in 160s to 170s, while on hydrochlorothiazide, lisinopril, amlodipine and scheduled hydralazine.  Plasma renin and aldosterone levels were drawn and are currently pending.  Renal duplex ultrasound did not show any renal artery stenosis.  Patient sodium level dropped from 135->127.  He had received 1 dose of Lasix in the ICU and was on hydrochlorothiazide for 1 day.  Hydrochlorothiazide was discontinued in view of hyponatremia.  Neurosurgery was updated about patient's sodium levels and recommended starting patient on salt tablets 3 g/day with every 6 hours monitoring of sodium.  Patient's sodium continued to drop to 123.   Discussed with Neurosurgery PA, Daphne Magallanes, about drop in sodium from 127->123.  This update was discussed by neurosurgery PA with Dr. Randolph, who recommended transferring patient to the ICU for initiation of hypertonic saline protocol for concern of cerebral salt wasting.  Sodium goal will be 135-145.  Discussed case with ICU resident Dr. Carmen Barker, who agreed to evaluate the patient and transfer to ICU.    Patient will be transferred to ICU for continuation of care and optimization of medications.     THINGS TO FOLLOW   -Plasma renin and aldosterone level  -Correction of hyponatremia on hypertonic saline  protocol  -Blood pressure optimization  -Follow-up on neurosurgery recommendations

## 2020-04-20 NOTE — PROGRESS NOTES
Paged on call resident [facility] green for elevated BP after giving BP meds    Discussed with Dr. Brown about patient's BP being elevated and all PRN and meds being given. Initially received order for 2.5mg amlodipine. Also mentioned that patient requested melatonin for sleep. MD ordered 25 mg of trazodone and said to cancel order for 2.5 mg amlodipine. Reminded MD that they wanted to keep BP under 160 and MD stated to give trazodone and continue to monitor.

## 2020-04-20 NOTE — PROGRESS NOTES
Neurosurgery Progress Note    Subjective:  Patient presents with SAH with severe headache  Speaking with patient he has HTN and was in a verbal confrontation over the phone when his symptoms presented.     No aneurysm seen during IR procedure.     No new symptoms overnight  More mild headache now  Patient is very anxious today  Still hypertensive despite multiple medications    Renal Artery US appears to be normal    Exam:  GCS 15  No drift, grossly strong  Appropriate   EOM intact, pupils equal   Very anxious     BP  Min: 157/84  Max: 180/86  Pulse  Av.5  Min: 62  Max: 82  Resp  Av  Min: 16  Max: 16  Temp  Av °C (98.6 °F)  Min: 36.4 °C (97.6 °F)  Max: 37.6 °C (99.6 °F)  SpO2  Av.3 %  Min: 92 %  Max: 99 %    No data recorded    Recent Labs     20  0507   WBC 12.1*   RBC 5.40   HEMOGLOBIN 14.5   HEMATOCRIT 43.0   MCV 79.6*   MCH 26.9*   MCHC 33.7   RDW 39.2   PLATELETCT 289   MPV 9.3     Recent Labs     20  0437 20  0415 20  0507   SODIUM 137 135 127*   POTASSIUM 3.4* 4.0 3.5*   CHLORIDE 106 101 89*   CO2 19* 21 24   GLUCOSE 154* 138* 138*   BUN 18 12 18   CREATININE 0.80 0.72 0.73   CALCIUM 7.8* 8.6 8.7               Intake/Output       20 0700 - 20 0659 20 07 - 20 0659       Total 1900-0659 Total       Intake    P.O.  300  500 800  --  -- --    P.O. 300 500 800 -- -- --    Total Intake 300 500 800 -- -- --       Output    Urine  1350  425 1775  300  -- 300    Number of Times Voided 6 x -- 6 x 1 x -- 1 x    Urine Void (mL) 1914 426 1405 300 -- 300    Stool  --  -- --  --  -- --    Number of Times Stooled 0 x -- 0 x -- -- --    Total Output 8259 160 6120 300 -- 300       Net I/O     -1050 75 -975 -300 -- -300            Intake/Output Summary (Last 24 hours) at 2020 0840  Last data filed at 2020 0800  Gross per 24 hour   Intake 700 ml   Output 1725 ml   Net -1025 ml            • [START ON 2020] lisinopril   20 mg Q DAY   • spironolactone  25 mg Q DAY   • amLODIPine  10 mg Q DAY   • ALPRAZolam  0.25 mg TID PRN   • labetalol  20 mg Q6HRS PRN   • hydrALAZINE  25 mg Q8HRS   • traZODone  25 mg Once   • fluticasone  2 Spray DAILY   • calcium carbonate  1,000 mg Q6HRS PRN   • hyoscyamine-maalox plus-lidocaine viscous  30 mL Once PRN   • senna-docusate  2 Tab BID    And   • polyethylene glycol/lytes  1 Packet QDAY PRN    And   • magnesium hydroxide  30 mL QDAY PRN    And   • bisacodyl  10 mg QDAY PRN   • acetaminophen  650 mg Q6HRS PRN   • Respiratory Therapy Consult   Continuous RT   • ondansetron  4 mg Q4HRS PRN    Or   • ondansetron  4 mg Q4HRS PRN       Assessment and Plan:  Hospital day #5  Appreciate hospitalist  Q 4hr neuro checks  PT/OT  SBP to be <160mmHg  Xanax prn for anxiety  Pt needs repeat CTA in 6 weeks  He is a [facility] patient in the [place] area and wishes to do all f/u there   Answered all questions  Will continue to follow   Renal ultrasound normal    Prophylactic anticoagulation: no         Start date/time:

## 2020-04-20 NOTE — PROGRESS NOTES
Monitor Summary: SR 66-76, PA .18, QRS .10, QT .38 with trigem & rare PVC per strip from monitor room

## 2020-04-21 LAB
ALDOST SERPL-MCNC: <3 NG/DL
ANION GAP SERPL CALC-SCNC: 13 MMOL/L (ref 7–16)
ANION GAP SERPL CALC-SCNC: 14 MMOL/L (ref 7–16)
ANION GAP SERPL CALC-SCNC: 15 MMOL/L (ref 7–16)
ANION GAP SERPL CALC-SCNC: 16 MMOL/L (ref 7–16)
BASOPHILS # BLD AUTO: 0.3 % (ref 0–1.8)
BASOPHILS # BLD: 0.04 K/UL (ref 0–0.12)
BUN SERPL-MCNC: 20 MG/DL (ref 8–22)
BUN SERPL-MCNC: 20 MG/DL (ref 8–22)
BUN SERPL-MCNC: 22 MG/DL (ref 8–22)
BUN SERPL-MCNC: 23 MG/DL (ref 8–22)
CALCIUM SERPL-MCNC: 7.9 MG/DL (ref 8.5–10.5)
CALCIUM SERPL-MCNC: 8 MG/DL (ref 8.5–10.5)
CALCIUM SERPL-MCNC: 8.7 MG/DL (ref 8.5–10.5)
CALCIUM SERPL-MCNC: 8.8 MG/DL (ref 8.5–10.5)
CHLORIDE SERPL-SCNC: 87 MMOL/L (ref 96–112)
CHLORIDE SERPL-SCNC: 90 MMOL/L (ref 96–112)
CHLORIDE SERPL-SCNC: 94 MMOL/L (ref 96–112)
CHLORIDE SERPL-SCNC: 99 MMOL/L (ref 96–112)
CO2 SERPL-SCNC: 18 MMOL/L (ref 20–33)
CO2 SERPL-SCNC: 20 MMOL/L (ref 20–33)
CO2 SERPL-SCNC: 20 MMOL/L (ref 20–33)
CO2 SERPL-SCNC: 21 MMOL/L (ref 20–33)
CREAT SERPL-MCNC: 0.69 MG/DL (ref 0.5–1.4)
CREAT SERPL-MCNC: 0.7 MG/DL (ref 0.5–1.4)
CREAT SERPL-MCNC: 0.79 MG/DL (ref 0.5–1.4)
CREAT SERPL-MCNC: 0.83 MG/DL (ref 0.5–1.4)
EOSINOPHIL # BLD AUTO: 0.01 K/UL (ref 0–0.51)
EOSINOPHIL NFR BLD: 0.1 % (ref 0–6.9)
ERYTHROCYTE [DISTWIDTH] IN BLOOD BY AUTOMATED COUNT: 36.2 FL (ref 35.9–50)
GLUCOSE SERPL-MCNC: 114 MG/DL (ref 65–99)
GLUCOSE SERPL-MCNC: 114 MG/DL (ref 65–99)
GLUCOSE SERPL-MCNC: 115 MG/DL (ref 65–99)
GLUCOSE SERPL-MCNC: 119 MG/DL (ref 65–99)
HCT VFR BLD AUTO: 40.6 % (ref 42–52)
HGB BLD-MCNC: 14.4 G/DL (ref 14–18)
IMM GRANULOCYTES # BLD AUTO: 0.09 K/UL (ref 0–0.11)
IMM GRANULOCYTES NFR BLD AUTO: 0.8 % (ref 0–0.9)
LYMPHOCYTES # BLD AUTO: 1.24 K/UL (ref 1–4.8)
LYMPHOCYTES NFR BLD: 10.7 % (ref 22–41)
MAGNESIUM SERPL-MCNC: 2 MG/DL (ref 1.5–2.5)
MCH RBC QN AUTO: 27.1 PG (ref 27–33)
MCHC RBC AUTO-ENTMCNC: 35.5 G/DL (ref 33.7–35.3)
MCV RBC AUTO: 76.3 FL (ref 81.4–97.8)
MONOCYTES # BLD AUTO: 1.81 K/UL (ref 0–0.85)
MONOCYTES NFR BLD AUTO: 15.7 % (ref 0–13.4)
NEUTROPHILS # BLD AUTO: 8.35 K/UL (ref 1.82–7.42)
NEUTROPHILS NFR BLD: 72.4 % (ref 44–72)
NRBC # BLD AUTO: 0 K/UL
NRBC BLD-RTO: 0 /100 WBC
PHOSPHATE SERPL-MCNC: 3 MG/DL (ref 2.5–4.5)
PLATELET # BLD AUTO: 248 K/UL (ref 164–446)
PMV BLD AUTO: 8.9 FL (ref 9–12.9)
POTASSIUM SERPL-SCNC: 3.4 MMOL/L (ref 3.6–5.5)
POTASSIUM SERPL-SCNC: 3.7 MMOL/L (ref 3.6–5.5)
POTASSIUM SERPL-SCNC: 3.7 MMOL/L (ref 3.6–5.5)
POTASSIUM SERPL-SCNC: 4.3 MMOL/L (ref 3.6–5.5)
RBC # BLD AUTO: 5.32 M/UL (ref 4.7–6.1)
SODIUM SERPL-SCNC: 124 MMOL/L (ref 135–145)
SODIUM SERPL-SCNC: 124 MMOL/L (ref 135–145)
SODIUM SERPL-SCNC: 129 MMOL/L (ref 135–145)
SODIUM SERPL-SCNC: 130 MMOL/L (ref 135–145)
WBC # BLD AUTO: 11.5 K/UL (ref 4.8–10.8)

## 2020-04-21 PROCEDURE — 85025 COMPLETE CBC W/AUTO DIFF WBC: CPT

## 2020-04-21 PROCEDURE — 700102 HCHG RX REV CODE 250 W/ 637 OVERRIDE(OP): Performed by: INTERNAL MEDICINE

## 2020-04-21 PROCEDURE — 700111 HCHG RX REV CODE 636 W/ 250 OVERRIDE (IP): Performed by: INTERNAL MEDICINE

## 2020-04-21 PROCEDURE — A9270 NON-COVERED ITEM OR SERVICE: HCPCS | Performed by: INTERNAL MEDICINE

## 2020-04-21 PROCEDURE — A9270 NON-COVERED ITEM OR SERVICE: HCPCS | Performed by: PSYCHIATRY & NEUROLOGY

## 2020-04-21 PROCEDURE — 700102 HCHG RX REV CODE 250 W/ 637 OVERRIDE(OP): Performed by: STUDENT IN AN ORGANIZED HEALTH CARE EDUCATION/TRAINING PROGRAM

## 2020-04-21 PROCEDURE — 83735 ASSAY OF MAGNESIUM: CPT

## 2020-04-21 PROCEDURE — 99291 CRITICAL CARE FIRST HOUR: CPT | Performed by: PSYCHIATRY & NEUROLOGY

## 2020-04-21 PROCEDURE — 770022 HCHG ROOM/CARE - ICU (200)

## 2020-04-21 PROCEDURE — 80048 BASIC METABOLIC PNL TOTAL CA: CPT | Mod: 91

## 2020-04-21 PROCEDURE — 700102 HCHG RX REV CODE 250 W/ 637 OVERRIDE(OP): Performed by: PSYCHIATRY & NEUROLOGY

## 2020-04-21 PROCEDURE — A9270 NON-COVERED ITEM OR SERVICE: HCPCS | Performed by: STUDENT IN AN ORGANIZED HEALTH CARE EDUCATION/TRAINING PROGRAM

## 2020-04-21 PROCEDURE — 700101 HCHG RX REV CODE 250: Performed by: INTERNAL MEDICINE

## 2020-04-21 PROCEDURE — 700105 HCHG RX REV CODE 258: Performed by: INTERNAL MEDICINE

## 2020-04-21 PROCEDURE — 84100 ASSAY OF PHOSPHORUS: CPT

## 2020-04-21 RX ORDER — CARBOXYMETHYLCELLULOSE SODIUM 5 MG/ML
1-2 SOLUTION/ DROPS OPHTHALMIC PRN
Status: DISCONTINUED | OUTPATIENT
Start: 2020-04-21 | End: 2020-04-28 | Stop reason: HOSPADM

## 2020-04-21 RX ORDER — POTASSIUM CHLORIDE 20 MEQ/1
40 TABLET, EXTENDED RELEASE ORAL ONCE
Status: COMPLETED | OUTPATIENT
Start: 2020-04-21 | End: 2020-04-21

## 2020-04-21 RX ORDER — SODIUM CHLORIDE 1 G/1
2 TABLET ORAL
Status: DISCONTINUED | OUTPATIENT
Start: 2020-04-21 | End: 2020-04-22

## 2020-04-21 RX ORDER — AMLODIPINE BESYLATE 5 MG/1
10 TABLET ORAL
Status: DISCONTINUED | OUTPATIENT
Start: 2020-04-21 | End: 2020-04-28 | Stop reason: HOSPADM

## 2020-04-21 RX ORDER — ECHINACEA PURPUREA EXTRACT 125 MG
2 TABLET ORAL
Status: DISCONTINUED | OUTPATIENT
Start: 2020-04-21 | End: 2020-04-28 | Stop reason: HOSPADM

## 2020-04-21 RX ADMIN — HYDRALAZINE HYDROCHLORIDE 25 MG: 25 TABLET, FILM COATED ORAL at 08:22

## 2020-04-21 RX ADMIN — CARVEDILOL 12.5 MG: 6.25 TABLET, FILM COATED ORAL at 18:18

## 2020-04-21 RX ADMIN — SODIUM CHLORIDE 5 MG/HR: 9 INJECTION, SOLUTION INTRAVENOUS at 23:52

## 2020-04-21 RX ADMIN — Medication 2 G: at 08:21

## 2020-04-21 RX ADMIN — LISINOPRIL 20 MG: 20 TABLET ORAL at 06:05

## 2020-04-21 RX ADMIN — LABETALOL HYDROCHLORIDE 10 MG: 5 INJECTION, SOLUTION INTRAVENOUS at 01:33

## 2020-04-21 RX ADMIN — POTASSIUM CHLORIDE 40 MEQ: 1500 TABLET, EXTENDED RELEASE ORAL at 15:46

## 2020-04-21 RX ADMIN — SODIUM CHLORIDE 7.5 MG/HR: 9 INJECTION, SOLUTION INTRAVENOUS at 19:57

## 2020-04-21 RX ADMIN — HYDROXYZINE HYDROCHLORIDE 50 MG: 50 TABLET, FILM COATED ORAL at 03:33

## 2020-04-21 RX ADMIN — ACETAMINOPHEN 650 MG: 325 TABLET, FILM COATED ORAL at 02:21

## 2020-04-21 RX ADMIN — Medication 2 G: at 12:35

## 2020-04-21 RX ADMIN — CARBOXYMETHYLCELLULOSE SODIUM 2 DROP: 5 SOLUTION/ DROPS OPHTHALMIC at 15:53

## 2020-04-21 RX ADMIN — CARBOXYMETHYLCELLULOSE SODIUM 2 DROP: 5 SOLUTION/ DROPS OPHTHALMIC at 12:48

## 2020-04-21 RX ADMIN — ENALAPRILAT 0.62 MG: 1.25 INJECTION INTRAVENOUS at 00:37

## 2020-04-21 RX ADMIN — ACETAMINOPHEN 650 MG: 325 TABLET, FILM COATED ORAL at 08:28

## 2020-04-21 RX ADMIN — HYDRALAZINE HYDROCHLORIDE 25 MG: 25 TABLET, FILM COATED ORAL at 14:08

## 2020-04-21 RX ADMIN — ACETAMINOPHEN 650 MG: 325 TABLET, FILM COATED ORAL at 14:08

## 2020-04-21 RX ADMIN — SODIUM CHLORIDE 5 MG/HR: 9 INJECTION, SOLUTION INTRAVENOUS at 03:32

## 2020-04-21 RX ADMIN — HYDRALAZINE HYDROCHLORIDE 25 MG: 25 TABLET, FILM COATED ORAL at 22:15

## 2020-04-21 RX ADMIN — SENNOSIDES AND DOCUSATE SODIUM 2 TABLET: 8.6; 5 TABLET ORAL at 06:05

## 2020-04-21 RX ADMIN — ANTACID TABLETS 1000 MG: 500 TABLET, CHEWABLE ORAL at 02:21

## 2020-04-21 RX ADMIN — SODIUM CHLORIDE: 3 INJECTION, SOLUTION INTRAVENOUS at 13:36

## 2020-04-21 RX ADMIN — HYDRALAZINE HYDROCHLORIDE 20 MG: 20 INJECTION INTRAMUSCULAR; INTRAVENOUS at 02:32

## 2020-04-21 RX ADMIN — ACETAMINOPHEN 650 MG: 325 TABLET, FILM COATED ORAL at 20:26

## 2020-04-21 RX ADMIN — AMLODIPINE BESYLATE 10 MG: 10 TABLET ORAL at 10:30

## 2020-04-21 RX ADMIN — Medication 2 G: at 18:18

## 2020-04-21 RX ADMIN — POTASSIUM BICARBONATE 25 MEQ: 978 TABLET, EFFERVESCENT ORAL at 08:21

## 2020-04-21 RX ADMIN — CARVEDILOL 12.5 MG: 6.25 TABLET, FILM COATED ORAL at 08:21

## 2020-04-21 RX ADMIN — CARBOXYMETHYLCELLULOSE SODIUM 1 DROP: 5 SOLUTION/ DROPS OPHTHALMIC at 22:18

## 2020-04-21 ASSESSMENT — ENCOUNTER SYMPTOMS
SEIZURES: 0
FOCAL WEAKNESS: 0
HEMOPTYSIS: 0
SINUS PAIN: 0
DIARRHEA: 0
HALLUCINATIONS: 0
BRUISES/BLEEDS EASILY: 0
NAUSEA: 0
FEVER: 0
SENSORY CHANGE: 0
BLURRED VISION: 0
DOUBLE VISION: 0
DEPRESSION: 0
HEADACHES: 1
SPEECH CHANGE: 0
MYALGIAS: 0
SHORTNESS OF BREATH: 0
POLYDIPSIA: 0
DIZZINESS: 0
PHOTOPHOBIA: 0
EYE PAIN: 0
NECK PAIN: 0
COUGH: 0
PALPITATIONS: 0
FALLS: 0
BACK PAIN: 0
NERVOUS/ANXIOUS: 0
WEAKNESS: 0
VOMITING: 0
CHILLS: 0

## 2020-04-21 NOTE — PROGRESS NOTES
Patient arrived to R104. Patient is in stable condition A&O x 4. Patient able to transfer from one bed to another with min assist. BP >160 HR in 70 SR. No working IV site. 2 Rns assisting in placing a peripheral line.     Intensivist also at bedside. MD stated that a central line is not needed at this time. Stated to control blood pressure with PRNs first and if not effective then start nicardipine. Once there is a working Iv will start 3 % hypertonic solution . comfort measures in place.

## 2020-04-21 NOTE — PROGRESS NOTES
Monitor summary: SB-SR 59-68, WI 0.20, QRS 0.08, QT 0.40, with rare PVCs per strip from monitor room.

## 2020-04-21 NOTE — PROGRESS NOTES
Spoke on the phone with [facility] resident MD Hooker regarding sodium level of 124. MD reported to keep at same level of 30ml/hr and to recheck at 0500.

## 2020-04-21 NOTE — PROGRESS NOTES
ICU Transfer  Consulted by primary team to consider ICU transfer for this patient with SAH who is experiencing high urine output, worsening hyponatremia, and persistent hypertension concerning for cerebral salt wasting. Salt tabs were unsuccessful today and sodium continued to trend downward. NSG team recommended transfer to ICU for hypertonic saline, with goal Na 135-145.   Persistent hypertension in 170s despite prn labetalol. Will restart nicardipine gtt given risk of cerebral edema. Goal SBP <160.  Patient is assigned to R104.

## 2020-04-21 NOTE — DISCHARGE PLANNING
Hospital Care Management Team Assessment    In the case of an emergency, pt's NOK is Nestor Rae (Spouse) [phone].     This RNCM spoke with Nestor, Pt's spouse, via phone and obtained the information used in this assessment. Nestor verified accuracy of facesheet.     Pt lives in a one story house with spouse and two daughters (ages 12 and 16). Pt uses the [facility] pharmacy in [place]. Prior to current hospitalization, pt was completely independent with ADLS/IADLS. Pt drives his vehicle to and from doctors appointments. Pt works full time. Pt has prescription drug coverage through [employer]. Pt has no history of SA or MH. Pt has support from his spouse. Pt started a living will at home (in [place]) but it had not been notarized or signed yet. Pt's discharge plan is home when medically cleared.      Nestor requesting a medical update from the Pt's provider. RNCM asked Dr. Barker to call her, via [employer] Text      Information Source  Orientation : Oriented x 4  Information Given By: Spouse  Informant's Name: Nestor Rae  Who is responsible for making decisions for patient? : Patient    Readmission Evaluation  Is this a readmission?: No    Elopement Risk  Legal Hold: No  Ambulatory or Self Mobile in Wheelchair: Yes  Disoriented: No  Psychiatric Symptoms: None  History of Wandering: No  Elopement this Admit: No  Vocalizing Wanting to Leave: No  Displays Behaviors, Body Language Wanting to Leave: No-Not at Risk for Elopement  Elopement Risk: Not at Risk for Elopement    Interdisciplinary Discharge Planning  Primary Care Physician: Dr. Tommy Jaimes [facility] [phone]  Lives with - Patient's Self Care Capacity: Spouse, Child Less than 18 Years of Age  Patient or legal guardian wants to designate a caregiver (see row info): No  Support Systems: Spouse / Significant Other  Housing / Facility: 1 Story House  Prior Services: None  Durable Medical Equipment: Not Applicable    Discharge Preparedness  What is  your plan after discharge?: Home with help  What are your discharge supports?: Spouse  Prior Functional Level: Ambulatory, Drives Self, Independent with Activities of Daily Living, Independent with Medication Management  Difficulity with ADLs: None  Difficulity with IADLs: None    Functional Assesment  Prior Functional Level: Ambulatory, Drives Self, Independent with Activities of Daily Living, Independent with Medication Management    Finances  Financial Barriers to Discharge: No  Prescription Coverage: Yes    Vision / Hearing Impairment  Vision Impairment : No  Hearing Impairment : No    Advance Directive  Advance Directive?: None(started a living will in [place], not notarized)    Domestic Abuse  Have you ever been the victim of abuse or violence?: No  Physical Abuse or Sexual Abuse: No  Verbal Abuse or Emotional Abuse: No  Possible Abuse Reported to:: Not Applicable    Psychological Assessment  History of Substance Abuse: None  History of Psychiatric Problems: No  Non-compliant with Treatment: No  Newly Diagnosed Illness: Yes    Anticipated Discharge Information  Anticipated discharge disposition: Home  Discharge Address: [address] [place] [id number]  Discharge Contact Phone Number: [phone]

## 2020-04-21 NOTE — PROGRESS NOTES
Neurosurgery Progress Note    Subjective:  Patient presented with SAH with severe headache  Speaking with patient he has HTN and was in a verbal confrontation over the phone when his symptoms presented.     No aneurysm seen during IR procedure.     No new symptoms overnight.  Was transferred back to ICU on  for hypertonic saline treatment for cerebral Na wasting.  Na 124 this AM.  More mild headache now that is stable and controlled with Tylenol.  Still hypertensive despite multiple medications    Renal Artery US appears to be normal    Exam:  GCS 15  No drift, grossly strong  Appropriate   EOM intact, pupils equal   Very anxious     BP  Min: 105/49  Max: 193/85  Pulse  Av.9  Min: 56  Max: 75  Resp  Av.4  Min: 14  Max: 37  Temp  Av.1 °C (98.8 °F)  Min: 36.8 °C (98.2 °F)  Max: 37.5 °C (99.5 °F)  SpO2  Av %  Min: 90 %  Max: 98 %    No data recorded    Recent Labs     20  0507 20  0450   WBC 12.1* 11.5*   RBC 5.40 5.32   HEMOGLOBIN 14.5 14.4   HEMATOCRIT 43.0 40.6*   MCV 79.6* 76.3*   MCH 26.9* 27.1   MCHC 33.7 35.5*   RDW 39.2 36.2   PLATELETCT 289 248   MPV 9.3 8.9*     Recent Labs     20  1733 20  0000 20  0640   SODIUM 123* 124* 124*   POTASSIUM 3.5* 3.7 3.7   CHLORIDE 86* 87* 90*   CO2    GLUCOSE 148* 115* 114*   BUN    CREATININE 0.72 0.70 0.69   CALCIUM 8.7 8.8 8.7               Intake/Output       20 07 - 20 0659 20 07 - 20 0659       Total  Total       Intake    P.O.  --  270 270  --  -- --    P.O. -- 270 270 -- -- --    I.V.  --  307.3 307.3  --  -- --    Cardene Volume -- 23.3 23.3 -- -- --    Volume (mL) (3% sodium chloride (HYPERTONIC SALINE) 500mL infusion) -- 284 284 -- -- --    Total Intake -- 577.3 577.3 -- -- --       Output    Urine  900  400 1300  200  -- 200    Number of Times Voided 4 x 3 x 7 x -- -- --    Urine Void (mL)  200 -- 200    Total Output   200 -- 200       Net I/O     -900 177.3 -722.7 -200 -- -200            Intake/Output Summary (Last 24 hours) at 4/21/2020 0808  Last data filed at 4/21/2020 0700  Gross per 24 hour   Intake 577.33 ml   Output 1200 ml   Net -622.67 ml            • MD Alert...Adult ICU Electrolyte Replacement per Pharmacy   PHARMACY TO DOSE   • sodium chloride  2 g TID WITH MEALS   • potassium bicarbonate  25 mEq Once   • lisinopril  20 mg Q DAY   • carvedilol  12.5 mg BID WITH MEALS   • hydrOXYzine HCl  50 mg TID PRN   • 3% sodium chloride   Continuous   • niCARdipine infusion  0-15 mg/hr Continuous   • hydrALAZINE  10-20 mg Q6HRS PRN   • labetalol  10 mg Q6HRS PRN   • enalaprilat  0.625 mg Q6HRS PRN   • hydrALAZINE  25 mg Q8HRS   • fluticasone  2 Spray DAILY   • calcium carbonate  1,000 mg Q6HRS PRN   • hyoscyamine-maalox plus-lidocaine viscous  30 mL Once PRN   • senna-docusate  2 Tab BID    And   • polyethylene glycol/lytes  1 Packet QDAY PRN    And   • magnesium hydroxide  30 mL QDAY PRN    And   • bisacodyl  10 mg QDAY PRN   • acetaminophen  650 mg Q6HRS PRN   • Respiratory Therapy Consult   Continuous RT   • ondansetron  4 mg Q4HRS PRN    Or   • ondansetron  4 mg Q4HRS PRN       Assessment and Plan:  Hospital day #6  Appreciate hospitalist/Intensivist care.  Q 4hr neuro checks  PT/OT  SBP to be <160mmHg  Na goals of 135-145  Xanax prn for anxiety  Pt needs repeat CTA in 6 weeks  He is a [facility] patient in the [place] area and wishes to do all f/u there   Answered all questions  Will continue to follow       Prophylactic anticoagulation: no         Start date/time:

## 2020-04-21 NOTE — PROGRESS NOTES
2 RN Skin Check    2 RN skin check complete.   Devices in place: pulse ox, bp cuff.  Skin assessed under devices: yes intact  Confirmed pressure ulcers found on:none   New potential pressure ulcers noted on none  The following interventions in place. Patient able to turn and reposition on his own.

## 2020-04-21 NOTE — PROGRESS NOTES
Patient is a higher level of care.  Assume care of patient and bedside report received from ELISABET Gunn.      Patient will be transferring to RICU.

## 2020-04-21 NOTE — PROGRESS NOTES
[facility] ICU Progress Note      Admit Date: 4/16/2020    Resident(s): Carmen Barker M.D.   Attending:  JOHN HOFFMAN/ Dr. Sorto    Patient ID:    Name:  J Luis Rae     YOB: 1960  Age:  60 y.o.  male   MRN:  [id number]    Hospital Course (carried forward and updated):  J Luis Rae is a 60 y.o. male with PMHx of HTN, initially transferred from [facility] 4/16/2020 with subarachnoid hemorrhage and hypertensive emergency.  Patient was admitted to ICU on nicardipine drip, new body pain and Vasotec.  He underwent cerebral angiogram did not show any evidence of aneurysm.  Was seen by neurosurgery, Dr. Randolph who recommended conservative management.  Patient was transferred to the floor on 4/18/2020 in order to start him on appropriate antihypertensive regimen for home, with goal SBP less than 160.  On the floor, off the nicardipine drip, patient demonstrated resistant hypertension with systolic blood pressure in 160s to 170s, while on hydrochlorothiazide, lisinopril, amlodipine and scheduled hydralazine.  Plasma renin and aldosterone levels were drawn and are currently pending.  Renal duplex ultrasound did not show any renal artery stenosis.  Patient sodium level dropped from 135->127.  He had received 1 dose of Lasix in the ICU and was on hydrochlorothiazide for 1 day.  Hydrochlorothiazide was discontinued in view of hyponatremia.  Neurosurgery was updated about patient's sodium levels and recommended starting patient on salt tablets 3 g/day with every 6 hours monitoring of sodium.  Patient's sodium continued to drop to 123.   Discussed with Neurosurgery PA, Daphne Magallanes, about drop in sodium from 127->123.  This update was discussed by neurosurgery PA with Dr. Randolph, who recommended transferring patient to the ICU for initiation of hypertonic saline protocol for concern of cerebral salt wasting.     Consultants:  Critical Care  Neurosurgery      Interval Events:  Critical care team  "was re-consulted by primary team to consider ICU transfer for management of cerebral salt wasting with worsening hyponatremia and persistent hypertension. Hypertonic saline and nicardipine gtt were started.  Very little improvement in Na on hypertonic saline at 30/h, will increase to 40/h. Increase salt tabs to 2g TID w meals. No free water.    Good mentation, AAOx4.   C/o dry eyes and nasal passages this am. Does not appear dehydrated. Ordered saline eyedrops, nasal spray.    Review of Systems   Constitutional: Negative for chills and fever.   HENT: Negative for congestion and nosebleeds.         Nasal dryness   Eyes: Negative for blurred vision and double vision.        Dry eyes   Respiratory: Negative for cough and shortness of breath.    Cardiovascular: Negative for chest pain and palpitations.   Gastrointestinal: Negative for nausea and vomiting.   Genitourinary: Negative for dysuria and urgency.   Musculoskeletal: Negative for falls and myalgias.   Skin: Negative for itching and rash.   Neurological: Negative for dizziness and weakness.   Endo/Heme/Allergies: Positive for environmental allergies. Negative for polydipsia.   Psychiatric/Behavioral: Negative for depression and hallucinations. The patient is not nervous/anxious.        PHYSICAL EXAM:  Vitals:    04/21/20 0700 04/21/20 0800 04/21/20 0815 04/21/20 0830   BP: 141/71  (!) 169/82 (!) 200/96   Pulse: 79 69 68 71   Resp: (!) 33 20 18 20   Temp:       TempSrc:       SpO2: 93% 95% 96% 94%   Weight:       Height:        Body mass index is 32.18 kg/m².  Latest Vitals:  BP (!) 200/96   Pulse 71   Temp 37.1 °C (98.7 °F) (Tympanic)   Resp 20   Ht 1.93 m (6' 4\")   Wt 119.9 kg (264 lb 5.3 oz)   SpO2 94%   BMI 32.18 kg/m²   O2 therapy: Pulse Oximetry: 94 %, O2 (LPM): 0, O2 Delivery Device: None - Room Air  Vitals Range last 24h:  Temp:  [36.8 °C (98.2 °F)-37.5 °C (99.5 °F)] 37.1 °C (98.7 °F)  Pulse:  [56-79] 71  Resp:  [14-37] 20  BP: (105-200)/(49-99) " 200/96  SpO2:  [90 %-98 %] 94 %  Date 04/21/20 0700 - 04/22/20 0659   Shift 1848-6692 9319-8460 3090-2707 24 Hour Total   INTAKE   Shift Total       OUTPUT   Urine 200   200     Urine Void (mL) 200   200   Shift Total 200   200   NET -200   -200        Intake/Output Summary (Last 24 hours) at 4/21/2020 0918  Last data filed at 4/21/2020 0700  Gross per 24 hour   Intake 577.33 ml   Output 1200 ml   Net -622.67 ml        Physical Exam   Constitutional: He is oriented to person, place, and time and well-developed, well-nourished, and in no distress.   obese   HENT:   Head: Normocephalic and atraumatic.   Eyes: Pupils are equal, round, and reactive to light. EOM are normal. No scleral icterus.   Neck: Normal range of motion. Neck supple.   Cardiovascular: Normal rate and regular rhythm.   Pulmonary/Chest: Effort normal and breath sounds normal. No respiratory distress. He has no wheezes. He has no rales.   Abdominal: Soft. There is no abdominal tenderness.   Musculoskeletal: Normal range of motion.         General: No tenderness.   Neurological: He is alert and oriented to person, place, and time.   Skin: Skin is warm and dry.   Psychiatric: Mood and affect normal.   Nursing note and vitals reviewed.          Recent Labs     04/20/20  1500 04/20/20  1733 04/21/20  0000 04/21/20  0640   SODIUM 123* 123* 124* 124*   POTASSIUM 3.8 3.5* 3.7 3.7   CHLORIDE 86* 86* 87* 90*   CO2 23 21 21 20   BUN 19 20 20 20   CREATININE 0.79 0.72 0.70 0.69   MAGNESIUM 2.0  --   --  2.0   PHOSPHORUS  --   --   --  3.0   CALCIUM 8.9 8.7 8.8 8.7     Recent Labs     04/20/20  1733 04/21/20  0000 04/21/20  0640   GLUCOSE 148* 115* 114*     Recent Labs     04/19/20  0910 04/20/20  0507 04/21/20  0450   RBC  --  5.40 5.32   HEMOGLOBIN  --  14.5 14.4   HEMATOCRIT  --  43.0 40.6*   PLATELETCT  --  289 248   IRON  --  30*  --    FERRITIN 530.0*  --   --    [facility]  --  206*  --      Recent Labs     04/20/20  0507 04/21/20  0450   WBC 12.1* 11.5*    NEUTSPOLYS  --  72.40*   LYMPHOCYTES  --  10.70*   MONOCYTES  --  15.70*   EOSINOPHILS  --  0.10   BASOPHILS  --  0.30       Meds:  • MD Alert...Adult ICU Electrolyte Replacement per Pharmacy       • sodium chloride  2 g     • lisinopril  20 mg     • carvedilol  12.5 mg     • hydrOXYzine HCl  50 mg     • 3% sodium chloride   40 mL/hr at 04/21/20 0900   • niCARdipine infusion  0-15 mg/hr 7.5 mg/hr (04/21/20 0902)   • hydrALAZINE  10-20 mg     • labetalol  10 mg     • enalaprilat  0.625 mg     • hydrALAZINE  25 mg     • fluticasone  2 Spray     • calcium carbonate  1,000 mg     • hyoscyamine-maalox plus-lidocaine viscous  30 mL     • senna-docusate  2 Tab      And   • polyethylene glycol/lytes  1 Packet      And   • magnesium hydroxide  30 mL      And   • bisacodyl  10 mg     • acetaminophen  650 mg     • Respiratory Therapy Consult       • ondansetron  4 mg      Or   • ondansetron  4 mg          Procedures:  4/17/2020 Cerebral angiogram    Imaging:  US-RENAL ARTERY DUPLEX COMP   Final Result      IR-CAROTID-CEREBRAL BILATERAL   Final Result         1.  Normal cerebral arteriogram. No evidence of aneurysm.   2.  The origin of the left posterior inferior cerebellar artery is cut off on the lateral view and largely obscured on the AP view due to overlapping structures. This was not noticed during the procedure, only at the time of final interpretation.    Attention to this location on follow-up CTA or MRA is recommended.      CT-CTA NECK WITH & W/O-POST PROCESSING   Final Result      Mild carotid and vertebral artery atherosclerosis without significant stenosis, aneurysm or occlusion      Mild bronchial wall thickening and groundglass could indicate edema. Reactive airways disease/infection is not excluded      CT-CTA HEAD WITH & W/O-POST PROCESS   Final Result      CT angiogram of the [place] is unable to identify source of subarachnoid hemorrhage      Slight new visualization of acute subarachnoid  hemorrhage in the lateral ventricles and there is slight temporal lobe dilatation. Attention in follow-up is advised      Otherwise stable moderate acute subarachnoid hemorrhage filling the basal cisterns as above      OUTSIDE IMAGES-CT HEAD   Final Result          Problem and Plan:  * Hyponatremia  Assessment & Plan  2/2 cerebral salt wasting syndrome. Stabilized but little improvement on hypertonic saline.  -Continue salt tabs 2g TID  -Increase hypertonic saline rate  -Free water restriction  -NSG following, appreciate assistance    Hypertensive emergency- (present on admission)  Assessment & Plan  The patient initially presented with a BP of 188/135. This may be contributing to SAH.    continues to have high blood pressures in the 170s to 180s systolic.  Renal artery duplex was negative for renal artery stenosis.  Patient also has anxiety, and is unable to sleep at night.  This could potentially be attribute to his high blood pressure.  However he does not want to take any narcotics, sedatives.  He also has a history of 2-3 drinks, hard liquor every day, it is possible he is withdrawing mildly.    Plan:  - Continue oral antihypertensives, restart amlodipine  - Continue nicardipine gtt for goal SBP<160 per neurosurgery  - Follow plasma renin, aldosterone  - Close blood pressure monitoring    Non-aneurysmal perimesencephalic subarachnoid hemorrhage (HCC)- (present on admission)  Assessment & Plan  Presented on 2020 with neck stiffness, headaches, and nausea/vomiting. Non-con CT showed a SAH. Subsequent CTA did not show an aneurysm or source of SAH (although the patient does have a family history of aneurysms). He does have a history of hypertension, but is not on any anti-hypertensive medications at home. He is on aspirin but no other anti-coagulation.  Cerebral Angiography: negative for aneurysm  C/b cerebral salt wasting syndrome.  [employer] [facility]  No PT OT needs identified.  - Elevate head of  bed.  - fall/seizure/aspiration precautions  - Hold aspirin   - q4h neuro-checks  - SBP <160 per neurosurgery  - Neurosurgery following, appreciate recommendations  - Repeat CTA in 6 weeks, patient will follow in [place] with [facility]    Hypokalemia  Assessment & Plan  Monitor and replete prn    Acute respiratory insufficiency  Assessment & Plan  Resolved.  Was + 6L since admit, s/p Lasix 20mg IV.  4/19-20 Satting well on RA    Alcohol abuse, daily use- (present on admission)  Assessment & Plan  The patient drinks alcohol daily (usually hard liquor, 2 to 3 glasses) and uses marijuana occasionally.   Does not appear to be withdrawing  - Continue to monitor for withdrawal.   - Currently not on CIWA protocol  - cessation counseling and education provided    At risk for obstructive sleep apnea- (present on admission)  Assessment & Plan  STOP BANG score 7/8.  -Outpatient sleep study    Hypomagnesemia  Assessment & Plan  Monitor and replete prn    Pre-diabetes- (present on admission)  Assessment & Plan  A1c was 6.3.   - Continue SSI and hypoglycemia protocol  - Consider lifestyle modification counseling prior to discharge    Hyperlipidemia  Assessment & Plan  The patient's LDL is elevated at 143 and total cholesterol is 209.   - Start statin when clinically appropriate      DISPO: continue ICU    CODE STATUS: FULL    Quality Measures:  Feeding: regular diet, no free water  Analgesia: prn acetaminophen  Sedation: prn hydroxyzine   Thromboprophylaxis: SCDs, pharmacologic ppx contraindicated 2/2 intracranial bleeding  Head of bed: >30 degrees  Ulcer prophylaxis: n/a  Glycemic control: SSI  Bowel care: bowel regimen  Indwelling lines: PIVs  Deescalation of antibiotics: n/a      Carmen Barker M.D.

## 2020-04-21 NOTE — PROGRESS NOTES
Critical Care Progress Note    Date of admission  4/16/2020    Chief Complaint  60 y.o. male admitted 4/16/2020 with spontaneous SAH    Hospital Course    Mr. Rae is a pleasant 60 year old male with the past medical history of hypertension who developed sudden onset of neck stiffness and posterior severe HA with nausea and vomiting around 5 pm on 4/16.  He was seen at [facility] where a non-contrast CT head revealed a SAH.  He was transferred to [facility] and underwent CTA head/neck which did not show an aneurysm or AVM.  He was started on nicardipine drip for SBP goals <140.      Interval Problem Update  Reviewed last 24 hour events:  - Tx back to ICU for probable cerebral salt wasting 2/2 SAH  - HA tylenol   - Neuro: GCS 15   - HR: 60-70s   - SBP: 140-170s   - GI: Advance diet as tolerated   - UOP: 1.3L   - + BM this am   - Tm: 37.5   - Lines: PIVs   - PPx: GI not indicated, DVT SCDs   - Na 124   - Current titration of 3%   - adjusting oral hypertensives    4/20;  Neurosurgery requesting transfer back to ICU  Na significantly reduced last 24 hours from 1 35 -> 127 and then this afternoon 123  Neurologically unchanged and remains GCS 15 w/ no new focal symptoms  Denies nausea and vomiting but still has headache  Blood pressure remains poorly controlled in the 150-1 80 range  I do note he was on nicardipine drip while previously in the ICU and this is been reordered  Additionally, nimodipine has been discontinued and lisinopril and Coreg ordered  Taking p.o., while I have been seeing him repeatedly requesting water  Afebrile, T-max 99.6  Remains on room air with excellent saturations  SCDs for prophylaxis    4/18 notes from Dr Mallory   - no events overnight   - a/ox4   - nicardipine drip at 15-->7.5   - no neuro deficits   - slight nausea   - voiding in urinal   - SR 60-80   - -150s   - afebrile   - O2 at 4 lpm   - pt is 6.5 liters fluid positive   - no imaging   - Mg 1.7   - k 3.4    Review of  Systems  Review of Systems   Constitutional: Negative for chills, fever and malaise/fatigue.   HENT: Negative for congestion, ear pain and sinus pain.    Eyes: Negative for blurred vision, double vision, photophobia and pain.   Respiratory: Negative for cough, hemoptysis and shortness of breath.    Cardiovascular: Negative for chest pain, palpitations and leg swelling.   Gastrointestinal: Negative for diarrhea, nausea and vomiting.   Genitourinary: Negative for frequency, hematuria and urgency.   Musculoskeletal: Negative for back pain and neck pain.   Skin: Negative for rash.   Neurological: Positive for headaches. Negative for sensory change, speech change, focal weakness, seizures and weakness.   Endo/Heme/Allergies: Does not bruise/bleed easily.   Psychiatric/Behavioral: Negative for depression. The patient is not nervous/anxious.    Sleep: Negative for EDS, snoring and apnea    Vital Signs for last 24 hours   Temp:  [36.8 °C (98.2 °F)-37.5 °C (99.5 °F)] 37.1 °C (98.7 °F)  Pulse:  [56-75] 69  Resp:  [14-37] 23  BP: (105-193)/(49-99) 142/75  SpO2:  [90 %-98 %] 93 %    Hemodynamic parameters for last 24 hours       Respiratory Information for the last 24 hours       Physical Exam   Physical Exam  Vitals signs and nursing note reviewed.   Constitutional:       General: He is not in acute distress.     Appearance: Normal appearance. He is obese. He is not ill-appearing or toxic-appearing.   HENT:      Head: Normocephalic and atraumatic.      Right Ear: External ear normal.      Left Ear: External ear normal.      Nose: Nose normal. No rhinorrhea.      Mouth/Throat:      Mouth: Mucous membranes are moist.      Pharynx: Oropharynx is clear. No oropharyngeal exudate.   Eyes:      General: No scleral icterus.     Extraocular Movements: Extraocular movements intact.      Conjunctiva/sclera: Conjunctivae normal.      Pupils: Pupils are equal, round, and reactive to light.   Neck:      Musculoskeletal: Normal range of  motion.   Cardiovascular:      Rate and Rhythm: Normal rate and regular rhythm.      Pulses: Normal pulses.   Pulmonary:      Effort: Pulmonary effort is normal. No respiratory distress.      Breath sounds: Normal breath sounds. No wheezing or rhonchi.   Chest:      Chest wall: No tenderness.   Abdominal:      General: Abdomen is flat. Bowel sounds are normal. There is no distension.      Palpations: Abdomen is soft.      Tenderness: There is no abdominal tenderness. There is no right CVA tenderness, left CVA tenderness, guarding or rebound.   Musculoskeletal: Normal range of motion.         General: No swelling.      Right lower leg: No edema.      Left lower leg: No edema.   Skin:     General: Skin is warm and dry.      Capillary Refill: Capillary refill takes less than 2 seconds.      Coloration: Skin is not cyanotic.      Findings: No rash.      Nails: There is no clubbing.     Neurological:      General: No focal deficit present.      Mental Status: He is alert and oriented to person, place, and time.      GCS: GCS eye subscore is 4. GCS verbal subscore is 5. GCS motor subscore is 6.      Cranial Nerves: No cranial nerve deficit.      Sensory: No sensory deficit.      Motor: Motor function is intact. No weakness.      Coordination: Coordination is intact.      Comments: GCS 15. Full strength throughout. No ataxia   Psychiatric:         Mood and Affect: Mood normal.         Speech: Speech normal.         Behavior: Behavior normal.         Thought Content: Thought content normal.         Judgment: Judgment normal.         Medications  Current Facility-Administered Medications   Medication Dose Route Frequency Provider Last Rate Last Dose   • lisinopril (PRINIVIL) tablet 20 mg  20 mg Oral Q DAY Igor Macias M.D.   20 mg at 04/21/20 0605   • carvedilol (COREG) tablet 12.5 mg  12.5 mg Oral BID WITH MEALS Igor Macias M.D.   12.5 mg at 04/20/20 1641   • hydrOXYzine HCl (ATARAX) tablet 50 mg  50 mg Oral TID PRN  Rhiannon Araya M.D.   50 mg at 04/21/20 0333   • sodium chloride (SALT) tablet 1 g  1 g Oral TID WITH MEALS Rhiannon Araya M.D.   1 g at 04/20/20 1641   • 3% sodium chloride (HYPERTONIC SALINE) 500mL infusion   Intravenous Continuous Carmen Barker M.D. 30 mL/hr at 04/20/20 2032 30 mL at 04/20/20 2032   • niCARdipine (CARDENE) 25 mg in  mL Infusion  0-15 mg/hr Intravenous Continuous Carmen Barker M.D.   Stopped at 04/21/20 0430   • hydrALAZINE (APRESOLINE) injection 10-20 mg  10-20 mg Intravenous Q6HRS PRN Ezra Alvarado M.D.   20 mg at 04/21/20 0232   • labetalol (NORMODYNE/TRANDATE) injection 10 mg  10 mg Intravenous Q6HRS PRN Ezra Alvarado M.D.   10 mg at 04/21/20 0133   • enalaprilat (VASOTEC) injection 0.625 mg  0.625 mg Intravenous Q6HRS PRN Ezra Alvarado M.D.   0.625 mg at 04/21/20 0037   • hydrALAZINE (APRESOLINE) tablet 25 mg  25 mg Oral Q8HRS Rhiannon Araya M.D.   Stopped at 04/20/20 2200   • fluticasone (FLONASE) nasal spray 100 mcg  2 Spray Nasal DAILY Dedra Mallory M.D.   100 mcg at 04/19/20 0536   • calcium carbonate (TUMS) chewable tab 1,000 mg  1,000 mg Oral Q6HRS PRN Rudy Adams M.D.   1,000 mg at 04/21/20 0221   • hyoscyamine-maalox plus-lidocaine viscous (GI COCKTAIL) oral susp 30 mL  30 mL Oral Once PRN Rudy Adams M.D.       • senna-docusate (PERICOLACE or SENOKOT S) 8.6-50 MG per tablet 2 Tab  2 Tab Oral BID Brandon Fajardo M.D.   2 Tab at 04/21/20 0605    And   • polyethylene glycol/lytes (MIRALAX) PACKET 1 Packet  1 Packet Oral QDAY PRN Brandon Fajardo M.D.        And   • magnesium hydroxide (MILK OF MAGNESIA) suspension 30 mL  30 mL Oral QDAY PRN Brandon Fajardo M.D.        And   • bisacodyl (DULCOLAX) suppository 10 mg  10 mg Rectal QDAY PRN Brandon Fajardo M.D.       • acetaminophen (TYLENOL) tablet 650 mg  650 mg Oral Q6HRS PRN Brandon Fajardo M.D.   650 mg at 04/21/20 0221   • Respiratory Therapy Consult   Nebulization Continuous RT Brandon Fajardo M.D.        • ondansetron (ZOFRAN ODT) dispertab 4 mg  4 mg Oral Q4HRS PRN Brandon Fajardo M.D.        Or   • ondansetron (ZOFRAN) syringe/vial injection 4 mg  4 mg Intravenous Q4HRS PRN Brandon Fajardo M.D.   4 mg at 04/18/20 1443       Fluids    Intake/Output Summary (Last 24 hours) at 4/21/2020 0639  Last data filed at 4/21/2020 0600  Gross per 24 hour   Intake 577.33 ml   Output 1300 ml   Net -722.67 ml       Laboratory          Recent Labs     04/20/20  1500 04/20/20  1733 04/21/20  0000   SODIUM 123* 123* 124*   POTASSIUM 3.8 3.5* 3.7   CHLORIDE 86* 86* 87*   CO2 23 21 21   BUN 19 20 20   CREATININE 0.79 0.72 0.70   MAGNESIUM 2.0  --   --    CALCIUM 8.9 8.7 8.8     Recent Labs     04/20/20  1500 04/20/20  1733 04/21/20  0000   GLUCOSE 126* 148* 115*     Recent Labs     04/20/20  0507 04/21/20  0450   WBC 12.1* 11.5*   NEUTSPOLYS  --  72.40*   LYMPHOCYTES  --  10.70*   MONOCYTES  --  15.70*   EOSINOPHILS  --  0.10   BASOPHILS  --  0.30     Recent Labs     04/19/20  0910 04/20/20  0507 04/21/20  0450   RBC  --  5.40 5.32   HEMOGLOBIN  --  14.5 14.4   HEMATOCRIT  --  43.0 40.6*   PLATELETCT  --  289 248   IRON  --  30*  --    FERRITIN 530.0*  --   --    TOTIRONBC  --  206*  --        Imaging  CTA neck:  IMPRESSION:     Mild carotid and vertebral artery atherosclerosis without significant stenosis, aneurysm or occlusion     Mild bronchial wall thickening and groundglass could indicate edema. Reactive airways disease/infection is not excluded    CTA head:  IMPRESSION:     CT angiogram of the [place] is unable to identify source of subarachnoid hemorrhage     Slight new visualization of acute subarachnoid hemorrhage in the lateral ventricles and there is slight temporal lobe dilatation. Attention in follow-up is advised     Otherwise stable moderate acute subarachnoid hemorrhage filling the basal cisterns as above    Assessment/Plan  * Hyponatremia  Assessment & Plan  Return to ICU for tighter sodium management at  the request neurosurgery 4/20  Low Na secondary to cerebral salt wasting from SAH?  Serial sodium levels, every 6 hour lab  Goal sodium 135-145, increase sodium no greater than 9 mEq in 24 hours  Salt tabs 2g TID  Current titration of  3% saline   Encourage electrolyte oral salt over free H20      Hypertensive emergency- (present on admission)  Assessment & Plan  Remains uncontrolled  Optimize oral antihypertensive regimen; added amlodipine 10mg this am  Nicardipine PRN  Hydralazine and labetalol prn  Coreg 12.5 BID  Hydralazine 25 q8  Lisinopril 20mg daily    Non-aneurysmal perimesencephalic subarachnoid hemorrhage (HCC)- (present on admission)  Assessment & Plan  With no clear aneurysm demonstrated on CTA head/neck on 4/16  CT angio 4/17 without AVM or aneurysm, transferred to neurosurgery floor 4/18  Return to ICU 4/20 sodium and BP control per neurosurgery  Neurochecks every 4 hours, monitor for the need for more frequent  Monitor for the need for follow-up imaging studies, confer with neurosurgery daily  SBP goals <160  Goal sodium 135-145  Euglycemia  Pain control without oversedation  Nimotop stopped and patient transition to ACE inhibitor and beta-blocker while on neurosurgical floor as no indication for nimodipine at present as SAH not felt to be aneurysmal      Hypokalemia  Assessment & Plan  Replete to goal > 4, ERP  Serial BMP    Acute respiratory insufficiency  Assessment & Plan  Likely the result of IVF overload  Initially noted pt to be 6.5 liters (+)  Improved with forced diuresis, reassess daily  Mobilize and incentive spirometry    Alcohol abuse, daily use- (present on admission)  Assessment & Plan  Monitor for alcohol withdrawal   Replete electrolytes as needed, ERP  Vitamin supplementation as clinically appropriate    Class 1 obesity due to excess calories without serious comorbidity with body mass index (BMI) of 33.0 to 33.9 in adult- (present on admission)  Assessment & Plan  Weight loss to be  encouraged as clinically appropriate  Monitor for CHARLES  TSH is normal    Hypomagnesemia  Assessment & Plan  Replete to goal > 2, ERP  Serial magnesium level    Hyperlipidemia  Assessment & Plan  Weight loss to be encouraged  Diet  Statin when clinically appropriate       VTE:  Contraindicated  Ulcer: Not Indicated  Lines: None    I have performed a physical exam and reviewed and updated ROS and Plan today (4/21/2020). In review of yesterday's note (4/20/2020), there are no changes except as documented above.     Patient serum sodium trending down and blood pressure remains poorly controlled on neurosurgical floor.  Probable cerebral salt wasting. Patient transferred back per neurosurgery current titration of 3% saline.    Discussed patient condition and risk of morbidity and/or mortality with RN, Pharmacy, [facility] resident, Patient and neurosurgery     The patient remains critically ill.  Critical care time = 35 minutes in directly providing and coordinating critical care and extensive data review.  No time overlap and excludes procedures.

## 2020-04-21 NOTE — ASSESSMENT & PLAN NOTE
Return to ICU for tighter sodium management at the request neurosurgery 4/20  Improving  Question of CSS vs combination of SIADH and CSS, both shpould be transietn and continue to improve over the next few days to weeks  Serial sodium levels, every 6 hour lab  Goal sodium 135-145  Salt tabs 4g TID  Will tx to floor w/ 3% at set rate; nursing to call for titration  q6 sodiums  Added florinef which may need to be increased over the next few days  Encourage electrolyte oral salt over free H20

## 2020-04-21 NOTE — PROGRESS NOTES
Critical Care Progress Note    Date of admission  4/16/2020    Chief Complaint  60 y.o. male admitted 4/16/2020 with spontaneous SAH    Hospital Course    Mr. Rae is a pleasant 60 year old male with the past medical history of hypertension who developed sudden onset of neck stiffness and posterior severe HA with nausea and vomiting around 5 pm on 4/16.  He was seen at [facility] where a non-contrast CT head revealed a SAH.  He was transferred to [facility] and underwent CTA head/neck which did not show an aneurysm or AVM.  He was started on nicardipine drip for SBP goals <140.      Interval Problem Update  Reviewed last 24 hour events:    Neurosurgery requesting transfer back to ICU  Na significantly reduced last 24 hours from 1 35 -> 127 and then this afternoon 123  Neurologically unchanged and remains GCS 15 w/ no new focal symptoms  Denies nausea and vomiting but still has headache  Blood pressure remains poorly controlled in the 150-1 80 range  I do note he was on nicardipine drip while previously in the ICU and this is been reordered  Additionally, nimodipine has been discontinued and lisinopril and Coreg ordered  Taking p.o., while I have been seeing him repeatedly requesting water  Afebrile, T-max 99.6  Remains on room air with excellent saturations  SCDs for prophylaxis    4/18 notes from Dr Mallory   - no events overnight   - a/ox4   - nicardipine drip at 15-->7.5   - no neuro deficits   - slight nausea   - voiding in urinal   - SR 60-80   - -150s   - afebrile   - O2 at 4 lpm   - pt is 6.5 liters fluid positive   - no imaging   - Mg 1.7   - k 3.4    Review of Systems  Review of Systems   Constitutional: Negative for chills, fever and malaise/fatigue.   HENT: Negative for congestion, ear pain and sinus pain.    Eyes: Negative for blurred vision, double vision, photophobia and pain (Resolved a few days ago).   Respiratory: Negative for cough, hemoptysis and shortness of breath.     Cardiovascular: Negative for chest pain, palpitations and leg swelling.   Gastrointestinal: Negative for diarrhea, nausea (Resolved today) and vomiting.   Genitourinary: Negative for frequency, hematuria and urgency.   Musculoskeletal: Negative for back pain and neck pain (Resolved).   Skin: Negative for rash.   Neurological: Positive for headaches (Improved, pain 4 out of 10). Negative for sensory change, speech change, focal weakness, seizures and weakness.   Endo/Heme/Allergies: Does not bruise/bleed easily.   Psychiatric/Behavioral: Negative for depression. The patient is not nervous/anxious.    Sleep: Negative for EDS, snoring and apnea    Vital Signs for last 24 hours   Temp:  [36.8 °C (98.2 °F)-37.6 °C (99.6 °F)] 37.3 °C (99.1 °F)  Pulse:  [58-75] 71  Resp:  [14-37] 21  BP: (145-193)/(62-99) 153/71  SpO2:  [94 %-98 %] 96 %    Hemodynamic parameters for last 24 hours       Respiratory Information for the last 24 hours       Physical Exam   Physical Exam  Vitals signs and nursing note reviewed.   Constitutional:       General: He is not in acute distress.     Appearance: Normal appearance. He is obese. He is not ill-appearing or toxic-appearing.   HENT:      Head: Normocephalic and atraumatic.      Right Ear: External ear normal.      Left Ear: External ear normal.      Nose: Nose normal. No rhinorrhea.      Mouth/Throat:      Mouth: Mucous membranes are moist.      Pharynx: Oropharynx is clear. No oropharyngeal exudate.   Eyes:      General: No scleral icterus.     Extraocular Movements: Extraocular movements intact.      Conjunctiva/sclera: Conjunctivae normal.      Pupils: Pupils are equal, round, and reactive to light.   Neck:      Musculoskeletal: Normal range of motion and neck supple. No neck rigidity.      Vascular: No carotid bruit.   Cardiovascular:      Rate and Rhythm: Normal rate and regular rhythm.      Pulses: Normal pulses.      Heart sounds: Normal heart sounds. No murmur. No gallop.     Pulmonary:      Effort: Pulmonary effort is normal. No respiratory distress.      Breath sounds: Normal breath sounds. No wheezing or rhonchi.   Chest:      Chest wall: No tenderness.   Abdominal:      General: Abdomen is flat. Bowel sounds are normal. There is no distension.      Palpations: Abdomen is soft.      Tenderness: There is no abdominal tenderness. There is no right CVA tenderness, left CVA tenderness, guarding or rebound.   Musculoskeletal: Normal range of motion.         General: No swelling.      Right lower leg: No edema.      Left lower leg: No edema.   Lymphadenopathy:      Cervical: No cervical adenopathy.   Skin:     General: Skin is warm and dry.      Capillary Refill: Capillary refill takes less than 2 seconds.      Coloration: Skin is not cyanotic.      Findings: No rash.      Nails: There is no clubbing.     Neurological:      General: No focal deficit present.      Mental Status: He is alert and oriented to person, place, and time.      GCS: GCS eye subscore is 4. GCS verbal subscore is 5. GCS motor subscore is 6.      Cranial Nerves: No cranial nerve deficit.      Sensory: No sensory deficit.      Motor: Motor function is intact. No weakness.      Coordination: Coordination is intact.      Comments: Follows well   Psychiatric:         Mood and Affect: Mood normal.         Speech: Speech normal.         Behavior: Behavior normal.         Thought Content: Thought content normal.         Judgment: Judgment normal.         Medications  Current Facility-Administered Medications   Medication Dose Route Frequency Provider Last Rate Last Dose   • lisinopril (PRINIVIL) tablet 20 mg  20 mg Oral Q DAY Igor Macias M.D.       • carvedilol (COREG) tablet 12.5 mg  12.5 mg Oral BID WITH MEALS Igor Macias M.D.   12.5 mg at 04/20/20 1641   • hydrOXYzine HCl (ATARAX) tablet 50 mg  50 mg Oral TID PRN Rhiannon Araya M.D.       • sodium chloride (SALT) tablet 1 g  1 g Oral TID WITH MEALS Rhiannon Araya  M.D.   1 g at 04/20/20 1641   • 3% sodium chloride (HYPERTONIC SALINE) 500mL infusion   Intravenous Continuous Carmen Barker M.D. 30 mL/hr at 04/20/20 2032 30 mL at 04/20/20 2032   • niCARdipine (CARDENE) 25 mg in  mL Infusion  0-15 mg/hr Intravenous Continuous Carmen Barker M.D.   Stopped at 04/20/20 1900   • hydrALAZINE (APRESOLINE) injection 10-20 mg  10-20 mg Intravenous Q6HRS PRN Ezra Alvarado M.D.   20 mg at 04/20/20 2041   • labetalol (NORMODYNE/TRANDATE) injection 10 mg  10 mg Intravenous Q6HRS PRN Ezar Alvarado M.D.       • enalaprilat (VASOTEC) injection 0.625 mg  0.625 mg Intravenous Q6HRS PRN Ezra Alvarado M.D.       • hydrALAZINE (APRESOLINE) tablet 25 mg  25 mg Oral Q8HRS Rhiannno Araya M.D.   Stopped at 04/20/20 2200   • fluticasone (FLONASE) nasal spray 100 mcg  2 Spray Nasal DAILY Dedra Mallory M.D.   100 mcg at 04/19/20 0536   • calcium carbonate (TUMS) chewable tab 1,000 mg  1,000 mg Oral Q6HRS PRN Rudy Adams M.D.   1,000 mg at 04/20/20 1109   • hyoscyamine-maalox plus-lidocaine viscous (GI COCKTAIL) oral susp 30 mL  30 mL Oral Once PRN Rudy Adams M.D.       • senna-docusate (PERICOLACE or SENOKOT S) 8.6-50 MG per tablet 2 Tab  2 Tab Oral BID Brandon Fajardo M.D.   2 Tab at 04/20/20 1641    And   • polyethylene glycol/lytes (MIRALAX) PACKET 1 Packet  1 Packet Oral QDAY PRN Brandon Fajardo M.D.        And   • magnesium hydroxide (MILK OF MAGNESIA) suspension 30 mL  30 mL Oral QDAY PRN Brandon Fajardo M.D.        And   • bisacodyl (DULCOLAX) suppository 10 mg  10 mg Rectal QDAY PRN Brandon Fajardo M.D.       • acetaminophen (TYLENOL) tablet 650 mg  650 mg Oral Q6HRS PRN Brandon Fajardo M.D.   650 mg at 04/20/20 2010   • Respiratory Therapy Consult   Nebulization Continuous RT Brandon Fajardo M.D.       • ondansetron (ZOFRAN ODT) dispertab 4 mg  4 mg Oral Q4HRS PRN Brandon Fajardo M.D.        Or   • ondansetron (ZOFRAN) syringe/vial injection 4 mg  4 mg  Intravenous Q4HRS PRN Brandon Fajardo M.D.   4 mg at 04/18/20 1443       Fluids    Intake/Output Summary (Last 24 hours) at 4/21/2020 0011  Last data filed at 4/20/2020 2200  Gross per 24 hour   Intake 644 ml   Output 1100 ml   Net -456 ml       Laboratory          Recent Labs     04/18/20  0437  04/20/20  0507 04/20/20  1500 04/20/20  1733   SODIUM 137   < > 127* 123* 123*   POTASSIUM 3.4*   < > 3.5* 3.8 3.5*   CHLORIDE 106   < > 89* 86* 86*   CO2 19*   < > 24 23 21   BUN 18   < > 18 19 20   CREATININE 0.80   < > 0.73 0.79 0.72   MAGNESIUM 1.7  --   --  2.0  --    CALCIUM 7.8*   < > 8.7 8.9 8.7    < > = values in this interval not displayed.     Recent Labs     04/20/20  0507 04/20/20  1500 04/20/20  1733   GLUCOSE 138* 126* 148*     Recent Labs     04/20/20  0507   WBC 12.1*     Recent Labs     04/19/20  0910 04/20/20  0507   RBC  --  5.40   HEMOGLOBIN  --  14.5   HEMATOCRIT  --  43.0   PLATELETCT  --  289   IRON  --  30*   FERRITIN 530.0*  --    TOTIRONBC  --  206*       Imaging  CTA neck:  IMPRESSION:     Mild carotid and vertebral artery atherosclerosis without significant stenosis, aneurysm or occlusion     Mild bronchial wall thickening and groundglass could indicate edema. Reactive airways disease/infection is not excluded    CTA head:  IMPRESSION:     CT angiogram of the [place] is unable to identify source of subarachnoid hemorrhage     Slight new visualization of acute subarachnoid hemorrhage in the lateral ventricles and there is slight temporal lobe dilatation. Attention in follow-up is advised     Otherwise stable moderate acute subarachnoid hemorrhage filling the basal cisterns as above    Assessment/Plan  * Non-aneurysmal perimesencephalic subarachnoid hemorrhage (HCC)- (present on admission)  Assessment & Plan  With no clear aneurysm demonstrated on CTA head/neck on 4/16  CT angio 4/17 without AVM or aneurysm, transferred to neurosurgery floor 4/18  Return to ICU 4/20 sodium and BP control  per neurosurgery  Neurochecks every 4 hours, monitor for the need for more frequent  Monitor for the need for follow-up imaging studies, confer with neurosurgery daily  SBP goals <160  Goal sodium 135-145  Euglycemia  Pain control without oversedation  Nimotop stopped and patient transition to ACE inhibitor and beta-blocker while on neurosurgical floor      Hyponatremia  Assessment & Plan  Return to ICU for tighter sodium management at the request neurosurgery 4/20  Low Na secondary to cerebral salt wasting from SAH?  Serial sodium levels, every 6 hour lab  Goal sodium 135-145, increase sodium no greater than 9 mEq in 24 hours  Start salt tabs  1500 cc fluid restriction  Start at 3% saline at 20 cc an hour  Monitor for need for further eval    Hypertensive emergency- (present on admission)  Assessment & Plan  Remains uncontrolled  Resume nicardipine drip as needed  I am actively titrating nicardipine drip to maintain SBP<160  Hydralazine and labetalol prn  Continue oral amlodipine 5mg daily and hydralazine 25mg every 8 hours    Hypokalemia  Assessment & Plan  Replete to goal > 4, ERP  Serial BMP    Acute respiratory insufficiency  Assessment & Plan  Likely the result of IVF overload  Initially noted pt to be 6.5 liters (+)  Improved with forced diuresis, reassess daily  Mobilize and incentive spirometry    Alcohol abuse, daily use  Assessment & Plan  Monitor for alcohol withdrawal   Replete electrolytes as needed, ERP  Vitamin supplementation as clinically appropriate    Class 1 obesity due to excess calories without serious comorbidity with body mass index (BMI) of 33.0 to 33.9 in adult  Assessment & Plan  Weight loss to be encouraged as clinically appropriate  Monitor for CHARLES  TSH is normal    Hypomagnesemia  Assessment & Plan  Replete to goal > 2, ERP  Serial magnesium level    Hyperlipidemia  Assessment & Plan  Weight loss to be encouraged  Diet  Statin when clinically appropriate       VTE:   Contraindicated  Ulcer: Not Indicated  Lines: None    I have performed a physical exam and reviewed and updated ROS and Plan today (4/21/2020). In review of yesterday's note (4/20/2020), there are no changes except as documented above.     Patient serum sodium trending down and blood pressure remains poorly controlled on neurosurgical floor.  Patient transferred back per neurosurgery and will initiate continuous infusion of nicardipine and 3% saline.    Discussed patient condition and risk of morbidity and/or mortality with RN, Pharmacy, [facility] resident, Patient and neurosurgery     The patient remains critically ill.  Critical care time = 45 minutes in directly providing and coordinating critical care and extensive data review.  No time overlap and excludes procedures.

## 2020-04-22 PROBLEM — E83.39 HYPOPHOSPHATEMIA: Status: ACTIVE | Noted: 2020-04-22

## 2020-04-22 LAB
ANION GAP SERPL CALC-SCNC: 12 MMOL/L (ref 7–16)
ANION GAP SERPL CALC-SCNC: 12 MMOL/L (ref 7–16)
ANION GAP SERPL CALC-SCNC: 13 MMOL/L (ref 7–16)
ANION GAP SERPL CALC-SCNC: 14 MMOL/L (ref 7–16)
BASOPHILS # BLD AUTO: 0.3 % (ref 0–1.8)
BASOPHILS # BLD: 0.03 K/UL (ref 0–0.12)
BUN SERPL-MCNC: 15 MG/DL (ref 8–22)
BUN SERPL-MCNC: 17 MG/DL (ref 8–22)
BUN SERPL-MCNC: 17 MG/DL (ref 8–22)
BUN SERPL-MCNC: 19 MG/DL (ref 8–22)
CALCIUM SERPL-MCNC: 7.5 MG/DL (ref 8.5–10.5)
CALCIUM SERPL-MCNC: 7.7 MG/DL (ref 8.5–10.5)
CALCIUM SERPL-MCNC: 7.9 MG/DL (ref 8.5–10.5)
CALCIUM SERPL-MCNC: 8.3 MG/DL (ref 8.5–10.5)
CHLORIDE SERPL-SCNC: 96 MMOL/L (ref 96–112)
CHLORIDE SERPL-SCNC: 96 MMOL/L (ref 96–112)
CHLORIDE SERPL-SCNC: 98 MMOL/L (ref 96–112)
CHLORIDE SERPL-SCNC: 99 MMOL/L (ref 96–112)
CO2 SERPL-SCNC: 17 MMOL/L (ref 20–33)
CO2 SERPL-SCNC: 18 MMOL/L (ref 20–33)
CO2 SERPL-SCNC: 19 MMOL/L (ref 20–33)
CO2 SERPL-SCNC: 20 MMOL/L (ref 20–33)
CREAT SERPL-MCNC: 0.74 MG/DL (ref 0.5–1.4)
CREAT SERPL-MCNC: 0.8 MG/DL (ref 0.5–1.4)
CREAT SERPL-MCNC: 0.82 MG/DL (ref 0.5–1.4)
CREAT SERPL-MCNC: 0.82 MG/DL (ref 0.5–1.4)
EOSINOPHIL # BLD AUTO: 0.03 K/UL (ref 0–0.51)
EOSINOPHIL NFR BLD: 0.3 % (ref 0–6.9)
ERYTHROCYTE [DISTWIDTH] IN BLOOD BY AUTOMATED COUNT: 38.7 FL (ref 35.9–50)
GLUCOSE SERPL-MCNC: 115 MG/DL (ref 65–99)
GLUCOSE SERPL-MCNC: 115 MG/DL (ref 65–99)
GLUCOSE SERPL-MCNC: 130 MG/DL (ref 65–99)
GLUCOSE SERPL-MCNC: 147 MG/DL (ref 65–99)
HCT VFR BLD AUTO: 43.6 % (ref 42–52)
HGB BLD-MCNC: 14.5 G/DL (ref 14–18)
IMM GRANULOCYTES # BLD AUTO: 0.09 K/UL (ref 0–0.11)
IMM GRANULOCYTES NFR BLD AUTO: 0.8 % (ref 0–0.9)
LYMPHOCYTES # BLD AUTO: 1.38 K/UL (ref 1–4.8)
LYMPHOCYTES NFR BLD: 12.2 % (ref 22–41)
MAGNESIUM SERPL-MCNC: 2.1 MG/DL (ref 1.5–2.5)
MCH RBC QN AUTO: 26.6 PG (ref 27–33)
MCHC RBC AUTO-ENTMCNC: 33.3 G/DL (ref 33.7–35.3)
MCV RBC AUTO: 79.9 FL (ref 81.4–97.8)
MONOCYTES # BLD AUTO: 1.9 K/UL (ref 0–0.85)
MONOCYTES NFR BLD AUTO: 16.8 % (ref 0–13.4)
NEUTROPHILS # BLD AUTO: 7.9 K/UL (ref 1.82–7.42)
NEUTROPHILS NFR BLD: 69.6 % (ref 44–72)
NRBC # BLD AUTO: 0 K/UL
NRBC BLD-RTO: 0 /100 WBC
PHOSPHATE SERPL-MCNC: 2.3 MG/DL (ref 2.5–4.5)
PHOSPHATE SERPL-MCNC: 2.6 MG/DL (ref 2.5–4.5)
PLATELET # BLD AUTO: 286 K/UL (ref 164–446)
PMV BLD AUTO: 8.9 FL (ref 9–12.9)
POTASSIUM SERPL-SCNC: 3.5 MMOL/L (ref 3.6–5.5)
POTASSIUM SERPL-SCNC: 3.5 MMOL/L (ref 3.6–5.5)
POTASSIUM SERPL-SCNC: 3.6 MMOL/L (ref 3.6–5.5)
POTASSIUM SERPL-SCNC: 3.6 MMOL/L (ref 3.6–5.5)
RBC # BLD AUTO: 5.46 M/UL (ref 4.7–6.1)
SODIUM SERPL-SCNC: 127 MMOL/L (ref 135–145)
SODIUM SERPL-SCNC: 128 MMOL/L (ref 135–145)
SODIUM SERPL-SCNC: 129 MMOL/L (ref 135–145)
SODIUM SERPL-SCNC: 130 MMOL/L (ref 135–145)
WBC # BLD AUTO: 11.3 K/UL (ref 4.8–10.8)

## 2020-04-22 PROCEDURE — 700105 HCHG RX REV CODE 258: Performed by: INTERNAL MEDICINE

## 2020-04-22 PROCEDURE — 84244 ASSAY OF RENIN: CPT

## 2020-04-22 PROCEDURE — 700101 HCHG RX REV CODE 250: Performed by: PSYCHIATRY & NEUROLOGY

## 2020-04-22 PROCEDURE — 700101 HCHG RX REV CODE 250: Performed by: INTERNAL MEDICINE

## 2020-04-22 PROCEDURE — A9270 NON-COVERED ITEM OR SERVICE: HCPCS | Performed by: STUDENT IN AN ORGANIZED HEALTH CARE EDUCATION/TRAINING PROGRAM

## 2020-04-22 PROCEDURE — 700111 HCHG RX REV CODE 636 W/ 250 OVERRIDE (IP): Performed by: PSYCHIATRY & NEUROLOGY

## 2020-04-22 PROCEDURE — 770022 HCHG ROOM/CARE - ICU (200)

## 2020-04-22 PROCEDURE — 80048 BASIC METABOLIC PNL TOTAL CA: CPT | Mod: 91

## 2020-04-22 PROCEDURE — 700102 HCHG RX REV CODE 250 W/ 637 OVERRIDE(OP): Performed by: STUDENT IN AN ORGANIZED HEALTH CARE EDUCATION/TRAINING PROGRAM

## 2020-04-22 PROCEDURE — 700102 HCHG RX REV CODE 250 W/ 637 OVERRIDE(OP): Performed by: INTERNAL MEDICINE

## 2020-04-22 PROCEDURE — A9270 NON-COVERED ITEM OR SERVICE: HCPCS | Performed by: INTERNAL MEDICINE

## 2020-04-22 PROCEDURE — 84100 ASSAY OF PHOSPHORUS: CPT

## 2020-04-22 PROCEDURE — 83735 ASSAY OF MAGNESIUM: CPT

## 2020-04-22 PROCEDURE — 700102 HCHG RX REV CODE 250 W/ 637 OVERRIDE(OP): Performed by: PSYCHIATRY & NEUROLOGY

## 2020-04-22 PROCEDURE — 99291 CRITICAL CARE FIRST HOUR: CPT | Performed by: PSYCHIATRY & NEUROLOGY

## 2020-04-22 PROCEDURE — 85025 COMPLETE CBC W/AUTO DIFF WBC: CPT

## 2020-04-22 PROCEDURE — A9270 NON-COVERED ITEM OR SERVICE: HCPCS | Performed by: PSYCHIATRY & NEUROLOGY

## 2020-04-22 PROCEDURE — 700105 HCHG RX REV CODE 258: Performed by: PSYCHIATRY & NEUROLOGY

## 2020-04-22 PROCEDURE — 700111 HCHG RX REV CODE 636 W/ 250 OVERRIDE (IP): Performed by: INTERNAL MEDICINE

## 2020-04-22 RX ORDER — POTASSIUM CHLORIDE 20 MEQ/1
40 TABLET, EXTENDED RELEASE ORAL ONCE
Status: COMPLETED | OUTPATIENT
Start: 2020-04-22 | End: 2020-04-22

## 2020-04-22 RX ORDER — HYDRALAZINE HYDROCHLORIDE 20 MG/ML
10 INJECTION INTRAMUSCULAR; INTRAVENOUS EVERY 4 HOURS PRN
Status: DISCONTINUED | OUTPATIENT
Start: 2020-04-22 | End: 2020-04-23

## 2020-04-22 RX ORDER — LABETALOL HYDROCHLORIDE 5 MG/ML
10 INJECTION, SOLUTION INTRAVENOUS EVERY 4 HOURS PRN
Status: DISCONTINUED | OUTPATIENT
Start: 2020-04-22 | End: 2020-04-28 | Stop reason: HOSPADM

## 2020-04-22 RX ORDER — SODIUM CHLORIDE 1 G/1
4 TABLET ORAL
Status: DISCONTINUED | OUTPATIENT
Start: 2020-04-22 | End: 2020-04-28

## 2020-04-22 RX ORDER — HYDRALAZINE HYDROCHLORIDE 25 MG/1
25 TABLET, FILM COATED ORAL EVERY 6 HOURS
Status: DISCONTINUED | OUTPATIENT
Start: 2020-04-22 | End: 2020-04-23

## 2020-04-22 RX ORDER — LISINOPRIL 20 MG/1
40 TABLET ORAL
Status: DISCONTINUED | OUTPATIENT
Start: 2020-04-22 | End: 2020-04-28 | Stop reason: HOSPADM

## 2020-04-22 RX ORDER — FLUTICASONE PROPIONATE 50 MCG
2 SPRAY, SUSPENSION (ML) NASAL
Status: DISCONTINUED | OUTPATIENT
Start: 2020-04-22 | End: 2020-04-28 | Stop reason: HOSPADM

## 2020-04-22 RX ORDER — LISINOPRIL 20 MG/1
20 TABLET ORAL ONCE
Status: COMPLETED | OUTPATIENT
Start: 2020-04-22 | End: 2020-04-22

## 2020-04-22 RX ADMIN — LISINOPRIL 20 MG: 20 TABLET ORAL at 05:36

## 2020-04-22 RX ADMIN — SODIUM CHLORIDE 12.5 MG/HR: 9 INJECTION, SOLUTION INTRAVENOUS at 21:23

## 2020-04-22 RX ADMIN — HYDRALAZINE HYDROCHLORIDE 25 MG: 25 TABLET, FILM COATED ORAL at 05:36

## 2020-04-22 RX ADMIN — HYDRALAZINE HYDROCHLORIDE 10 MG: 20 INJECTION INTRAMUSCULAR; INTRAVENOUS at 12:06

## 2020-04-22 RX ADMIN — SODIUM CHLORIDE 2.5 MG/HR: 9 INJECTION, SOLUTION INTRAVENOUS at 04:13

## 2020-04-22 RX ADMIN — HYDRALAZINE HYDROCHLORIDE 25 MG: 25 TABLET, FILM COATED ORAL at 16:48

## 2020-04-22 RX ADMIN — SODIUM CHLORIDE 5 MG/HR: 9 INJECTION, SOLUTION INTRAVENOUS at 12:33

## 2020-04-22 RX ADMIN — ACETAMINOPHEN 650 MG: 325 TABLET, FILM COATED ORAL at 19:30

## 2020-04-22 RX ADMIN — Medication 4 G: at 16:48

## 2020-04-22 RX ADMIN — SODIUM CHLORIDE 15 MG/HR: 9 INJECTION, SOLUTION INTRAVENOUS at 14:45

## 2020-04-22 RX ADMIN — ACETAMINOPHEN 650 MG: 325 TABLET, FILM COATED ORAL at 02:30

## 2020-04-22 RX ADMIN — POTASSIUM PHOSPHATE, MONOBASIC AND POTASSIUM PHOSPHATE, DIBASIC 15 MMOL: 224; 236 INJECTION, SOLUTION, CONCENTRATE INTRAVENOUS at 09:04

## 2020-04-22 RX ADMIN — CARVEDILOL 12.5 MG: 6.25 TABLET, FILM COATED ORAL at 16:48

## 2020-04-22 RX ADMIN — CARVEDILOL 12.5 MG: 6.25 TABLET, FILM COATED ORAL at 07:31

## 2020-04-22 RX ADMIN — POTASSIUM PHOSPHATE, MONOBASIC AND POTASSIUM PHOSPHATE, DIBASIC 15 MMOL: 224; 236 INJECTION, SOLUTION, CONCENTRATE INTRAVENOUS at 16:09

## 2020-04-22 RX ADMIN — ACETAMINOPHEN 650 MG: 325 TABLET, FILM COATED ORAL at 07:31

## 2020-04-22 RX ADMIN — SODIUM CHLORIDE 30 ML: 3 INJECTION, SOLUTION INTRAVENOUS at 04:12

## 2020-04-22 RX ADMIN — Medication 2 SPRAY: at 11:53

## 2020-04-22 RX ADMIN — LISINOPRIL 40 MG: 20 TABLET ORAL at 13:50

## 2020-04-22 RX ADMIN — HYDRALAZINE HYDROCHLORIDE 25 MG: 25 TABLET, FILM COATED ORAL at 11:46

## 2020-04-22 RX ADMIN — Medication 4 G: at 10:29

## 2020-04-22 RX ADMIN — ACETAMINOPHEN 650 MG: 325 TABLET, FILM COATED ORAL at 13:00

## 2020-04-22 RX ADMIN — LISINOPRIL 20 MG: 20 TABLET ORAL at 10:29

## 2020-04-22 RX ADMIN — LABETALOL HYDROCHLORIDE 10 MG: 5 INJECTION, SOLUTION INTRAVENOUS at 11:36

## 2020-04-22 RX ADMIN — SODIUM CHLORIDE 15 MG/HR: 9 INJECTION, SOLUTION INTRAVENOUS at 23:21

## 2020-04-22 RX ADMIN — AMLODIPINE BESYLATE 10 MG: 10 TABLET ORAL at 05:36

## 2020-04-22 RX ADMIN — SODIUM CHLORIDE 15 MG/HR: 9 INJECTION, SOLUTION INTRAVENOUS at 19:29

## 2020-04-22 RX ADMIN — SODIUM CHLORIDE 10 MG/HR: 9 INJECTION, SOLUTION INTRAVENOUS at 16:43

## 2020-04-22 RX ADMIN — SODIUM CHLORIDE: 3 INJECTION, SOLUTION INTRAVENOUS at 19:38

## 2020-04-22 RX ADMIN — Medication 2 G: at 07:31

## 2020-04-22 RX ADMIN — POTASSIUM CHLORIDE 40 MEQ: 1500 TABLET, EXTENDED RELEASE ORAL at 22:06

## 2020-04-22 RX ADMIN — ENALAPRILAT 0.62 MG: 1.25 INJECTION INTRAVENOUS at 11:48

## 2020-04-22 RX ADMIN — HYDRALAZINE HYDROCHLORIDE 25 MG: 25 TABLET, FILM COATED ORAL at 23:06

## 2020-04-22 RX ADMIN — CARBOXYMETHYLCELLULOSE SODIUM 2 DROP: 5 SOLUTION/ DROPS OPHTHALMIC at 11:51

## 2020-04-22 ASSESSMENT — ENCOUNTER SYMPTOMS
EYE PAIN: 0
COUGH: 0
SHORTNESS OF BREATH: 0
HEMOPTYSIS: 0
DIZZINESS: 0
CHILLS: 0
PALPITATIONS: 0
FALLS: 0
DEPRESSION: 0
HALLUCINATIONS: 0
BRUISES/BLEEDS EASILY: 0
SENSORY CHANGE: 0
PHOTOPHOBIA: 0
FOCAL WEAKNESS: 0
DIARRHEA: 0
VOMITING: 0
DOUBLE VISION: 0
NAUSEA: 0
FEVER: 0
WEAKNESS: 0
SPEECH CHANGE: 0
HEADACHES: 1
NERVOUS/ANXIOUS: 0
BACK PAIN: 0
MYALGIAS: 0
SEIZURES: 0
POLYDIPSIA: 0
BLURRED VISION: 0
NECK PAIN: 0
SINUS PAIN: 0

## 2020-04-22 NOTE — PROGRESS NOTES
Neurosurgery Progress Note    Subjective:  Hospital Day #7 SAH with severe headache  BP up during night with IV blown --new line obtained and meds restarted.  Sx onsetw hen Arguing on phone--Hx HTN.  Serial scans show stable SAH  No aneurysm seen during IR procedure.   Was transferred back to ICU on  for hypertonic saline treatment for cerebral Na wasting.  Na 129 this AM.  More headache thru night withBP med line blown.  Still hypertensive despite multiple medications  HA Stable and controlled with Tylenol  Renal Artery US appears to be normal    Exam:  GCS 15  No drift,   Motor 5/5 and symmetric  Appropriate   EOM intact, pupils equal   Very anxious     BP  Min: 118/52  Max: 200/96  Pulse  Av.8  Min: 63  Max: 82  Resp  Av.2  Min: 12  Max: 99  Temp  Av.2 °C (98.9 °F)  Min: 36.9 °C (98.5 °F)  Max: 37.7 °C (99.8 °F)  SpO2  Av.2 %  Min: 90 %  Max: 99 %    No data recorded    Recent Labs     20  0507 20  0450 20  0200   WBC 12.1* 11.5* 11.3*   RBC 5.40 5.32 5.46   HEMOGLOBIN 14.5 14.4 14.5   HEMATOCRIT 43.0 40.6* 43.6   MCV 79.6* 76.3* 79.9*   MCH 26.9* 27.1 26.6*   MCHC 33.7 35.5* 33.3*   RDW 39.2 36.2 38.7   PLATELETCT 289 248 286   MPV 9.3 8.9* 8.9*     Recent Labs     20  1415 20  0200   SODIUM 129* 130* 129*   POTASSIUM 3.4* 4.3 3.6   CHLORIDE 94* 99 96   CO2 20 18* 20   GLUCOSE 114* 119* 115*   BUN 23* 22 19   CREATININE 0.79 0.83 0.82   CALCIUM 8.0* 7.9* 8.3*               Intake/Output       20 0700 - 20 0659 20 0700 - 20 0659       Total  Total       Intake    P.O.  800  750 1550  --  -- --    P.O.  -- -- --    I.V.  595.3  1041.3 1636.5  --  -- --    Cardene Volume 173.8 681.3 855 -- -- --    Volume (mL) (3% sodium chloride (HYPERTONIC SALINE) 500mL infusion) 421.5 360 781.5 -- -- --    Total Intake 1395.3 1791.3 3186.5 -- -- --       Output    Urine  658 0856 7359   --  -- --    Number of Times Voided -- 2 x 2 x -- -- --    Urine Void (mL) 975 1300 2275 -- -- --    Stool  --  -- --  --  -- --    Number of Times Stooled 2 x 2 x 4 x -- -- --    Total Output 975 1300 2275 -- -- --       Net I/O     420.3 491.3 911.5 -- -- --            Intake/Output Summary (Last 24 hours) at 4/22/2020 0721  Last data filed at 4/22/2020 0600  Gross per 24 hour   Intake 3186.5 ml   Output 2075 ml   Net 1111.5 ml            • labetalol  10 mg Q4HRS PRN   • hydrALAZINE  10 mg Q4HRS PRN   • MD Alert...Adult ICU Electrolyte Replacement per Pharmacy   PHARMACY TO DOSE   • sodium chloride  2 g TID WITH MEALS   • carboxymethylcellulose  1-2 Drop PRN   • sodium chloride  2 Spray Q2HRS PRN   • amLODIPine  10 mg Q DAY   • lisinopril  20 mg Q DAY   • carvedilol  12.5 mg BID WITH MEALS   • hydrOXYzine HCl  50 mg TID PRN   • 3% sodium chloride   Continuous   • niCARdipine infusion  0-15 mg/hr Continuous   • enalaprilat  0.625 mg Q6HRS PRN   • hydrALAZINE  25 mg Q8HRS   • fluticasone  2 Spray DAILY   • calcium carbonate  1,000 mg Q6HRS PRN   • hyoscyamine-maalox plus-lidocaine viscous  30 mL Once PRN   • senna-docusate  2 Tab BID    And   • polyethylene glycol/lytes  1 Packet QDAY PRN    And   • magnesium hydroxide  30 mL QDAY PRN    And   • bisacodyl  10 mg QDAY PRN   • acetaminophen  650 mg Q6HRS PRN   • Respiratory Therapy Consult   Continuous RT   • ondansetron  4 mg Q4HRS PRN    Or   • ondansetron  4 mg Q4HRS PRN       Assessment and Plan:  Hospital day #7  Stable SAH with Hypotensive urgency  Appreciate hospitalist/Intensivist care.  SBP to be <160mmHg  Na goals of 135-145  OK to transfer to floor from NS standpoint when BP controlled  Q 4hr neuro checks   PT/OT  Xanax prn for anxiety  Pt needs repeat CTA in 6 weeks  Will f/u in [place] with [facility]   Answered all questions  Will continue to follow       Prophylactic anticoagulation: no         Start date/time:

## 2020-04-22 NOTE — PROGRESS NOTES
Critical Care Progress Note    Date of admission  4/16/2020    Chief Complaint  60 y.o. male admitted 4/16/2020 with spontaneous SAH    Hospital Course    Mr. Rae is a pleasant 60 year old male with the past medical history of hypertension who developed sudden onset of neck stiffness and posterior severe HA with nausea and vomiting around 5 pm on 4/16.  He was seen at [facility] where a non-contrast CT head revealed a SAH.  He was transferred to [facility] and underwent CTA head/neck which did not show an aneurysm or AVM.  He was started on nicardipine drip for SBP goals <140.      Interval Problem Update  Reviewed last 24 hour events:  - Tx back to ICU for probable cerebral salt wasting 2/2 SAH  - HA tylenol   - Neuro: GCS 15; HA   - HR: 60-70s   - SBP: 130-160s   - GI: Advance diet as tolerated   - UOP: 2.2L   - + BM this am   - Tm: 37.5   - Lines: PIVs   - PPx: GI not indicated, DVT SCDs   - Na 129   - Current titration of 3% and nicardipine   - adjusting oral hypertensives      Review of Systems  Review of Systems   Constitutional: Negative for chills, fever and malaise/fatigue.   HENT: Negative for congestion, ear pain and sinus pain.    Eyes: Negative for blurred vision, double vision, photophobia and pain.   Respiratory: Negative for cough, hemoptysis and shortness of breath.    Cardiovascular: Negative for chest pain, palpitations and leg swelling.   Gastrointestinal: Negative for diarrhea, nausea and vomiting.   Genitourinary: Negative for frequency, hematuria and urgency.   Musculoskeletal: Negative for back pain and neck pain.   Skin: Negative for rash.   Neurological: Positive for headaches. Negative for sensory change, speech change, focal weakness, seizures and weakness.   Endo/Heme/Allergies: Does not bruise/bleed easily.   Psychiatric/Behavioral: Negative for depression. The patient is not nervous/anxious.    Sleep: Negative for EDS, snoring and apnea    Vital Signs for last 24 hours   Temp:   [36.9 °C (98.5 °F)-37.7 °C (99.8 °F)] 37.1 °C (98.8 °F)  Pulse:  [63-82] 75  Resp:  [12-99] 19  BP: (118-200)/(52-96) 165/73  SpO2:  [90 %-99 %] 97 %    Hemodynamic parameters for last 24 hours       Respiratory Information for the last 24 hours       Physical Exam   Physical Exam  Vitals signs and nursing note reviewed.   Constitutional:       General: He is not in acute distress.     Appearance: Normal appearance. He is obese. He is not ill-appearing or toxic-appearing.   HENT:      Head: Normocephalic and atraumatic.      Right Ear: External ear normal.      Left Ear: External ear normal.      Nose: Nose normal. No rhinorrhea.      Mouth/Throat:      Mouth: Mucous membranes are moist.      Pharynx: Oropharynx is clear. No oropharyngeal exudate.   Eyes:      General: No scleral icterus.     Extraocular Movements: Extraocular movements intact.      Conjunctiva/sclera: Conjunctivae normal.      Pupils: Pupils are equal, round, and reactive to light.   Neck:      Musculoskeletal: Normal range of motion.   Cardiovascular:      Rate and Rhythm: Normal rate and regular rhythm.      Pulses: Normal pulses.   Pulmonary:      Effort: Pulmonary effort is normal. No respiratory distress.      Breath sounds: Normal breath sounds. No wheezing or rhonchi.   Chest:      Chest wall: No tenderness.   Abdominal:      General: Abdomen is flat. Bowel sounds are normal. There is no distension.      Palpations: Abdomen is soft.      Tenderness: There is no abdominal tenderness. There is no right CVA tenderness, left CVA tenderness, guarding or rebound.   Musculoskeletal: Normal range of motion.         General: No swelling.      Right lower leg: No edema.      Left lower leg: No edema.   Skin:     General: Skin is warm and dry.      Capillary Refill: Capillary refill takes less than 2 seconds.      Coloration: Skin is not cyanotic.      Findings: No rash.      Nails: There is no clubbing.     Neurological:      General: No focal deficit  present.      Mental Status: He is alert and oriented to person, place, and time.      GCS: GCS eye subscore is 4. GCS verbal subscore is 5. GCS motor subscore is 6.      Cranial Nerves: No cranial nerve deficit.      Sensory: No sensory deficit.      Motor: Motor function is intact. No weakness.      Coordination: Coordination is intact.      Comments: GCS 15. Full strength throughout. No ataxia   Psychiatric:         Mood and Affect: Mood normal.         Speech: Speech normal.         Behavior: Behavior normal.         Thought Content: Thought content normal.         Judgment: Judgment normal.         Medications  Current Facility-Administered Medications   Medication Dose Route Frequency Provider Last Rate Last Dose   • MD Alert...ICU Electrolyte Replacement per Pharmacy   Other PHARMACY TO DOSE Frank Sorto M.D.       • sodium chloride (SALT) tablet 2 g  2 g Oral TID WITH MEALS Frank Sorto M.D.   2 g at 04/21/20 1818   • carboxymethylcellulose (REFRESH TEARS) 0.5 % ophthalmic drops 1-2 Drop  1-2 Drop Both Eyes PRN Carmen Barker M.D.   1 Drop at 04/21/20 2218   • sodium chloride (OCEAN) 0.65 % nasal spray 2 Spray  2 Spray Nasal Q2HRS PRN Carmen Barker M.D.       • amLODIPine (NORVASC) tablet 10 mg  10 mg Oral Q DAY Carmen Barker M.D.   10 mg at 04/22/20 0536   • lisinopril (PRINIVIL) tablet 20 mg  20 mg Oral Q DAY Igor Macias M.D.   20 mg at 04/22/20 0536   • carvedilol (COREG) tablet 12.5 mg  12.5 mg Oral BID WITH MEALS Igor Macias M.D.   12.5 mg at 04/21/20 1818   • hydrOXYzine HCl (ATARAX) tablet 50 mg  50 mg Oral TID PRN Rhiannon Araya M.D.   50 mg at 04/21/20 0333   • 3% sodium chloride (HYPERTONIC SALINE) 500mL infusion   Intravenous Continuous Carmen Barker M.D. 30 mL/hr at 04/22/20 0412 30 mL at 04/22/20 0412   • niCARdipine (CARDENE) 25 mg in  mL Infusion  0-15 mg/hr Intravenous Continuous Carmen Barker M.D. 100 mL/hr at 04/22/20 0645 10 mg/hr at 04/22/20 0645   •  hydrALAZINE (APRESOLINE) injection 10-20 mg  10-20 mg Intravenous Q6HRS PRN Ezra Alvarado M.D.   20 mg at 04/21/20 0232   • labetalol (NORMODYNE/TRANDATE) injection 10 mg  10 mg Intravenous Q6HRS PRN Ezra Alvarado M.D.   10 mg at 04/21/20 0133   • enalaprilat (VASOTEC) injection 0.625 mg  0.625 mg Intravenous Q6HRS PRN Ezra Alvarado M.D.   0.625 mg at 04/21/20 0037   • hydrALAZINE (APRESOLINE) tablet 25 mg  25 mg Oral Q8HRS Rhiannon Araya M.D.   25 mg at 04/22/20 0536   • fluticasone (FLONASE) nasal spray 100 mcg  2 Spray Nasal DAILY Dedra Mallory M.D.   100 mcg at 04/19/20 0536   • calcium carbonate (TUMS) chewable tab 1,000 mg  1,000 mg Oral Q6HRS PRN Rudy Adams M.D.   1,000 mg at 04/21/20 0221   • hyoscyamine-maalox plus-lidocaine viscous (GI COCKTAIL) oral susp 30 mL  30 mL Oral Once PRN Rudy Adams M.D.       • senna-docusate (PERICOLACE or SENOKOT S) 8.6-50 MG per tablet 2 Tab  2 Tab Oral BID Brandon Fajardo M.D.   2 Tab at 04/21/20 0605    And   • polyethylene glycol/lytes (MIRALAX) PACKET 1 Packet  1 Packet Oral QDAY PRN Brandon Fajardo M.D.        And   • magnesium hydroxide (MILK OF MAGNESIA) suspension 30 mL  30 mL Oral QDAY PRN Brandon Fajardo M.D.        And   • bisacodyl (DULCOLAX) suppository 10 mg  10 mg Rectal QDAY PRN Brandon Fajardo M.D.       • acetaminophen (TYLENOL) tablet 650 mg  650 mg Oral Q6HRS PRN Brandon Fajardo M.D.   650 mg at 04/22/20 0230   • Respiratory Therapy Consult   Nebulization Continuous RT Brandon Fajardo M.D.       • ondansetron (ZOFRAN ODT) dispertab 4 mg  4 mg Oral Q4HRS PRN Brandon Fajardo M.D.        Or   • ondansetron (ZOFRAN) syringe/vial injection 4 mg  4 mg Intravenous Q4HRS PRN Brandon Fajardo M.D.   4 mg at 04/18/20 1443       Fluids    Intake/Output Summary (Last 24 hours) at 4/22/2020 0651  Last data filed at 4/22/2020 0600  Gross per 24 hour   Intake 3186.5 ml   Output 2275 ml   Net 911.5 ml       Laboratory          Recent Labs      04/20/20  1500  04/21/20  0640 04/21/20  1415 04/21/20 2000 04/22/20  0200   SODIUM 123*   < > 124* 129* 130* 129*   POTASSIUM 3.8   < > 3.7 3.4* 4.3 3.6   CHLORIDE 86*   < > 90* 94* 99 96   CO2 23   < > 20 20 18* 20   BUN 19   < > 20 23* 22 19   CREATININE 0.79   < > 0.69 0.79 0.83 0.82   MAGNESIUM 2.0  --  2.0  --   --  2.1   PHOSPHORUS  --   --  3.0  --   --  2.3*   CALCIUM 8.9   < > 8.7 8.0* 7.9* 8.3*    < > = values in this interval not displayed.     Recent Labs     04/21/20  1415 04/21/20 2000 04/22/20  0200   GLUCOSE 114* 119* 115*     Recent Labs     04/20/20  0507 04/21/20 0450 04/22/20  0200   WBC 12.1* 11.5* 11.3*   NEUTSPOLYS  --  72.40* 69.60   LYMPHOCYTES  --  10.70* 12.20*   MONOCYTES  --  15.70* 16.80*   EOSINOPHILS  --  0.10 0.30   BASOPHILS  --  0.30 0.30     Recent Labs     04/19/20  0910 04/20/20  0507 04/21/20 0450 04/22/20  0200   RBC  --  5.40 5.32 5.46   HEMOGLOBIN  --  14.5 14.4 14.5   HEMATOCRIT  --  43.0 40.6* 43.6   PLATELETCT  --  289 248 286   IRON  --  30*  --   --    FERRITIN 530.0*  --   --   --    TOTIRONBC  --  206*  --   --        Imaging  CTA neck:  IMPRESSION:     Mild carotid and vertebral artery atherosclerosis without significant stenosis, aneurysm or occlusion     Mild bronchial wall thickening and groundglass could indicate edema. Reactive airways disease/infection is not excluded    CTA head:  IMPRESSION:     CT angiogram of the [place] is unable to identify source of subarachnoid hemorrhage     Slight new visualization of acute subarachnoid hemorrhage in the lateral ventricles and there is slight temporal lobe dilatation. Attention in follow-up is advised     Otherwise stable moderate acute subarachnoid hemorrhage filling the basal cisterns as above    Assessment/Plan  * Hyponatremia  Assessment & Plan  Return to ICU for tighter sodium management at the request neurosurgery 4/20  Improving  Low Na secondary to cerebral salt wasting from SAH?  Serial sodium  levels, every 6 hour lab  Goal sodium 135-145, increase sodium no greater than 9 mEq in 24 hours  Increase Salt tabs 4g TID  Current titration of  3% saline   Encourage electrolyte oral salt over free H20      Hypertensive emergency- (present on admission)  Assessment & Plan  Remains uncontrolled  Optimize oral antihypertensive regimen  Continue amlodipine 10mg this am  Nicardipine PRN  Hydralazine and labetalol prn  Coreg 12.5 BID  Hydralazine 25 q8  Increase Lisinopril 40mg daily    Non-aneurysmal perimesencephalic subarachnoid hemorrhage (HCC)- (present on admission)  Assessment & Plan  With no clear aneurysm demonstrated on CTA head/neck on 4/16  CT angio 4/17 without AVM or aneurysm, transferred to neurosurgery floor 4/18  Return to ICU 4/20 sodium and BP control per neurosurgery  Neurochecks every 4 hours, monitor for the need for more frequent  Monitor for the need for follow-up imaging studies, confer with neurosurgery daily  SBP goals <160  Goal sodium 135-145  Euglycemia  Pain control without oversedation  Nimotop stopped and patient transition to ACE inhibitor and beta-blocker while on neurosurgical floor as no indication for nimodipine at present as SAH not felt to be aneurysmal      Hypokalemia  Assessment & Plan  Replete to goal > 4, ERP  Serial BMP    Acute respiratory insufficiency  Assessment & Plan  Likely the result of IVF overload  Initially noted pt to be 6.5 liters (+)  Improved with forced diuresis, reassess daily  Mobilize and incentive spirometry    Alcohol abuse, daily use- (present on admission)  Assessment & Plan  Monitor for alcohol withdrawal   Replete electrolytes as needed, ERP  Vitamin supplementation as clinically appropriate    Class 1 obesity due to excess calories without serious comorbidity with body mass index (BMI) of 33.0 to 33.9 in adult- (present on admission)  Assessment & Plan  Weight loss to be encouraged as clinically appropriate  Monitor for CHARLES  TSH is  normal    Hypomagnesemia  Assessment & Plan  Replete to goal > 2, ERP  Serial magnesium level    Hyperlipidemia  Assessment & Plan  Weight loss to be encouraged  Diet  Statin when clinically appropriate       VTE:  Contraindicated  Ulcer: Not Indicated  Lines: None    I have performed a physical exam and reviewed and updated ROS and Plan today (4/22/2020). In review of yesterday's note (4/21/2020), there are no changes except as documented above.     Probable cerebral salt wasting. Patient transferred back per neurosurgery continue current titration of 3% saline. He required multiple titration of his antihypertensives and has been on and off a Cardene gtt.    Discussed patient condition and risk of morbidity and/or mortality with RN, Pharmacy, [facility] resident, Patient and neurosurgery     The patient remains critically ill.  Critical care time = 33 minutes in directly providing and coordinating critical care and extensive data review.  No time overlap and excludes procedures.

## 2020-04-22 NOTE — CARE PLAN
Problem: Communication  Goal: The ability to communicate needs accurately and effectively will improve  Outcome: PROGRESSING AS EXPECTED     Problem: Safety  Goal: Will remain free from injury  Outcome: PROGRESSING AS EXPECTED  Goal: Will remain free from falls  Outcome: PROGRESSING AS EXPECTED     Problem: Infection  Goal: Will remain free from infection  Outcome: PROGRESSING AS EXPECTED     Problem: Knowledge Deficit  Goal: Knowledge of disease process/condition, treatment plan, diagnostic tests, and medications will improve  Outcome: PROGRESSING AS EXPECTED  Goal: Knowledge of the prescribed therapeutic regimen will improve  Outcome: PROGRESSING AS EXPECTED     Problem: Pain Management  Goal: Pain level will decrease to patient's comfort goal  Outcome: PROGRESSING AS EXPECTED     Problem: Fluid Volume:  Goal: Will maintain balanced intake and output  Outcome: PROGRESSING AS EXPECTED

## 2020-04-22 NOTE — PROGRESS NOTES
[facility] Progress Note      Admit Date: 4/16/2020    Resident(s): Carmen Barker M.D.   Attending:  JOHN HOFFMAN/ Dr. Sorto    Patient ID:    Name:  J Luis Rae     YOB: 1960  Age:  60 y.o.  male   MRN:  [id number]    Hospital Course (carried forward and updated):  J Luis Rae is a 60 y.o. male with PMHx of HTN, initially transferred from [facility] 4/16/2020 with subarachnoid hemorrhage and hypertensive emergency.  Patient was admitted to ICU on nicardipine drip, new body pain and Vasotec.  He underwent cerebral angiogram did not show any evidence of aneurysm.  Was seen by neurosurgery, Dr. Randolph who recommended conservative management.  Patient was transferred to the floor on 4/18/2020 in order to start him on appropriate antihypertensive regimen for home, with goal SBP less than 160.  On the floor, off the nicardipine drip, patient demonstrated resistant hypertension with systolic blood pressure in 160s to 170s, while on hydrochlorothiazide, lisinopril, amlodipine and scheduled hydralazine.  Plasma renin and aldosterone levels were drawn and are currently pending.  Renal duplex ultrasound did not show any renal artery stenosis.  Patient sodium level dropped from 135->127.  He had received 1 dose of Lasix in the ICU and was on hydrochlorothiazide for 1 day.  Hydrochlorothiazide was discontinued in view of hyponatremia.  Neurosurgery was updated about patient's sodium levels and recommended starting patient on salt tablets 3 g/day with every 6 hours monitoring of sodium.  Patient's sodium continued to drop to 123.   Discussed with Neurosurgery PA, Daphne Magallanes, about drop in sodium from 127->123.  This update was discussed by neurosurgery PA with Dr. Randolph, who recommended transferring patient to the ICU for initiation of hypertonic saline protocol for concern of cerebral salt wasting.     Consultants:  Critical Care  Neurosurgery      Interval Events:  Good response to  "increased hypertonic saline at 40/h yesterday, then titrated back down to 30/h to avoid overcorrection. Sodium then plateaued, will increase back to 40/h and monitor. Will increase salt tabs to 4g TID w meals. No free water.  Still requiring nicardipine for BP control, BP seems to increase when hydralazine is due. Will increase lisinopril, frequency of hydralazine in attempt to wean nicardipine.  Good mentation, AAOx4. Unsteadiness with standing this am. Previously cleared for home by PT/OT, will request re-eval.      Review of Systems   Constitutional: Negative for chills and fever.   HENT: Negative for congestion and nosebleeds.         Nasal dryness   Eyes: Negative for blurred vision and double vision.        Dry eyes   Respiratory: Negative for cough and shortness of breath.    Cardiovascular: Negative for chest pain and palpitations.   Gastrointestinal: Negative for nausea and vomiting.   Genitourinary: Negative for dysuria and urgency.   Musculoskeletal: Negative for falls and myalgias.   Skin: Negative for itching and rash.   Neurological: Negative for dizziness and weakness.   Endo/Heme/Allergies: Positive for environmental allergies. Negative for polydipsia.   Psychiatric/Behavioral: Negative for depression and hallucinations. The patient is not nervous/anxious.        PHYSICAL EXAM:  Vitals:    04/22/20 0745 04/22/20 0800 04/22/20 0815 04/22/20 0830   BP: 156/73 157/69  106/57   Pulse: 73 82 66 81   Resp: 12      Temp:  36.6 °C (97.9 °F)     TempSrc:  Temporal     SpO2: 97% 93% 92%    Weight:       Height:        Body mass index is 32.18 kg/m².  Latest Vitals:  /57   Pulse 81   Temp 36.6 °C (97.9 °F) (Temporal)   Resp 12   Ht 1.93 m (6' 4\")   Wt 119.9 kg (264 lb 5.3 oz)   SpO2 92%   BMI 32.18 kg/m²   O2 therapy: Pulse Oximetry: 92 %, O2 (LPM): 1, O2 Delivery Device: Silicone Nasal Cannula  Vitals Range last 24h:  Temp:  [36.6 °C (97.9 °F)-37.7 °C (99.8 °F)] 36.6 °C (97.9 °F)  Pulse:  [63-82] " 81  Resp:  [12-99] 12  BP: (106-186)/(52-93) 106/57  SpO2:  [90 %-99 %] 92 %  Date 04/22/20 0700 - 04/23/20 0659   Shift 9947-5665 0214-9073 5767-9421 24 Hour Total   INTAKE   I.V. 31   31     Volume (mL) (3% sodium chloride (HYPERTONIC SALINE) 500mL infusion) 31   31   Shift Total 31   31   OUTPUT   Shift Total       NET 31   31        Intake/Output Summary (Last 24 hours) at 4/22/2020 0913  Last data filed at 4/22/2020 0702  Gross per 24 hour   Intake 3027.5 ml   Output 1800 ml   Net 1227.5 ml        Physical Exam   Constitutional: He is oriented to person, place, and time and well-developed, well-nourished, and in no distress.   obese   HENT:   Head: Normocephalic and atraumatic.   Eyes: Pupils are equal, round, and reactive to light. EOM are normal. No scleral icterus.   Neck: Normal range of motion. Neck supple.   Cardiovascular: Normal rate and regular rhythm.   Pulmonary/Chest: Effort normal and breath sounds normal. No respiratory distress. He has no wheezes. He has no rales.   Abdominal: Soft. There is no abdominal tenderness.   Musculoskeletal: Normal range of motion.         General: No tenderness.   Neurological: He is alert and oriented to person, place, and time.   Skin: Skin is warm and dry.   Psychiatric: Mood and affect normal.   Nursing note and vitals reviewed.          Recent Labs     04/20/20  1500  04/21/20  0640  04/21/20 2000 04/22/20  0200 04/22/20  0800   SODIUM 123*   < > 124*   < > 130* 129* 127*   POTASSIUM 3.8   < > 3.7   < > 4.3 3.6 3.5*   CHLORIDE 86*   < > 90*   < > 99 96 96   CO2 23   < > 20   < > 18* 20 19*   BUN 19   < > 20   < > 22 19 17   CREATININE 0.79   < > 0.69   < > 0.83 0.82 0.80   MAGNESIUM 2.0  --  2.0  --   --  2.1  --    PHOSPHORUS  --   --  3.0  --   --  2.3*  --    CALCIUM 8.9   < > 8.7   < > 7.9* 8.3* 7.9*    < > = values in this interval not displayed.     Recent Labs     04/21/20  2000 04/22/20  0200 04/22/20  0800   GLUCOSE 119* 115* 115*     Recent Labs      04/20/20  0507 04/21/20  0450 04/22/20  0200   RBC 5.40 5.32 5.46   HEMOGLOBIN 14.5 14.4 14.5   HEMATOCRIT 43.0 40.6* 43.6   PLATELETCT 289 248 286   IRON 30*  --   --    TOTIRONBC 206*  --   --      Recent Labs     04/20/20  0507 04/21/20  0450 04/22/20  0200   WBC 12.1* 11.5* 11.3*   NEUTSPOLYS  --  72.40* 69.60   LYMPHOCYTES  --  10.70* 12.20*   MONOCYTES  --  15.70* 16.80*   EOSINOPHILS  --  0.10 0.30   BASOPHILS  --  0.30 0.30       Meds:  • labetalol  10 mg     • hydrALAZINE  10 mg     • potassium phosphate ivpb  15 mmol 15 mmol (04/22/20 0904)   • [START ON 4/23/2020] lisinopril  40 mg     • lisinopril  20 mg     • hydrALAZINE  25 mg     • MD Alert...Adult ICU Electrolyte Replacement per Pharmacy       • sodium chloride  2 g     • carboxymethylcellulose  1-2 Drop     • sodium chloride  2 Spray     • amLODIPine  10 mg     • carvedilol  12.5 mg     • hydrOXYzine HCl  50 mg     • 3% sodium chloride   40 mL/hr at 04/22/20 0836   • niCARdipine infusion  0-15 mg/hr 5 mg/hr (04/22/20 0815)   • enalaprilat  0.625 mg     • fluticasone  2 Spray     • calcium carbonate  1,000 mg     • hyoscyamine-maalox plus-lidocaine viscous  30 mL     • senna-docusate  2 Tab      And   • polyethylene glycol/lytes  1 Packet      And   • magnesium hydroxide  30 mL      And   • bisacodyl  10 mg     • acetaminophen  650 mg     • Respiratory Therapy Consult       • ondansetron  4 mg      Or   • ondansetron  4 mg          Procedures:  4/17/2020 Cerebral angiogram    Imaging:  US-RENAL ARTERY DUPLEX COMP   Final Result      IR-CAROTID-CEREBRAL BILATERAL   Final Result         1.  Normal cerebral arteriogram. No evidence of aneurysm.   2.  The origin of the left posterior inferior cerebellar artery is cut off on the lateral view and largely obscured on the AP view due to overlapping structures. This was not noticed during the procedure, only at the time of final interpretation.    Attention to this location on follow-up CTA or MRA is  recommended.      CT-CTA NECK WITH & W/O-POST PROCESSING   Final Result      Mild carotid and vertebral artery atherosclerosis without significant stenosis, aneurysm or occlusion      Mild bronchial wall thickening and groundglass could indicate edema. Reactive airways disease/infection is not excluded      CT-CTA HEAD WITH & W/O-POST PROCESS   Final Result      CT angiogram of the [place] is unable to identify source of subarachnoid hemorrhage      Slight new visualization of acute subarachnoid hemorrhage in the lateral ventricles and there is slight temporal lobe dilatation. Attention in follow-up is advised      Otherwise stable moderate acute subarachnoid hemorrhage filling the basal cisterns as above      OUTSIDE IMAGES-CT HEAD   Final Result          Problem and Plan:  * Hyponatremia  Assessment & Plan  2/2 cerebral salt wasting syndrome. Stabilized but little improvement on hypertonic saline.  -Continue salt tabs 4g TID  -Increase hypertonic saline rate  -Continue free water restriction  -Close monitoring  -NSG following, appreciate assistance    Hypertensive emergency- (present on admission)  Assessment & Plan  The patient initially presented with a BP of 188/135. This may be contributing to SAH.   Continued hypertension requiring nicardipine, titration of oral antihypertensives.  Renal artery duplex was negative for renal artery stenosis.  Patient also has anxiety, and suffers from insomnia.  This could potentially be contribute to his high blood pressure. Wife reports that he had been in a heated argument on the phone directly prior to onset of symptoms.  He does not want to take any narcotics, sedatives.  He also has a history of 2-3 drinks, hard liquor every day.  - Titrate oral antihypertensives: increase lisinopril dose, hydralazine frequency  - Continue nicardipine gtt for goal SBP<160 per neurosurgery, wean as tolerated  - Follow plasma renin, aldosterone  - Close blood pressure  monitoring    Non-aneurysmal perimesencephalic subarachnoid hemorrhage (HCC)- (present on admission)  Assessment & Plan  Presented on 2020 with neck stiffness, headaches, and nausea/vomiting; wife reports that he had been in a heated argument on the phone directly prior to onset of symptoms. Non-con CT showed a SAH. Subsequent CTA did not show an aneurysm or source of SAH (although the patient does have a family history of aneurysms). He does have a history of hypertension, but was not on any anti-hypertensive medications prior to admission. He is on aspirin but no other anti-coagulation.  Cerebral Angiography: negative for aneurysm  C/b cerebral salt wasting syndrome.  [employer]  No PT OT needs identified.  - Elevate head of bed  - fall/seizure/aspiration precautions  - Hold aspirin   - q4h neuro-checks  - SBP <160 per neurosurgery  - Neurosurgery following, appreciate recommendations  - Repeat PT/ OT eval  - Repeat CTA in 6 weeks, patient will follow in [place] with [facility]    Hypokalemia  Assessment & Plan  Monitor and replete prn    Acute respiratory insufficiency  Assessment & Plan  Resolved.  Was + 6L since admit, s/p Lasix 20mg IV.  - Satting well on RA    Alcohol abuse, daily use- (present on admission)  Assessment & Plan  The patient drinks alcohol daily (usually hard liquor, 2 to 3 glasses) and uses marijuana occasionally.   No evidence of withdrawal this admission.   - cessation counseling and education provided    Hypophosphatemia  Assessment & Plan  Monitor and replete prn    At risk for obstructive sleep apnea- (present on admission)  Assessment & Plan  STOP BANG score 7/8.  -Outpatient sleep study is indicated    Hypomagnesemia  Assessment & Plan  Monitor and replete prn    Pre-diabetes- (present on admission)  Assessment & Plan  A1c was 6.3.   - Continue SSI and hypoglycemia protocol  - Consider lifestyle modification counseling prior to  discharge    Hyperlipidemia  Assessment & Plan  The patient's LDL is elevated at 143 and total cholesterol is 209.   - Start statin when clinically appropriate      DISPO: continue ICU for nicardipine, hypertonic saline until no longer required    CODE STATUS: FULL    Quality Measures:  Feeding: regular diet, no free water  Analgesia: prn acetaminophen  Sedation: prn hydroxyzine   Thromboprophylaxis: SCDs, pharmacologic ppx contraindicated 2/2 intracranial bleeding  Head of bed: >30 degrees  Ulcer prophylaxis: n/a  Glycemic control: SSI  Bowel care: bowel regimen  Indwelling lines: PIVs  Deescalation of antibiotics: n/a      Carmen Barker M.D.

## 2020-04-22 NOTE — PROGRESS NOTES
Spoke with MD resident Morris on the telephone. MD made aware of sodium of 130. MD stated to keep at the same rate and recheck at 0200.

## 2020-04-23 PROBLEM — R06.89 ACUTE RESPIRATORY INSUFFICIENCY: Status: RESOLVED | Noted: 2020-04-18 | Resolved: 2020-04-23

## 2020-04-23 PROBLEM — R51.9 HEADACHE: Status: ACTIVE | Noted: 2020-04-23

## 2020-04-23 LAB
ANION GAP SERPL CALC-SCNC: 12 MMOL/L (ref 7–16)
ANION GAP SERPL CALC-SCNC: 12 MMOL/L (ref 7–16)
ANION GAP SERPL CALC-SCNC: 13 MMOL/L (ref 7–16)
ANION GAP SERPL CALC-SCNC: 14 MMOL/L (ref 7–16)
BASOPHILS # BLD AUTO: 0.4 % (ref 0–1.8)
BASOPHILS # BLD: 0.05 K/UL (ref 0–0.12)
BUN SERPL-MCNC: 11 MG/DL (ref 8–22)
BUN SERPL-MCNC: 12 MG/DL (ref 8–22)
BUN SERPL-MCNC: 12 MG/DL (ref 8–22)
BUN SERPL-MCNC: 14 MG/DL (ref 8–22)
CALCIUM SERPL-MCNC: 7.8 MG/DL (ref 8.5–10.5)
CALCIUM SERPL-MCNC: 7.9 MG/DL (ref 8.5–10.5)
CALCIUM SERPL-MCNC: 7.9 MG/DL (ref 8.5–10.5)
CALCIUM SERPL-MCNC: 8.1 MG/DL (ref 8.5–10.5)
CHLORIDE SERPL-SCNC: 97 MMOL/L (ref 96–112)
CHLORIDE SERPL-SCNC: 98 MMOL/L (ref 96–112)
CHLORIDE SERPL-SCNC: 99 MMOL/L (ref 96–112)
CHLORIDE SERPL-SCNC: 99 MMOL/L (ref 96–112)
CO2 SERPL-SCNC: 18 MMOL/L (ref 20–33)
CO2 SERPL-SCNC: 19 MMOL/L (ref 20–33)
CO2 SERPL-SCNC: 19 MMOL/L (ref 20–33)
CO2 SERPL-SCNC: 21 MMOL/L (ref 20–33)
CREAT SERPL-MCNC: 0.73 MG/DL (ref 0.5–1.4)
CREAT SERPL-MCNC: 0.74 MG/DL (ref 0.5–1.4)
CREAT SERPL-MCNC: 0.74 MG/DL (ref 0.5–1.4)
CREAT SERPL-MCNC: 0.82 MG/DL (ref 0.5–1.4)
EOSINOPHIL # BLD AUTO: 0.05 K/UL (ref 0–0.51)
EOSINOPHIL NFR BLD: 0.4 % (ref 0–6.9)
ERYTHROCYTE [DISTWIDTH] IN BLOOD BY AUTOMATED COUNT: 38.5 FL (ref 35.9–50)
GLUCOSE SERPL-MCNC: 124 MG/DL (ref 65–99)
GLUCOSE SERPL-MCNC: 135 MG/DL (ref 65–99)
GLUCOSE SERPL-MCNC: 137 MG/DL (ref 65–99)
GLUCOSE SERPL-MCNC: 144 MG/DL (ref 65–99)
HCT VFR BLD AUTO: 39 % (ref 42–52)
HGB BLD-MCNC: 13 G/DL (ref 14–18)
IMM GRANULOCYTES # BLD AUTO: 0.07 K/UL (ref 0–0.11)
IMM GRANULOCYTES NFR BLD AUTO: 0.6 % (ref 0–0.9)
LYMPHOCYTES # BLD AUTO: 1.24 K/UL (ref 1–4.8)
LYMPHOCYTES NFR BLD: 10.4 % (ref 22–41)
MAGNESIUM SERPL-MCNC: 1.9 MG/DL (ref 1.5–2.5)
MCH RBC QN AUTO: 26.6 PG (ref 27–33)
MCHC RBC AUTO-ENTMCNC: 33.3 G/DL (ref 33.7–35.3)
MCV RBC AUTO: 79.9 FL (ref 81.4–97.8)
MONOCYTES # BLD AUTO: 2.06 K/UL (ref 0–0.85)
MONOCYTES NFR BLD AUTO: 17.2 % (ref 0–13.4)
NEUTROPHILS # BLD AUTO: 8.48 K/UL (ref 1.82–7.42)
NEUTROPHILS NFR BLD: 71 % (ref 44–72)
NRBC # BLD AUTO: 0 K/UL
NRBC BLD-RTO: 0 /100 WBC
PHOSPHATE SERPL-MCNC: 2.3 MG/DL (ref 2.5–4.5)
PLATELET # BLD AUTO: 283 K/UL (ref 164–446)
PMV BLD AUTO: 9 FL (ref 9–12.9)
POTASSIUM SERPL-SCNC: 3.6 MMOL/L (ref 3.6–5.5)
POTASSIUM SERPL-SCNC: 3.7 MMOL/L (ref 3.6–5.5)
RBC # BLD AUTO: 4.88 M/UL (ref 4.7–6.1)
SODIUM SERPL-SCNC: 128 MMOL/L (ref 135–145)
SODIUM SERPL-SCNC: 129 MMOL/L (ref 135–145)
SODIUM SERPL-SCNC: 131 MMOL/L (ref 135–145)
SODIUM SERPL-SCNC: 133 MMOL/L (ref 135–145)
WBC # BLD AUTO: 12 K/UL (ref 4.8–10.8)

## 2020-04-23 PROCEDURE — 80048 BASIC METABOLIC PNL TOTAL CA: CPT | Mod: 91

## 2020-04-23 PROCEDURE — A9270 NON-COVERED ITEM OR SERVICE: HCPCS | Performed by: PSYCHIATRY & NEUROLOGY

## 2020-04-23 PROCEDURE — 83735 ASSAY OF MAGNESIUM: CPT

## 2020-04-23 PROCEDURE — A9270 NON-COVERED ITEM OR SERVICE: HCPCS | Performed by: STUDENT IN AN ORGANIZED HEALTH CARE EDUCATION/TRAINING PROGRAM

## 2020-04-23 PROCEDURE — 700105 HCHG RX REV CODE 258: Performed by: PSYCHIATRY & NEUROLOGY

## 2020-04-23 PROCEDURE — 770006 HCHG ROOM/CARE - MED/SURG/GYN SEMI*

## 2020-04-23 PROCEDURE — 700102 HCHG RX REV CODE 250 W/ 637 OVERRIDE(OP): Performed by: INTERNAL MEDICINE

## 2020-04-23 PROCEDURE — 700105 HCHG RX REV CODE 258: Performed by: INTERNAL MEDICINE

## 2020-04-23 PROCEDURE — 700111 HCHG RX REV CODE 636 W/ 250 OVERRIDE (IP): Performed by: PSYCHIATRY & NEUROLOGY

## 2020-04-23 PROCEDURE — 700102 HCHG RX REV CODE 250 W/ 637 OVERRIDE(OP): Performed by: STUDENT IN AN ORGANIZED HEALTH CARE EDUCATION/TRAINING PROGRAM

## 2020-04-23 PROCEDURE — 85025 COMPLETE CBC W/AUTO DIFF WBC: CPT

## 2020-04-23 PROCEDURE — 700101 HCHG RX REV CODE 250: Performed by: PSYCHIATRY & NEUROLOGY

## 2020-04-23 PROCEDURE — 36415 COLL VENOUS BLD VENIPUNCTURE: CPT

## 2020-04-23 PROCEDURE — 700101 HCHG RX REV CODE 250: Performed by: INTERNAL MEDICINE

## 2020-04-23 PROCEDURE — 99233 SBSQ HOSP IP/OBS HIGH 50: CPT | Performed by: PSYCHIATRY & NEUROLOGY

## 2020-04-23 PROCEDURE — A9270 NON-COVERED ITEM OR SERVICE: HCPCS | Performed by: INTERNAL MEDICINE

## 2020-04-23 PROCEDURE — 700102 HCHG RX REV CODE 250 W/ 637 OVERRIDE(OP): Performed by: PSYCHIATRY & NEUROLOGY

## 2020-04-23 PROCEDURE — 84100 ASSAY OF PHOSPHORUS: CPT

## 2020-04-23 RX ORDER — HYDRALAZINE HYDROCHLORIDE 25 MG/1
25 TABLET, FILM COATED ORAL EVERY 6 HOURS
Status: DISCONTINUED | OUTPATIENT
Start: 2020-04-23 | End: 2020-04-26

## 2020-04-23 RX ORDER — HYDRALAZINE HYDROCHLORIDE 50 MG/1
50 TABLET, FILM COATED ORAL EVERY 6 HOURS
Status: DISCONTINUED | OUTPATIENT
Start: 2020-04-23 | End: 2020-04-23

## 2020-04-23 RX ORDER — CARVEDILOL 6.25 MG/1
12.5 TABLET ORAL ONCE
Status: COMPLETED | OUTPATIENT
Start: 2020-04-23 | End: 2020-04-23

## 2020-04-23 RX ORDER — FLUDROCORTISONE ACETATE 0.1 MG/1
0.1 TABLET ORAL EVERY MORNING
Status: DISCONTINUED | OUTPATIENT
Start: 2020-04-23 | End: 2020-04-27

## 2020-04-23 RX ORDER — CARVEDILOL 6.25 MG/1
25 TABLET ORAL 2 TIMES DAILY WITH MEALS
Status: DISCONTINUED | OUTPATIENT
Start: 2020-04-23 | End: 2020-04-28 | Stop reason: HOSPADM

## 2020-04-23 RX ORDER — MAGNESIUM SULFATE HEPTAHYDRATE 40 MG/ML
2 INJECTION, SOLUTION INTRAVENOUS ONCE
Status: COMPLETED | OUTPATIENT
Start: 2020-04-23 | End: 2020-04-23

## 2020-04-23 RX ORDER — ACETAMINOPHEN 325 MG/1
650 TABLET ORAL EVERY 4 HOURS PRN
Status: DISCONTINUED | OUTPATIENT
Start: 2020-04-23 | End: 2020-04-28 | Stop reason: HOSPADM

## 2020-04-23 RX ADMIN — CARVEDILOL 12.5 MG: 6.25 TABLET, FILM COATED ORAL at 10:10

## 2020-04-23 RX ADMIN — Medication 4 G: at 17:49

## 2020-04-23 RX ADMIN — HYDRALAZINE HYDROCHLORIDE 25 MG: 25 TABLET, FILM COATED ORAL at 17:50

## 2020-04-23 RX ADMIN — HYDRALAZINE HYDROCHLORIDE 25 MG: 25 TABLET, FILM COATED ORAL at 05:31

## 2020-04-23 RX ADMIN — LISINOPRIL 40 MG: 20 TABLET ORAL at 08:35

## 2020-04-23 RX ADMIN — POTASSIUM PHOSPHATE, MONOBASIC AND POTASSIUM PHOSPHATE, DIBASIC 15 MMOL: 224; 236 INJECTION, SOLUTION, CONCENTRATE INTRAVENOUS at 08:36

## 2020-04-23 RX ADMIN — MAGNESIUM SULFATE HEPTAHYDRATE 2 G: 40 INJECTION, SOLUTION INTRAVENOUS at 08:37

## 2020-04-23 RX ADMIN — ACETAMINOPHEN 650 MG: 325 TABLET, FILM COATED ORAL at 02:36

## 2020-04-23 RX ADMIN — SODIUM CHLORIDE: 3 INJECTION, SOLUTION INTRAVENOUS at 17:43

## 2020-04-23 RX ADMIN — FLUDROCORTISONE ACETATE 0.1 MG: 0.1 TABLET ORAL at 08:35

## 2020-04-23 RX ADMIN — Medication 4 G: at 05:31

## 2020-04-23 RX ADMIN — SODIUM CHLORIDE 10 MG/HR: 9 INJECTION, SOLUTION INTRAVENOUS at 01:42

## 2020-04-23 RX ADMIN — ACETAMINOPHEN 650 MG: 325 TABLET, FILM COATED ORAL at 15:26

## 2020-04-23 RX ADMIN — CARVEDILOL 25 MG: 6.25 TABLET, FILM COATED ORAL at 17:49

## 2020-04-23 RX ADMIN — CARVEDILOL 12.5 MG: 6.25 TABLET, FILM COATED ORAL at 06:31

## 2020-04-23 RX ADMIN — AMLODIPINE BESYLATE 10 MG: 10 TABLET ORAL at 05:30

## 2020-04-23 RX ADMIN — CARBOXYMETHYLCELLULOSE SODIUM 2 DROP: 5 SOLUTION/ DROPS OPHTHALMIC at 22:36

## 2020-04-23 RX ADMIN — SODIUM CHLORIDE 5 MG/HR: 9 INJECTION, SOLUTION INTRAVENOUS at 06:17

## 2020-04-23 RX ADMIN — SODIUM CHLORIDE: 3 INJECTION, SOLUTION INTRAVENOUS at 05:28

## 2020-04-23 RX ADMIN — ACETAMINOPHEN 650 MG: 325 TABLET, FILM COATED ORAL at 08:35

## 2020-04-23 RX ADMIN — HYDRALAZINE HYDROCHLORIDE 25 MG: 25 TABLET, FILM COATED ORAL at 12:06

## 2020-04-23 RX ADMIN — CARBOXYMETHYLCELLULOSE SODIUM 2 DROP: 5 SOLUTION/ DROPS OPHTHALMIC at 02:37

## 2020-04-23 RX ADMIN — ACETAMINOPHEN 650 MG: 325 TABLET, FILM COATED ORAL at 21:07

## 2020-04-23 RX ADMIN — Medication 4 G: at 12:06

## 2020-04-23 ASSESSMENT — ENCOUNTER SYMPTOMS
FALLS: 0
NECK PAIN: 0
PHOTOPHOBIA: 0
PALPITATIONS: 0
HEMOPTYSIS: 0
SPEECH CHANGE: 0
DEPRESSION: 0
MYALGIAS: 0
BLURRED VISION: 0
FEVER: 0
FOCAL WEAKNESS: 0
HALLUCINATIONS: 0
SEIZURES: 0
COUGH: 0
DOUBLE VISION: 0
POLYDIPSIA: 0
NERVOUS/ANXIOUS: 0
SINUS PAIN: 0
BRUISES/BLEEDS EASILY: 0
BACK PAIN: 0
EYE PAIN: 0
SENSORY CHANGE: 0
DIARRHEA: 0
CHILLS: 0
VOMITING: 0
SHORTNESS OF BREATH: 0
WEAKNESS: 0
DIZZINESS: 0
NAUSEA: 0
HEADACHES: 1

## 2020-04-23 ASSESSMENT — FIBROSIS 4 INDEX: FIB4 SCORE: 0.54

## 2020-04-23 NOTE — PROGRESS NOTES
Neurosurgery Progress Note    Subjective:  Hospital Day #8 SAH with severe headache  CTA/IR cerebral angio negative for aneurysm  BP control much improved  Serial scans show stable SAH  Was transferred back to ICU on  for hypertonic saline treatment for cerebral Na wasting.  Na 129 this AM, no on hypertonic saline at 50/hr  Stable HA  Renal Artery US  normal    Exam:  GCS 15  No drift,   Motor 5/5 and symmetric  Appropriate   EOM intact, pupils equal   Very anxious     BP  Min: 106/57  Max: 173/77  Pulse  Av.7  Min: 63  Max: 84  Resp  Av.9  Min: 9  Max: 48  Temp  Av.7 °C (98.1 °F)  Min: 36.4 °C (97.5 °F)  Max: 37.1 °C (98.7 °F)  SpO2  Av.3 %  Min: 91 %  Max: 98 %    No data recorded    Recent Labs     20  0450 20  0200 20  0230   WBC 11.5* 11.3* 12.0*   RBC 5.32 5.46 4.88   HEMOGLOBIN 14.4 14.5 13.0*   HEMATOCRIT 40.6* 43.6 39.0*   MCV 76.3* 79.9* 79.9*   MCH 27.1 26.6* 26.6*   MCHC 35.5* 33.3* 33.3*   RDW 36.2 38.7 38.5   PLATELETCT 248 286 283   MPV 8.9* 8.9* 9.0     Recent Labs     20  1356 20  2000 20  0230   SODIUM 128* 130* 129*   POTASSIUM 3.5* 3.6 3.6   CHLORIDE 98 99 99   CO2 18* 17* 18*   GLUCOSE 130* 147* 124*   BUN 17 15 12   CREATININE 0.82 0.74 0.74   CALCIUM 7.7* 7.5* 7.8*               Intake/Output       20 - 20 0659 20 - 20 0659       Total  Total       Intake    P.O.  1000  375 1375  --  -- --    P.O. 4214 921 7387 -- -- --    I.V.  1482.4  1902.9 3385.3  --  -- --    Cardene Volume 997.1 1302.9 2300 -- -- --    Volume (mL) (3% sodium chloride (HYPERTONIC SALINE) 500mL infusion) 485.3 600 1085.3 -- -- --    IV Piggyback  249.9  249.9 499.8  --  -- --    Volume (mL) (potassium phosphates 15 mmol in D5W 250 mL ivpb) 249.9 0 249.9 -- -- --    Volume (mL) (potassium phosphates 15 mmol in D5W 250 mL ivpb) -- 249.9 249.9 -- -- --    Total Intake 2732.3 2527.8 5260.1 -- --  --       Output    Urine  875  2975 3850  --  -- --    Number of Times Voided 1 x 1 x 2 x -- -- --    Urine Void (mL) 875 2975 3850 -- -- --    Stool  --  -- --  --  -- --    Number of Times Stooled 1 x -- 1 x -- -- --    Total Output 875 2975 3850 -- -- --       Net I/O     1857.3 -447.2 1410.1 -- -- --            Intake/Output Summary (Last 24 hours) at 4/23/2020 0812  Last data filed at 4/23/2020 0630  Gross per 24 hour   Intake 5229.12 ml   Output 3850 ml   Net 1379.12 ml            • potassium phosphate ivpb  15 mmol Once   • magnesium sulfate  2 g Once   • fludrocortisone  0.1 mg QAM   • carvedilol  25 mg BID WITH MEALS   • carvedilol  12.5 mg Once   • labetalol  10 mg Q4HRS PRN   • hydrALAZINE  10 mg Q4HRS PRN   • lisinopril  40 mg Q DAY   • hydrALAZINE  25 mg Q6HRS   • fluticasone  2 Spray QDAY PRN   • sodium chloride  4 g TID WITH MEALS   • MD Alert...Adult ICU Electrolyte Replacement per Pharmacy   PHARMACY TO DOSE   • carboxymethylcellulose  1-2 Drop PRN   • sodium chloride  2 Spray Q2HRS PRN   • amLODIPine  10 mg Q DAY   • hydrOXYzine HCl  50 mg TID PRN   • 3% sodium chloride   Continuous   • enalaprilat  0.625 mg Q6HRS PRN   • calcium carbonate  1,000 mg Q6HRS PRN   • hyoscyamine-maalox plus-lidocaine viscous  30 mL Once PRN   • senna-docusate  2 Tab BID    And   • polyethylene glycol/lytes  1 Packet QDAY PRN    And   • magnesium hydroxide  30 mL QDAY PRN    And   • bisacodyl  10 mg QDAY PRN   • acetaminophen  650 mg Q6HRS PRN   • Respiratory Therapy Consult   Continuous RT   • ondansetron  4 mg Q4HRS PRN    Or   • ondansetron  4 mg Q4HRS PRN       Assessment and Plan:  Hospital day #8  Non-aneurysmal SAH  Appreciate hospitalist/Intensivist care.  SBP to be <160mmHg  Na goals of 135-145  Q 4hr neuro checks   PT/OT  Xanax prn for anxiety  Pt needs repeat CTA in 6 weeks  Will f/u in [place] with [facility]   Answered all questions  Will continue to follow       Prophylactic anticoagulation: no          Start date/time:

## 2020-04-23 NOTE — PROGRESS NOTES
2 RN skin check completed with Cholo RN  - Bilateral ears, shoulders, elbows, and heels CDI  - Sacrum pink, blanching

## 2020-04-23 NOTE — PROGRESS NOTES
Patient seen and assessed at bedside.  60-year-old male with subarachnoid hemorrhage, transferred back to ICU from floor on 4/20/2020 in view of hyponatremia secondary to cerebral salt wasting, to be started on hypertonic saline.  Patient continues to be on hypertonic saline, salt tablets and fludrocortisone.  He is often nicardipine drip and is managed with oral antihypertensives.  Patient accepted to floor, we will monitor his sodium levels and titrate hypertonic saline.  Will titrate antihypertensives as needed.  Will follow neurosurgery recommendations.

## 2020-04-23 NOTE — PROGRESS NOTES
2150 - MD called, Na reported - will continue gtt at current rate and give K replacement  0400 - MD called, Na reported - will continue gtt at current rate

## 2020-04-23 NOTE — PROGRESS NOTES
Critical Care Progress Note    Date of admission  4/16/2020    Chief Complaint  60 y.o. male admitted 4/16/2020 with spontaneous SAH    Hospital Course    Mr. Rae is a pleasant 60 year old male with the past medical history of hypertension who developed sudden onset of neck stiffness and posterior severe HA with nausea and vomiting around 5 pm on 4/16.  He was seen at [facility] where a non-contrast CT head revealed a SAH.  He was transferred to [facility] and underwent CTA head/neck which did not show an aneurysm or AVM.  He was started on nicardipine drip for SBP goals <140.      Interval Problem Update  Reviewed last 24 hour events:  - Tx back to ICU for probable cerebral salt wasting 2/2 SAH  - HA tylenol   - Neuro: GCS 15; HA   - HR: 60-80s   - SBP: 130-160s   - GI: Advance diet as tolerated   - UOP: 3.8L   - + BM this am   - Tm: 37.1   - Lines: PIVs   - PPx: GI not indicated, DVT SCDs   - Na 129   - Current titration of 3% and nicardipine   - adjusting oral hypertensives   - added florinef this am      Review of Systems  Review of Systems   Constitutional: Negative for chills, fever and malaise/fatigue.   HENT: Negative for congestion, ear pain and sinus pain.    Eyes: Negative for blurred vision, double vision, photophobia and pain.   Respiratory: Negative for cough, hemoptysis and shortness of breath.    Cardiovascular: Negative for chest pain, palpitations and leg swelling.   Gastrointestinal: Negative for diarrhea, nausea and vomiting.   Genitourinary: Negative for frequency, hematuria and urgency.   Musculoskeletal: Negative for back pain and neck pain.   Skin: Negative for rash.   Neurological: Positive for headaches. Negative for sensory change, speech change, focal weakness, seizures and weakness.   Endo/Heme/Allergies: Does not bruise/bleed easily.   Psychiatric/Behavioral: Negative for depression. The patient is not nervous/anxious.    Sleep: Negative for EDS, snoring and apnea    Vital Signs  for last 24 hours   Temp:  [36.4 °C (97.5 °F)-37.1 °C (98.7 °F)] 36.6 °C (97.8 °F)  Pulse:  [63-84] 84  Resp:  [9-48] 25  BP: (106-173)/(56-80) 140/63  SpO2:  [91 %-98 %] 95 %    Hemodynamic parameters for last 24 hours       Respiratory Information for the last 24 hours       Physical Exam   Physical Exam  Vitals signs and nursing note reviewed.   Constitutional:       General: He is not in acute distress.     Appearance: Normal appearance. He is obese. He is not ill-appearing or toxic-appearing.   HENT:      Head: Normocephalic and atraumatic.      Right Ear: External ear normal.      Left Ear: External ear normal.      Nose: Nose normal. No rhinorrhea.      Mouth/Throat:      Mouth: Mucous membranes are moist.      Pharynx: Oropharynx is clear. No oropharyngeal exudate.   Eyes:      General: No scleral icterus.     Extraocular Movements: Extraocular movements intact.      Conjunctiva/sclera: Conjunctivae normal.      Pupils: Pupils are equal, round, and reactive to light.   Neck:      Musculoskeletal: Normal range of motion.   Cardiovascular:      Rate and Rhythm: Normal rate and regular rhythm.      Pulses: Normal pulses.   Pulmonary:      Effort: Pulmonary effort is normal. No respiratory distress.      Breath sounds: Normal breath sounds. No wheezing or rhonchi.   Chest:      Chest wall: No tenderness.   Abdominal:      General: Abdomen is flat. Bowel sounds are normal. There is no distension.      Palpations: Abdomen is soft.      Tenderness: There is no abdominal tenderness. There is no right CVA tenderness, left CVA tenderness, guarding or rebound.   Musculoskeletal: Normal range of motion.         General: No swelling.      Right lower leg: No edema.      Left lower leg: No edema.   Skin:     General: Skin is warm and dry.      Capillary Refill: Capillary refill takes less than 2 seconds.      Coloration: Skin is not cyanotic.      Findings: No rash.      Nails: There is no clubbing.     Neurological:       General: No focal deficit present.      Mental Status: He is alert and oriented to person, place, and time.      GCS: GCS eye subscore is 4. GCS verbal subscore is 5. GCS motor subscore is 6.      Cranial Nerves: No cranial nerve deficit.      Sensory: No sensory deficit.      Motor: Motor function is intact. No weakness.      Coordination: Coordination is intact.      Comments: GCS 15. Full strength throughout. No ataxia   Psychiatric:         Mood and Affect: Mood normal.         Speech: Speech normal.         Behavior: Behavior normal.         Thought Content: Thought content normal.         Judgment: Judgment normal.         Medications  Current Facility-Administered Medications   Medication Dose Route Frequency Provider Last Rate Last Dose   • potassium phosphates 15 mmol in D5W 250 mL ivpb  15 mmol Intravenous Once Frank Sorto M.D.       • magnesium sulfate IVPB premix 2 g  2 g Intravenous Once Frank Sorto M.D.       • labetalol (NORMODYNE/TRANDATE) injection 10 mg  10 mg Intravenous Q4HRS PRN Frank Sorto M.D.   10 mg at 04/22/20 1136   • hydrALAZINE (APRESOLINE) injection 10 mg  10 mg Intravenous Q4HRS PRN Frank Sorto M.D.   10 mg at 04/22/20 1206   • lisinopril (PRINIVIL) tablet 40 mg  40 mg Oral Q DAY Carmen Barker M.D.   40 mg at 04/22/20 1350   • hydrALAZINE (APRESOLINE) tablet 25 mg  25 mg Oral Q6HRS Carmen Barker M.D.   25 mg at 04/23/20 0531   • fluticasone (FLONASE) nasal spray 100 mcg  2 Spray Nasal QDAY PRN Frank Sorto M.D.       • sodium chloride (SALT) tablet 4 g  4 g Oral TID WITH MEALS Carmen Barker M.D.   4 g at 04/23/20 0531   • MD Alert...ICU Electrolyte Replacement per Pharmacy   Other PHARMACY TO DOSE Frank Sorto M.D.       • carboxymethylcellulose (REFRESH TEARS) 0.5 % ophthalmic drops 1-2 Drop  1-2 Drop Both Eyes PRN Carmen Barker M.D.   2 Drop at 04/23/20 0237   • sodium chloride (OCEAN) 0.65 % nasal spray 2 Spray  2 Spray Nasal Q2HRS PRN Carmen M  LORENA Barker   2 Amigo at 04/22/20 1153   • amLODIPine (NORVASC) tablet 10 mg  10 mg Oral Q DAY Carmen Barker M.D.   10 mg at 04/23/20 0530   • carvedilol (COREG) tablet 12.5 mg  12.5 mg Oral BID WITH MEALS Igor Macias M.D.   12.5 mg at 04/23/20 0631   • hydrOXYzine HCl (ATARAX) tablet 50 mg  50 mg Oral TID PRN Rhiannon Araya M.D.   50 mg at 04/21/20 0333   • 3% sodium chloride (HYPERTONIC SALINE) 500mL infusion   Intravenous Continuous Carmen Barker M.D. 50 mL/hr at 04/23/20 0715     • niCARdipine (CARDENE) 25 mg in  mL Infusion  0-15 mg/hr Intravenous Continuous Carmen Barker M.D. 50 mL/hr at 04/23/20 0617 5 mg/hr at 04/23/20 0617   • enalaprilat (VASOTEC) injection 0.625 mg  0.625 mg Intravenous Q6HRS PRN Ezra Alvarado M.D.   0.625 mg at 04/22/20 1148   • calcium carbonate (TUMS) chewable tab 1,000 mg  1,000 mg Oral Q6HRS PRN Rudy Adams M.D.   1,000 mg at 04/21/20 0221   • hyoscyamine-maalox plus-lidocaine viscous (GI COCKTAIL) oral susp 30 mL  30 mL Oral Once PRN Rudy Adams M.D.       • senna-docusate (PERICOLACE or SENOKOT S) 8.6-50 MG per tablet 2 Tab  2 Tab Oral BID Brandon Fajardo M.D.   Stopped at 04/22/20 1800    And   • polyethylene glycol/lytes (MIRALAX) PACKET 1 Packet  1 Packet Oral QDAY PRN Brandon Fajardo M.D.        And   • magnesium hydroxide (MILK OF MAGNESIA) suspension 30 mL  30 mL Oral QDAY PRN Brandon Fajardo M.D.        And   • bisacodyl (DULCOLAX) suppository 10 mg  10 mg Rectal QDAY PRN Brandon Fajardo M.D.       • acetaminophen (TYLENOL) tablet 650 mg  650 mg Oral Q6HRS PRN Brandon Fajardo M.D.   650 mg at 04/23/20 0236   • Respiratory Therapy Consult   Nebulization Continuous RT Brandon Fajardo M.D.       • ondansetron (ZOFRAN ODT) dispertab 4 mg  4 mg Oral Q4HRS PRN Brandon Fajardo M.D.        Or   • ondansetron (ZOFRAN) syringe/vial injection 4 mg  4 mg Intravenous Q4HRS PRN Brandon Fajardo M.D.   4 mg at 04/18/20 1443       Fluids    Intake/Output  Summary (Last 24 hours) at 4/23/2020 0729  Last data filed at 4/23/2020 0630  Gross per 24 hour   Intake 5229.12 ml   Output 3850 ml   Net 1379.12 ml       Laboratory          Recent Labs     04/21/20  0640  04/22/20  0200  04/22/20  1356 04/22/20 2000 04/23/20  0230   SODIUM 124*   < > 129*   < > 128* 130* 129*   POTASSIUM 3.7   < > 3.6   < > 3.5* 3.6 3.6   CHLORIDE 90*   < > 96   < > 98 99 99   CO2 20   < > 20   < > 18* 17* 18*   BUN 20   < > 19   < > 17 15 12   CREATININE 0.69   < > 0.82   < > 0.82 0.74 0.74   MAGNESIUM 2.0  --  2.1  --   --   --  1.9   PHOSPHORUS 3.0  --  2.3*  --  2.6  --  2.3*   CALCIUM 8.7   < > 8.3*   < > 7.7* 7.5* 7.8*    < > = values in this interval not displayed.     Recent Labs     04/22/20  1356 04/22/20 2000 04/23/20  0230   GLUCOSE 130* 147* 124*     Recent Labs     04/21/20  0450 04/22/20 0200 04/23/20  0230   WBC 11.5* 11.3* 12.0*   NEUTSPOLYS 72.40* 69.60 71.00   LYMPHOCYTES 10.70* 12.20* 10.40*   MONOCYTES 15.70* 16.80* 17.20*   EOSINOPHILS 0.10 0.30 0.40   BASOPHILS 0.30 0.30 0.40     Recent Labs     04/21/20  0450 04/22/20 0200 04/23/20  0230   RBC 5.32 5.46 4.88   HEMOGLOBIN 14.4 14.5 13.0*   HEMATOCRIT 40.6* 43.6 39.0*   PLATELETCT 248 286 283       Imaging  CTA neck:  IMPRESSION:     Mild carotid and vertebral artery atherosclerosis without significant stenosis, aneurysm or occlusion     Mild bronchial wall thickening and groundglass could indicate edema. Reactive airways disease/infection is not excluded    CTA head:  IMPRESSION:     CT angiogram of the [place] is unable to identify source of subarachnoid hemorrhage     Slight new visualization of acute subarachnoid hemorrhage in the lateral ventricles and there is slight temporal lobe dilatation. Attention in follow-up is advised     Otherwise stable moderate acute subarachnoid hemorrhage filling the basal cisterns as above    Assessment/Plan  * Hyponatremia  Assessment & Plan  Return to ICU for tighter sodium  management at the request neurosurgery 4/20  Improving  Question of CSS vs combination of SIADH and CSS, both shpould be transietn and continue to improve over the next few days to weeks  Serial sodium levels, every 6 hour lab  Goal sodium 135-145  Salt tabs 4g TID  Will tx to floor w/ 3% at set rate; nursing to call for titration  q6 sodiums  Added florinef which may need to be increased over the next few days  Encourage electrolyte oral salt over free H20      Hypertensive emergency- (present on admission)  Assessment & Plan  Remains uncontrolled  Optimize oral antihypertensive regimen  Continue amlodipine 10mg this am  Nicardipine PRN  Hydralazine and labetalol prn  Coreg 12.5 BID  Hydralazine 25 q8  Increase Lisinopril 40mg daily    Non-aneurysmal perimesencephalic subarachnoid hemorrhage (HCC)- (present on admission)  Assessment & Plan  With no clear aneurysm demonstrated on CTA head/neck on 4/16  CT angio 4/17 without AVM or aneurysm, transferred to neurosurgery floor 4/18  Return to ICU 4/20 sodium and BP control per neurosurgery  Neurochecks every 4 hours, monitor for the need for more frequent  Monitor for the need for follow-up imaging studies, confer with neurosurgery daily  SBP goals <160  Goal sodium 135-145  Euglycemia  Pain control without oversedation  Nimotop stopped and patient transition to ACE inhibitor and beta-blocker while on neurosurgical floor as no indication for nimodipine at present as SAH not felt to be aneurysmal      Hypokalemia  Assessment & Plan  Replete to goal > 4, ERP  Serial BMP    Alcohol abuse, daily use- (present on admission)  Assessment & Plan  Monitor for alcohol withdrawal   Replete electrolytes as needed, ERP  Vitamin supplementation as clinically appropriate    Class 1 obesity due to excess calories without serious comorbidity with body mass index (BMI) of 33.0 to 33.9 in adult- (present on admission)  Assessment & Plan  Weight loss to be encouraged as clinically  appropriate  Monitor for CHARLES  TSH is normal    Hypomagnesemia  Assessment & Plan  Replete to goal > 2, ERP  Serial magnesium level    Hyperlipidemia- (present on admission)  Assessment & Plan  Weight loss to be encouraged  Diet  Statin when clinically appropriate       VTE:  Contraindicated  Ulcer: Not Indicated  Lines: None    I have performed a physical exam and reviewed and updated ROS and Plan today (4/23/2020). In review of yesterday's note (4/22/2020), there are no changes except as documented above.     Discussed patient condition and risk of morbidity and/or mortality with RN, Pharmacy, [employer] resident, Patient and neurosurgery

## 2020-04-23 NOTE — DOCUMENTATION QUERY
[facility]                                                                       Query Response Note      PATIENT:               NOLAN MAURER  ACCT #:                  [id number]  MRN:                     [id number]  :                      1960  ADMIT DATE:       2020 8:21 PM  DISCH DATE:          RESPONDING  PROVIDER #:        [id number]           QUERY TEXT:    Respiratory Insufficiency is documented in the Medical Record.  Please further specify or clarify this diagnosis, if applicable.    NOTE:  If an appropriate response is not listed below, please respond with a new note.    The patient's Clinical Indicators include:  Clinical indicators-  Per PN: Acute respiratory insufficiency, likely result of IVF overload, 6.5 liters (+)  Per RN flowsheet: SpO2 86% on RA, placed on O2; Diminished bilateral lung sounds    Treatments-   IV Lasix  Mobilize  Continuous O2 therapy  Respiratory protocol    Risks-  Acute respiratory insufficiency, IVF overlaod, (+) 6.5 liters, SpO2 86% on RA, SAH    Thank you,  Carmen Alvarez, [facility] RN  [phone]  Options provided:   -- Acute respiratory failure with hypoxia   -- Acute respiratory failure without hypoxia   -- Acute respiratory distress   -- Hypoxia   -- Unable to determine      Query created by: Carmen Alvarez on 2020 12:51 PM    RESPONSE TEXT:    Unable to determine          Electronically signed by:  EVELINA CADET MD 2020 7:30 AM

## 2020-04-23 NOTE — PROGRESS NOTES
Pt transferred to Neurology S196-1 report called to [facility] all questions answered. Pt belongings packed including glasses, phone and  that wife mailed to pt. Pt VSS. Unsteady transfer to [facility] r/t pain in ankle. Pt just received tylenol for pain.

## 2020-04-23 NOTE — PROGRESS NOTES
[facility] ICU Progress Note      Admit Date: 4/16/2020    Resident(s): Carmen Barker M.D.   Attending:  JOHN HOFFMAN/ Dr. Sorto    Patient ID:    Name:  J Luis Rae     YOB: 1960  Age:  60 y.o.  male   MRN:  [id number]    Hospital Course (carried forward and updated):  J Luis Rae is a 60 y.o. male with PMHx of HTN, initially transferred from [facility] 4/16/2020 with subarachnoid hemorrhage and hypertensive emergency.  Patient was admitted to ICU on nicardipine drip, new body pain and Vasotec.  He underwent cerebral angiogram did not show any evidence of aneurysm.  Was seen by neurosurgery, Dr. Randolph who recommended conservative management.  Patient was transferred to the floor on 4/18/2020 in order to start him on appropriate antihypertensive regimen for home, with goal SBP less than 160.  On the floor, off the nicardipine drip, patient demonstrated resistant hypertension with systolic blood pressure in 160s to 170s, while on hydrochlorothiazide, lisinopril, amlodipine and scheduled hydralazine.  Plasma renin and aldosterone levels were drawn and are currently pending.  Renal duplex ultrasound did not show any renal artery stenosis.  Patient sodium level dropped from 135->127.  He had received 1 dose of Lasix in the ICU and was on hydrochlorothiazide for 1 day.  Hydrochlorothiazide was discontinued in view of hyponatremia.  Neurosurgery was updated about patient's sodium levels and recommended starting patient on salt tablets 3 g/day with every 6 hours monitoring of sodium.  Patient's sodium continued to drop to 123.   Discussed with Neurosurgery PA, Daphne Magallanes, about drop in sodium from 127->123.  This update was discussed by neurosurgery PA with Dr. Randolph, who recommended transferring patient to the ICU for initiation of hypertonic saline protocol for concern of cerebral salt wasting.     Consultants:  Critical Care  Neurosurgery      Interval Events:  Sodium with little  "change on 50/h hypertonic saline, salt tabs 4g TID, free water restriction. Will add fludrocortisone this am.   Nicardipine required for BP control overnight, discontinued this am. Will increase carvedilol. IV prn's available, can increase PO hydralazine if using prn's regularly.  Headache when BP >140. Tylenol prn. Can start gabapentin if persistent.    Good mentation, AAOx4. Unsteadiness with standing yesterday. Previously cleared for home by PT/OT, re-eval pending.      Review of Systems   Constitutional: Negative for chills and fever.   HENT: Negative for congestion and nosebleeds.         Nasal dryness   Eyes: Negative for blurred vision and double vision.        Dry eyes   Respiratory: Negative for cough and shortness of breath.    Cardiovascular: Negative for chest pain and palpitations.   Gastrointestinal: Negative for nausea and vomiting.   Genitourinary: Negative for dysuria and urgency.   Musculoskeletal: Negative for falls and myalgias.   Skin: Negative for itching and rash.   Neurological: Negative for dizziness and weakness.   Endo/Heme/Allergies: Positive for environmental allergies. Negative for polydipsia.   Psychiatric/Behavioral: Negative for depression and hallucinations. The patient is not nervous/anxious.        PHYSICAL EXAM:  Vitals:    04/23/20 0515 04/23/20 0545 04/23/20 0600 04/23/20 0615   BP: 141/58 143/68 149/66 140/63   Pulse: 67 79 77 84   Resp: (!) 48 (!) 42 19 (!) 25   Temp:       TempSrc:       SpO2:   95%    Weight:       Height:        Body mass index is 32.18 kg/m².  Latest Vitals:  /63   Pulse 84   Temp 36.6 °C (97.8 °F) (Temporal)   Resp (!) 25   Ht 1.93 m (6' 4\")   Wt 119.9 kg (264 lb 5.3 oz)   SpO2 95%   BMI 32.18 kg/m²   O2 therapy: Pulse Oximetry: 95 %, O2 (LPM): 1, O2 Delivery Device: Nasal Cannula  Vitals Range last 24h:  Temp:  [36.4 °C (97.5 °F)-37.1 °C (98.7 °F)] 36.6 °C (97.8 °F)  Pulse:  [63-84] 84  Resp:  [9-48] 25  BP: (106-173)/(56-80) 140/63  SpO2:  " [91 %-98 %] 95 %       Intake/Output Summary (Last 24 hours) at 4/23/2020 0729  Last data filed at 4/23/2020 0630  Gross per 24 hour   Intake 5229.12 ml   Output 3850 ml   Net 1379.12 ml        Physical Exam   Constitutional: He is oriented to person, place, and time and well-developed, well-nourished, and in no distress.   obese   HENT:   Head: Normocephalic and atraumatic.   Eyes: Pupils are equal, round, and reactive to light. EOM are normal. No scleral icterus.   Neck: Normal range of motion. Neck supple.   Cardiovascular: Normal rate and regular rhythm.   Pulmonary/Chest: Effort normal and breath sounds normal. No respiratory distress. He has no wheezes. He has no rales.   Abdominal: Soft. There is no abdominal tenderness.   Musculoskeletal: Normal range of motion.         General: No tenderness.   Neurological: He is alert and oriented to person, place, and time.   Skin: Skin is warm and dry.   Psychiatric: Mood and affect normal.   Nursing note and vitals reviewed.          Recent Labs     04/21/20  0640  04/22/20  0200 04/22/20  1356 04/22/20 2000 04/23/20  0230   SODIUM 124*   < > 129*   < > 128* 130* 129*   POTASSIUM 3.7   < > 3.6   < > 3.5* 3.6 3.6   CHLORIDE 90*   < > 96   < > 98 99 99   CO2 20   < > 20   < > 18* 17* 18*   BUN 20   < > 19   < > 17 15 12   CREATININE 0.69   < > 0.82   < > 0.82 0.74 0.74   MAGNESIUM 2.0  --  2.1  --   --   --  1.9   PHOSPHORUS 3.0  --  2.3*  --  2.6  --  2.3*   CALCIUM 8.7   < > 8.3*   < > 7.7* 7.5* 7.8*    < > = values in this interval not displayed.     Recent Labs     04/22/20  1356 04/22/20 2000 04/23/20  0230   GLUCOSE 130* 147* 124*     Recent Labs     04/21/20  0450 04/22/20  0200 04/23/20  0230   RBC 5.32 5.46 4.88   HEMOGLOBIN 14.4 14.5 13.0*   HEMATOCRIT 40.6* 43.6 39.0*   PLATELETCT 248 286 283     Recent Labs     04/21/20  0450 04/22/20  0200 04/23/20  0230   WBC 11.5* 11.3* 12.0*   NEUTSPOLYS 72.40* 69.60 71.00   LYMPHOCYTES 10.70* 12.20* 10.40*   MONOCYTES  15.70* 16.80* 17.20*   EOSINOPHILS 0.10 0.30 0.40   BASOPHILS 0.30 0.30 0.40       Meds:  • potassium phosphate ivpb  15 mmol     • magnesium sulfate  2 g     • fludrocortisone  0.1 mg     • labetalol  10 mg     • hydrALAZINE  10 mg     • lisinopril  40 mg     • hydrALAZINE  25 mg     • fluticasone  2 Spray     • sodium chloride  4 g     • MD Alert...Adult ICU Electrolyte Replacement per Pharmacy       • carboxymethylcellulose  1-2 Drop     • sodium chloride  2 Spray     • amLODIPine  10 mg     • carvedilol  12.5 mg     • hydrOXYzine HCl  50 mg     • 3% sodium chloride   50 mL/hr at 04/23/20 0715   • enalaprilat  0.625 mg     • calcium carbonate  1,000 mg     • hyoscyamine-maalox plus-lidocaine viscous  30 mL     • senna-docusate  2 Tab      And   • polyethylene glycol/lytes  1 Packet      And   • magnesium hydroxide  30 mL      And   • bisacodyl  10 mg     • acetaminophen  650 mg     • Respiratory Therapy Consult       • ondansetron  4 mg      Or   • ondansetron  4 mg          Procedures:  4/17/2020 Cerebral angiogram    Imaging:  US-RENAL ARTERY DUPLEX COMP   Final Result      IR-CAROTID-CEREBRAL BILATERAL   Final Result         1.  Normal cerebral arteriogram. No evidence of aneurysm.   2.  The origin of the left posterior inferior cerebellar artery is cut off on the lateral view and largely obscured on the AP view due to overlapping structures. This was not noticed during the procedure, only at the time of final interpretation.    Attention to this location on follow-up CTA or MRA is recommended.      CT-CTA NECK WITH & W/O-POST PROCESSING   Final Result      Mild carotid and vertebral artery atherosclerosis without significant stenosis, aneurysm or occlusion      Mild bronchial wall thickening and groundglass could indicate edema. Reactive airways disease/infection is not excluded      CT-CTA HEAD WITH & W/O-POST PROCESS   Final Result      CT angiogram of the [place] is unable to identify source of  subarachnoid hemorrhage      Slight new visualization of acute subarachnoid hemorrhage in the lateral ventricles and there is slight temporal lobe dilatation. Attention in follow-up is advised      Otherwise stable moderate acute subarachnoid hemorrhage filling the basal cisterns as above      OUTSIDE IMAGES-CT HEAD   Final Result          Problem and Plan:  * Hyponatremia  Assessment & Plan  2/2 cerebral salt wasting syndrome. Stabilized on hypertonic saline.  -Start fludrocortisone   -Continue salt tabs 4g TID  -Continue hypertonic saline stable rate  -Continue free water restriction   -Close monitoring  -NSG following, appreciate assistance    Hypertensive emergency- (present on admission)  Assessment & Plan  The patient initially presented with a BP of 188/135. This may be contributing to SAH.   Continued hypertension requiring nicardipine, titration of oral antihypertensives.  Renal artery duplex was negative for renal artery stenosis.  Patient also has anxiety, and suffers from insomnia.  This could potentially be contribute to his high blood pressure. Wife reports that he had been in a heated argument on the phone directly prior to onset of symptoms.  He does not want to take any narcotics, sedatives.  He also has a history of 2-3 drinks, hard liquor every day.   Aldosterone wnl.  - Titrate oral antihypertensives: increase carvedilol dose, if insufficient increase hydralazine dose  - Discontinue nicardipine gtt  - PRN antihypertensives ordered  - Goal SBP<160 per neurosurgery  - Follow plasma renin  - Close blood pressure monitoring    Non-aneurysmal perimesencephalic subarachnoid hemorrhage (HCC)- (present on admission)  Assessment & Plan  Presented on 4/16/2020 with neck stiffness, headaches, and nausea/vomiting; wife reports that he had been in a heated argument on the phone directly prior to onset of symptoms. Non-con CT showed a SAH. Subsequent CTA did not show an aneurysm or source of SAH (although the  patient does have a family history of aneurysms). He does have a history of hypertension, but was not on any anti-hypertensive medications prior to admission. He is on aspirin but no other anti-coagulation.  Cerebral Angiography: negative for aneurysm  C/b cerebral salt wasting syndrome.  [employer]  No PT OT needs identified.  - Elevate head of bed  - fall/seizure/aspiration precautions  - Hold aspirin   - q4h neuro-checks  - SBP <160 per neurosurgery  - Neurosurgery following, appreciate recommendations  - Repeat PT/ OT eval  - Repeat CTA in 6 weeks, patient will follow in [place] with [facility]    Hypokalemia  Assessment & Plan  Monitor and replete prn    Alcohol abuse, daily use- (present on admission)  Assessment & Plan  The patient drinks alcohol daily (usually hard liquor, 2 to 3 glasses) and uses marijuana occasionally.   No evidence of withdrawal this admission.   - cessation counseling and education provided    Headache- (present on admission)  Assessment & Plan  2/2 SAH.  - prn tylenol  - consider gabapentin if persistent    Hypophosphatemia  Assessment & Plan  Monitor and replete prn    At risk for obstructive sleep apnea- (present on admission)  Assessment & Plan  STOP BANG score 7/8.  -Outpatient sleep study is indicated    Hypomagnesemia  Assessment & Plan  Monitor and replete prn    Pre-diabetes- (present on admission)  Assessment & Plan  A1c was 6.3.   - Continue SSI and hypoglycemia protocol  - Consider lifestyle modification counseling prior to discharge    Hyperlipidemia- (present on admission)  Assessment & Plan  The patient's LDL is elevated at 143 and total cholesterol is 209.   - Start statin when clinically appropriate      DISPO: transfer to telemetry on stable hypertonic saline dose    CODE STATUS: FULL    Quality Measures:  Feeding: regular diet, no free water  Analgesia: prn acetaminophen  Sedation: prn hydroxyzine   Thromboprophylaxis: SCDs, pharmacologic ppx  contraindicated 2/2 intracranial bleeding  Head of bed: >30 degrees  Ulcer prophylaxis: n/a  Glycemic control: SSI  Bowel care: bowel regimen  Indwelling lines: PIVs  Deescalation of antibiotics: n/a      Carmen Barker M.D.

## 2020-04-23 NOTE — PROGRESS NOTES
R ICU Transfer Note                                                                                         ICU Admit Date: 4/20/2020       ICU Discharge Date: 4/23/2020        Primary Diagnosis:   Hyponatremia    Secondary Diagnosis:   Hypertensive emergency  Subarachnoid hemorrhage  Hypokalemia  Hypophosphatemia      ICU Course Summary (Brief Narrative):   J Luis Rae is a 60 y.o. male with Hx of HTN, initially transferred from [facility] 4/16/2020 with subarachnoid hemorrhage and hypertensive emergency.  Patient was admitted to ICU on nicardipine drip, new body pain and Vasotec.  He underwent cerebral angiogram did not show any evidence of aneurysm.  Was seen by neurosurgery, Dr. Randolph who recommended conservative management.  Patient was transferred to the floor on 4/18/2020 in order to start him on appropriate antihypertensive regimen for home, with goal SBP less than 160.  On the floor, off the nicardipine drip, patient demonstrated resistant hypertension with systolic blood pressure in 160s to 170s, while on hydrochlorothiazide, lisinopril, amlodipine and scheduled hydralazine.  Plasma renin and aldosterone levels were drawn and are currently pending.  Renal duplex ultrasound did not show any renal artery stenosis.  Patient sodium level dropped from 135->127.  He had received 1 dose of Lasix in the ICU and was on hydrochlorothiazide for 1 day.  Hydrochlorothiazide was discontinued in view of hyponatremia.  Neurosurgery was updated about patient's sodium levels and recommended starting patient on salt tablets 3 g/day with every 6 hours monitoring of sodium.  Patient's sodium continued to drop to 123.   Discussed with Neurosurgery PA, Daphne Magallanes, about drop in sodium from 127->123.  This update was discussed by neurosurgery PA with Dr. Randolph, who recommended transferring patient to the ICU for initiation of hypertonic saline protocol for concern of cerebral salt wasting.   On  transfer to ICU, hypertonic saline was started and there was very little improvement in Na on hypertonic saline at 30/h. It was increased to 40/h, salt tabs were increased to 2g TID w meals, placed on free water restriction. Nicardipine gtt was started for uncontrolled hypertension.   He then had a good response to increased hypertonic saline at 40/h, which was titrated back down to 30/h to avoid overcorrection. Sodium then plateaued, despite increasing to hypertonic saline to 50/h over the following two days, increasing salt tabs to 4g TID w meals, and maintaining water restriction. He was therefore started on fludrocortisone on 4/23. Oral antihypertensives were titrated upward and nicardipine gtt was weaned off.  With blood pressure controlled, he was determined to be safe for transfer out of ICU with physician titration of hypertonic saline.       Important Events in the ICU with relevant dates:  - Central Line: n/a   - Intubation: n/a   - Pressors: n/a   - Cardona catheter: n/a  - Tube feeding: n/a  - Antibiotics: n/a   - Other procedures: nicardipine gtt 4/21-4/23, hypertonic saline gtt 4/20-present     Labs and imaging studies to be continued with their indications:  - CBC: anemia  - CMP or BMP: close monitoring of Na, monitor K  - Magnesium: daily  - Phosphorus: daily or bid   - Chest Xray: prn  - Other studies: prn     Things to follow:  - Sodium level, decrease gtt if sodium improves, can increase fludrocortisone if no improvement  - BP, titrate PO antihypertensives upward if prn's are consistently being utilized  - Headache, can start gabapentin if tylenol ineffective  - NSG recommendations, including f/u angiogram 6 weeks after prior study  - PT re-evaluation  - Daily updates to patient's wife, Nestor [phone]      Carmen Barker M.D.

## 2020-04-23 NOTE — CARE PLAN
Problem: Communication  Goal: The ability to communicate needs accurately and effectively will improve  Outcome: MET     Problem: Safety  Goal: Will remain free from injury  Outcome: MET  Goal: Will remain free from falls  Outcome: MET     Problem: Infection  Goal: Will remain free from infection  Outcome: MET     Problem: Bowel/Gastric:  Goal: Normal bowel function is maintained or improved  Outcome: MET     Problem: Knowledge Deficit  Goal: Knowledge of disease process/condition, treatment plan, diagnostic tests, and medications will improve  Outcome: MET  Goal: Knowledge of the prescribed therapeutic regimen will improve  Outcome: MET     Problem: Pain Management  Goal: Pain level will decrease to patient's comfort goal  Outcome: PROGRESSING AS EXPECTED  Intervention: Follow pain managment plan developed in collaboration with patient and Interdisciplinary Team  Note: Using ice therapy and tylenol for pain management     Problem: Fluid Volume:  Goal: Will maintain balanced intake and output  Outcome: MET     Problem: Respiratory:  Goal: Respiratory status will improve  Outcome: MET     Problem: Psychosocial Needs:  Goal: Level of anxiety will decrease  Outcome: PROGRESSING AS EXPECTED  Intervention: Identify and develop with patient strategies to cope with anxiety triggers  Note: Working with patient to calm anxiety, provided emotional support and verbal coaching

## 2020-04-23 NOTE — CARE PLAN
Problem: Knowledge Deficit  Goal: Knowledge of the prescribed therapeutic regimen will improve  Outcome: PROGRESSING AS EXPECTED  Discussed plan of care with pt at start of shift, explained the importance of the fluid restriction & how it'll help the Na levels - pt understands, plan in place     Problem: Pain Management  Goal: Pain level will decrease to patient's comfort goal  Outcome: PROGRESSING AS EXPECTED  Pt aware of medication timing, pt asks appropriately for pain medications as needed     Problem: Mobility  Goal: Risk for activity intolerance will decrease  Outcome: PROGRESSING AS EXPECTED  Discussed importance of repositioning and mobility to increase strength - pt in agreement to mobilize in AM once he's rested

## 2020-04-24 LAB
ANION GAP SERPL CALC-SCNC: 11 MMOL/L (ref 7–16)
ANION GAP SERPL CALC-SCNC: 12 MMOL/L (ref 7–16)
ANION GAP SERPL CALC-SCNC: 13 MMOL/L (ref 7–16)
ANION GAP SERPL CALC-SCNC: 14 MMOL/L (ref 7–16)
BUN SERPL-MCNC: 11 MG/DL (ref 8–22)
BUN SERPL-MCNC: 12 MG/DL (ref 8–22)
BUN SERPL-MCNC: 13 MG/DL (ref 8–22)
BUN SERPL-MCNC: 13 MG/DL (ref 8–22)
CALCIUM SERPL-MCNC: 7.9 MG/DL (ref 8.5–10.5)
CALCIUM SERPL-MCNC: 8 MG/DL (ref 8.5–10.5)
CALCIUM SERPL-MCNC: 8 MG/DL (ref 8.5–10.5)
CALCIUM SERPL-MCNC: 8.2 MG/DL (ref 8.5–10.5)
CHLORIDE SERPL-SCNC: 100 MMOL/L (ref 96–112)
CHLORIDE SERPL-SCNC: 102 MMOL/L (ref 96–112)
CHLORIDE SERPL-SCNC: 102 MMOL/L (ref 96–112)
CHLORIDE SERPL-SCNC: 103 MMOL/L (ref 96–112)
CO2 SERPL-SCNC: 18 MMOL/L (ref 20–33)
CO2 SERPL-SCNC: 19 MMOL/L (ref 20–33)
CO2 SERPL-SCNC: 19 MMOL/L (ref 20–33)
CO2 SERPL-SCNC: 20 MMOL/L (ref 20–33)
CREAT SERPL-MCNC: 0.66 MG/DL (ref 0.5–1.4)
CREAT SERPL-MCNC: 0.72 MG/DL (ref 0.5–1.4)
CREAT SERPL-MCNC: 0.73 MG/DL (ref 0.5–1.4)
CREAT SERPL-MCNC: 0.77 MG/DL (ref 0.5–1.4)
GLUCOSE SERPL-MCNC: 124 MG/DL (ref 65–99)
GLUCOSE SERPL-MCNC: 129 MG/DL (ref 65–99)
GLUCOSE SERPL-MCNC: 130 MG/DL (ref 65–99)
GLUCOSE SERPL-MCNC: 130 MG/DL (ref 65–99)
MAGNESIUM SERPL-MCNC: 2.1 MG/DL (ref 1.5–2.5)
PHOSPHATE SERPL-MCNC: 2 MG/DL (ref 2.5–4.5)
POTASSIUM SERPL-SCNC: 3.6 MMOL/L (ref 3.6–5.5)
POTASSIUM SERPL-SCNC: 3.8 MMOL/L (ref 3.6–5.5)
RENIN PLAS-CCNC: 0.4 NG/ML/HR
SODIUM SERPL-SCNC: 130 MMOL/L (ref 135–145)
SODIUM SERPL-SCNC: 134 MMOL/L (ref 135–145)
SODIUM SERPL-SCNC: 134 MMOL/L (ref 135–145)
SODIUM SERPL-SCNC: 135 MMOL/L (ref 135–145)

## 2020-04-24 PROCEDURE — A9270 NON-COVERED ITEM OR SERVICE: HCPCS | Performed by: STUDENT IN AN ORGANIZED HEALTH CARE EDUCATION/TRAINING PROGRAM

## 2020-04-24 PROCEDURE — 700105 HCHG RX REV CODE 258: Performed by: INTERNAL MEDICINE

## 2020-04-24 PROCEDURE — 700102 HCHG RX REV CODE 250 W/ 637 OVERRIDE(OP): Performed by: STUDENT IN AN ORGANIZED HEALTH CARE EDUCATION/TRAINING PROGRAM

## 2020-04-24 PROCEDURE — A9270 NON-COVERED ITEM OR SERVICE: HCPCS | Performed by: INTERNAL MEDICINE

## 2020-04-24 PROCEDURE — 700102 HCHG RX REV CODE 250 W/ 637 OVERRIDE(OP): Performed by: INTERNAL MEDICINE

## 2020-04-24 PROCEDURE — A9270 NON-COVERED ITEM OR SERVICE: HCPCS | Performed by: PSYCHIATRY & NEUROLOGY

## 2020-04-24 PROCEDURE — 99232 SBSQ HOSP IP/OBS MODERATE 35: CPT | Mod: GC | Performed by: INTERNAL MEDICINE

## 2020-04-24 PROCEDURE — 83735 ASSAY OF MAGNESIUM: CPT

## 2020-04-24 PROCEDURE — 700102 HCHG RX REV CODE 250 W/ 637 OVERRIDE(OP): Performed by: PSYCHIATRY & NEUROLOGY

## 2020-04-24 PROCEDURE — 97164 PT RE-EVAL EST PLAN CARE: CPT

## 2020-04-24 PROCEDURE — 36415 COLL VENOUS BLD VENIPUNCTURE: CPT

## 2020-04-24 PROCEDURE — 770020 HCHG ROOM/CARE - TELE (206)

## 2020-04-24 PROCEDURE — 84100 ASSAY OF PHOSPHORUS: CPT

## 2020-04-24 PROCEDURE — 80048 BASIC METABOLIC PNL TOTAL CA: CPT

## 2020-04-24 PROCEDURE — 700101 HCHG RX REV CODE 250: Performed by: INTERNAL MEDICINE

## 2020-04-24 RX ORDER — POTASSIUM CHLORIDE 20 MEQ/1
20 TABLET, EXTENDED RELEASE ORAL ONCE
Status: COMPLETED | OUTPATIENT
Start: 2020-04-24 | End: 2020-04-24

## 2020-04-24 RX ORDER — POTASSIUM CHLORIDE 20 MEQ/1
40 TABLET, EXTENDED RELEASE ORAL ONCE
Status: DISCONTINUED | OUTPATIENT
Start: 2020-04-24 | End: 2020-04-24

## 2020-04-24 RX ADMIN — CARVEDILOL 25 MG: 6.25 TABLET, FILM COATED ORAL at 17:42

## 2020-04-24 RX ADMIN — ACETAMINOPHEN 650 MG: 325 TABLET, FILM COATED ORAL at 05:09

## 2020-04-24 RX ADMIN — POTASSIUM PHOSPHATE, MONOBASIC AND POTASSIUM PHOSPHATE, DIBASIC 30 MMOL: 224; 236 INJECTION, SOLUTION, CONCENTRATE INTRAVENOUS at 07:39

## 2020-04-24 RX ADMIN — Medication 4 G: at 12:27

## 2020-04-24 RX ADMIN — SODIUM CHLORIDE: 3 INJECTION, SOLUTION INTRAVENOUS at 17:49

## 2020-04-24 RX ADMIN — Medication 4 G: at 07:40

## 2020-04-24 RX ADMIN — HYDRALAZINE HYDROCHLORIDE 25 MG: 25 TABLET, FILM COATED ORAL at 17:41

## 2020-04-24 RX ADMIN — HYDRALAZINE HYDROCHLORIDE 25 MG: 25 TABLET, FILM COATED ORAL at 00:31

## 2020-04-24 RX ADMIN — ACETAMINOPHEN 650 MG: 325 TABLET, FILM COATED ORAL at 21:25

## 2020-04-24 RX ADMIN — Medication 4 G: at 17:42

## 2020-04-24 RX ADMIN — ACETAMINOPHEN 650 MG: 325 TABLET, FILM COATED ORAL at 09:21

## 2020-04-24 RX ADMIN — POTASSIUM CHLORIDE 20 MEQ: 1500 TABLET, EXTENDED RELEASE ORAL at 06:20

## 2020-04-24 RX ADMIN — LISINOPRIL 40 MG: 20 TABLET ORAL at 09:21

## 2020-04-24 RX ADMIN — HYDRALAZINE HYDROCHLORIDE 25 MG: 25 TABLET, FILM COATED ORAL at 23:40

## 2020-04-24 RX ADMIN — CARVEDILOL 25 MG: 6.25 TABLET, FILM COATED ORAL at 07:40

## 2020-04-24 RX ADMIN — HYDRALAZINE HYDROCHLORIDE 25 MG: 25 TABLET, FILM COATED ORAL at 04:48

## 2020-04-24 RX ADMIN — AMLODIPINE BESYLATE 10 MG: 10 TABLET ORAL at 04:48

## 2020-04-24 RX ADMIN — HYDRALAZINE HYDROCHLORIDE 25 MG: 25 TABLET, FILM COATED ORAL at 12:27

## 2020-04-24 RX ADMIN — ACETAMINOPHEN 650 MG: 325 TABLET, FILM COATED ORAL at 14:51

## 2020-04-24 RX ADMIN — SODIUM CHLORIDE: 3 INJECTION, SOLUTION INTRAVENOUS at 05:04

## 2020-04-24 RX ADMIN — FLUDROCORTISONE ACETATE 0.1 MG: 0.1 TABLET ORAL at 04:48

## 2020-04-24 ASSESSMENT — ENCOUNTER SYMPTOMS
TREMORS: 0
EYE PAIN: 0
SPEECH CHANGE: 0
COUGH: 0
HEADACHES: 0
ABDOMINAL PAIN: 0
HEMOPTYSIS: 0
DIARRHEA: 0
WEAKNESS: 0
FEVER: 0
VOMITING: 0
DEPRESSION: 0
SHORTNESS OF BREATH: 0
TINGLING: 0
SINUS PAIN: 0
PALPITATIONS: 0
PHOTOPHOBIA: 0
CHILLS: 0
BACK PAIN: 0
SPUTUM PRODUCTION: 0
BLURRED VISION: 0
CONSTIPATION: 0
FOCAL WEAKNESS: 0
SORE THROAT: 0
DOUBLE VISION: 0
NAUSEA: 0
SEIZURES: 0
LOSS OF CONSCIOUSNESS: 0
HEARTBURN: 0
BRUISES/BLEEDS EASILY: 0
NECK PAIN: 0
DIZZINESS: 0
SENSORY CHANGE: 0
MYALGIAS: 0

## 2020-04-24 ASSESSMENT — COGNITIVE AND FUNCTIONAL STATUS - GENERAL
SUGGESTED CMS G CODE MODIFIER MOBILITY: CJ
STANDING UP FROM CHAIR USING ARMS: A LITTLE
WALKING IN HOSPITAL ROOM: A LITTLE
CLIMB 3 TO 5 STEPS WITH RAILING: A LOT
MOBILITY SCORE: 20

## 2020-04-24 ASSESSMENT — GAIT ASSESSMENTS
ASSISTIVE DEVICE: FRONT WHEEL WALKER
DISTANCE (FEET): 60
GAIT LEVEL OF ASSIST: MINIMAL ASSIST
DEVIATION: STEP TO;DECREASED HEEL STRIKE;DECREASED TOE OFF

## 2020-04-24 ASSESSMENT — PATIENT HEALTH QUESTIONNAIRE - PHQ9
SUM OF ALL RESPONSES TO PHQ9 QUESTIONS 1 AND 2: 0
1. LITTLE INTEREST OR PLEASURE IN DOING THINGS: NOT AT ALL
2. FEELING DOWN, DEPRESSED, IRRITABLE, OR HOPELESS: NOT AT ALL

## 2020-04-24 NOTE — CARE PLAN
Problem: Venous Thromboembolism (VTW)/Deep Vein Thrombosis (DVT) Prevention:  Goal: Patient will participate in Venous Thrombosis (VTE)/Deep Vein Thrombosis (DVT)Prevention Measures  Outcome: PROGRESSING AS EXPECTED  Intervention: Ensure patient wears graduated elastic stockings (MUSA hose) and/or SCDs, if ordered, when in bed or chair (Remove at least once per shift for skin check)  Flowsheets  Taken 4/24/2020 0800  Mechanical Prophylaxis: SCDs, Sequential Compression Device  Taken 4/24/2020 1510  SCDs, Sequential Compression Device: On  Note: Pt has SCDs ordered. Education given on importance, pt verbalized understanding. SCDs on, pt will intermittently refuse.      Problem: Bowel/Gastric:  Goal: Will not experience complications related to bowel motility  Outcome: PROGRESSING AS EXPECTED  Intervention: Assess baseline bowel pattern  Note: Pt's GI is WDL. Pt has normoactive bowel sounds, voiding appropriately using bathroom, and reports that he is passing gas.

## 2020-04-24 NOTE — THERAPY
"PT re-orders received to re-evaluate gait deviations due to acute L LE pain. Pt demonstrates decline in balance, gait, strength and activity tolerance due to L ankle and knee pain and edema which he contributes to have foot stuck in prolonged DF stretch due to foot board of bed. Pt with limited PROM/AROM of ankle and knee due to pain, which gradually improved with gentle mobilization. Tolerated ambulation with FWW and step to gait pattern x60 ft, reliance on UEs to offload L ankle. Educated on ankle pumps and heelslides through available ROM, gentle DF stretching with blanket and ice as needed with pt verbalizing understanding. PT to follow while in house. Recommend FWW upon DC home and follow up with OP PT as needed to address residual impairment.     Physical Therapy Evaluation completed.   Bed Mobility:  Supine to Sit: Supervised  Transfers: Sit to Stand: Supervised  Gait: Level Of Assist: Minimal Assist with Front-Wheel Walker       Plan of Care: Will benefit from Physical Therapy 4 times per week  Discharge Recommendations: Equipment: Front-Wheel Walker (pt is 6'4\")   Post-acute therapy: Recommend outpatient physical therapy services to address higher level deficits.      See \"Rehab Therapy-Acute\" Patient Summary Report for complete documentation.     "

## 2020-04-24 NOTE — PROGRESS NOTES
Neurosurgery Progress Note    Subjective:  Hospital Day #9 SAH with severe headache  CTA/IR cerebral angio negative for aneurysm  BP control much improved  Serial scans show stable SAH  Na 130 this AM,   Without complaints.    Exam:  GCS 15  No drift,   Motor 5/5 and symmetric          BP  Min: 124/80  Max: 166/81  Pulse  Av.9  Min: 67  Max: 83  Resp  Av.2  Min: 11  Max: 26  Temp  Av.2 °C (99 °F)  Min: 36.4 °C (97.5 °F)  Max: 38.3 °C (100.9 °F)  SpO2  Av.4 %  Min: 91 %  Max: 96 %    No data recorded    Recent Labs     20  0200 20  0230   WBC 11.3* 12.0*   RBC 5.46 4.88   HEMOGLOBIN 14.5 13.0*   HEMATOCRIT 43.6 39.0*   MCV 79.9* 79.9*   MCH 26.6* 26.6*   MCHC 33.3* 33.3*   RDW 38.7 38.5   PLATELETCT 286 283   MPV 8.9* 9.0     Recent Labs     20  1522 20  2134 20  0340   SODIUM 131* 133* 130*   POTASSIUM 3.7 3.7 3.6   CHLORIDE 98 99 100   CO2 19* 21 19*   GLUCOSE 137* 135* 124*   BUN 12 14 12   CREATININE 0.74 0.73 0.66   CALCIUM 7.9* 7.9* 8.0*               Intake/Output       20 07 - 20 0659 20 07 - 20 0659       Total 1748-0754 1470-3070 Total       Intake    P.O.  983  670 1653  237  -- 237    P.O.  237 -- 237    I.V.  675  -- 675  --  -- --    Magnesium Sulfate Volume 100 -- 100 -- -- --    Cardene Volume 125 -- 125 -- -- --    Volume (mL) (3% sodium chloride (HYPERTONIC SALINE) 500mL infusion) 450 -- 450 -- -- --    IV Piggyback  249.9  -- 249.9  --  -- --    Volume (mL) (potassium phosphates 15 mmol in D5W 250 mL ivpb) 249.9 -- 249.9 -- -- --    Total Intake 1907.9 670 2577.9 237 -- 237       Output    Urine  825  1280 2105  --  -- --    Number of Times Voided 4 x -- 4 x -- -- --    Urine Void (mL) 825 1280 2105 -- -- --    Stool  --  -- --  --  -- --    Number of Times Stooled -- 0 x 0 x -- -- --    Total Output 825 1280 2105 -- -- --       Net I/O     1082.9 -610 472.9 237 -- 237             Intake/Output Summary (Last 24 hours) at 4/24/2020 0912  Last data filed at 4/24/2020 0800  Gross per 24 hour   Intake 2489.9 ml   Output 1830 ml   Net 659.9 ml            • potassium phosphate ivpb  30 mmol Once   • fludrocortisone  0.1 mg QAM   • carvedilol  25 mg BID WITH MEALS   • hydrALAZINE  25 mg Q6HRS   • acetaminophen  650 mg Q4HRS PRN   • labetalol  10 mg Q4HRS PRN   • lisinopril  40 mg Q DAY   • fluticasone  2 Spray QDAY PRN   • sodium chloride  4 g TID WITH MEALS   • carboxymethylcellulose  1-2 Drop PRN   • sodium chloride  2 Spray Q2HRS PRN   • amLODIPine  10 mg Q DAY   • hydrOXYzine HCl  50 mg TID PRN   • 3% sodium chloride   Continuous   • enalaprilat  0.625 mg Q6HRS PRN   • calcium carbonate  1,000 mg Q6HRS PRN   • hyoscyamine-maalox plus-lidocaine viscous  30 mL Once PRN   • senna-docusate  2 Tab BID    And   • polyethylene glycol/lytes  1 Packet QDAY PRN    And   • magnesium hydroxide  30 mL QDAY PRN    And   • bisacodyl  10 mg QDAY PRN   • Respiratory Therapy Consult   Continuous RT   • ondansetron  4 mg Q4HRS PRN    Or   • ondansetron  4 mg Q4HRS PRN       Assessment and Plan:  Hospital day #9  Non-aneurysmal SAH  Appreciate hospitalist/Intensivist care.  SBP to be <160mmHg  Na goals of 135-145  Q 4hr neuro checks   PT/OT  Xanax prn for anxiety  Pt needs repeat CTA in 6 weeks  Will f[facility]u in [facility] when Na stable   Answered all questions  Will continue to follow       Prophylactic anticoagulation: no         Start date/time:

## 2020-04-24 NOTE — PROGRESS NOTES
Assumed care of pt with preceptor ELISABET Olivo at 1900. Awake, alert, oriented x4. Reports 8/10 pain in L ankle and knee. Alternating heat and cold packs per pt preference. Denies numbness and tingling. Pt educated about fluid restriction, accepting of teaching. Pt currently in bed with bed alarm on, nonskid socks on, call light and belongings within reach, all needs met at this time.

## 2020-04-24 NOTE — CARE PLAN
Problem: Discharge Barriers/Planning  Goal: Patient's continuum of care needs will be met  Outcome: PROGRESSING SLOWER THAN EXPECTED   Patient awaiting medical clearance to discharge. Patient on 3% sodium drip.   Problem: Psychosocial Needs:  Goal: Level of anxiety will decrease  Outcome: PROGRESSING SLOWER THAN EXPECTED   Patient noted to be anxious, patient denies anxiety and anxiety medication.

## 2020-04-24 NOTE — PROGRESS NOTES
Daily Progress Note:     Date of Service: 4/24/2020  Primary Team: [facility] Green Team   Attending: Tono Graham M.D.   Senior Resident: Dr. Macias  Intern: Dr. Araya  Contact:  [phone]    Chief Complaint:   Chief Complaint   Patient presents with   • Headache     Posterior HA started at 1730 today with posterior neck pain   • Neck Pain     with associated nausea       ID: 60-year-old male with PMH HTN initially presented 4/16/2020 with SAH and hypertensive emergency, was admitted to ICU on nicardipine drip, nimodipine and Vasotec.  No source of bleed or aneurysm was identified on cerebral angiography, and patient did not require any neurosurgical intervention.  Was transferred to floor 4/18/2020 where he continued to have resistant hypertension, however had to be transferred back to the ICU on 4/20/20 in view of cerebral salt wasting requiring hypertonic saline drip.    ICU course: Patient continued to have resistant hyponatremia, finally sodium plateaued at 129-130 on 50 cc/hour hypertonic saline, salt tablets 4 g 3 times daily with addition of fludrocortisone.  Patient had to be restarted on nicardipine drip for uncontrolled hypertension, and was eventually able to be weaned off with blood pressure controlled with oral antihypertensives.  4/23/2020: Transferred to floor    Subjective:   No acute events overnight.  Patient resting comfortably this a.m. without any complaint    Interval updates:  -Had a T-max of 100.9 overnight.  Was medicated with Tylenol.  Denied any subjective fever, chills, cough, upper respiratory symptoms, chest pain, shortness of breath, abdominal pain, LUTS, diarrhea/constipation.  Will monitor the temperature, do not suspect any source of infection as of now.  -Blood pressure was well controlled 120-150/60-80, highest reading was 166/81.  -Sodium 6 hourly trend: 129-> 128-> 131->133->130    Consultants/Specialty:  Neurosurgery  Review of Systems:    Review of Systems    Constitutional: Negative for chills and fever.   HENT: Negative for congestion, hearing loss, sinus pain and sore throat.    Eyes: Negative for blurred vision, double vision, photophobia and pain.   Respiratory: Negative for cough, hemoptysis, sputum production and shortness of breath.    Cardiovascular: Negative for chest pain, palpitations and leg swelling.   Gastrointestinal: Negative for abdominal pain, constipation, diarrhea, heartburn, nausea and vomiting.   Genitourinary: Negative for dysuria and urgency.   Musculoskeletal: Negative for back pain, myalgias and neck pain.   Skin: Negative for rash.   Neurological: Negative for dizziness, tingling, tremors, sensory change, speech change, focal weakness, seizures, loss of consciousness, weakness and headaches.   Endo/Heme/Allergies: Does not bruise/bleed easily.   Psychiatric/Behavioral: Negative for depression and suicidal ideas.       Objective Data:   Physical Exam:   Vitals:   Temp:  [36.4 °C (97.5 °F)-38.3 °C (100.9 °F)] 37.7 °C (99.8 °F)  Pulse:  [67-83] 73  Resp:  [11-26] 16  BP: (124-166)/(60-81) 144/69  SpO2:  [92 %-96 %] 95 %    Physical Exam  Constitutional:       General: He is not in acute distress.     Appearance: He is obese.   HENT:      Head: Normocephalic and atraumatic.      Nose: Nose normal.      Mouth/Throat:      Mouth: Mucous membranes are moist.      Pharynx: Oropharynx is clear.   Eyes:      Extraocular Movements: Extraocular movements intact.      Conjunctiva/sclera: Conjunctivae normal.      Pupils: Pupils are equal, round, and reactive to light.   Neck:      Musculoskeletal: Normal range of motion and neck supple. No neck rigidity.   Cardiovascular:      Rate and Rhythm: Normal rate and regular rhythm.      Pulses: Normal pulses.      Heart sounds: Normal heart sounds. No murmur. No friction rub. No gallop.    Pulmonary:      Effort: Pulmonary effort is normal.      Breath sounds: Normal breath sounds. No wheezing or rales.    Abdominal:      General: Bowel sounds are normal. There is no distension.      Palpations: Abdomen is soft.      Tenderness: There is no abdominal tenderness. There is no guarding.   Musculoskeletal: Normal range of motion.         General: No swelling or tenderness.      Right lower leg: No edema.      Left lower leg: No edema.   Skin:     General: Skin is warm and dry.      Capillary Refill: Capillary refill takes less than 2 seconds.   Neurological:      General: No focal deficit present.      Mental Status: He is alert and oriented to person, place, and time.      Cranial Nerves: No cranial nerve deficit.      Sensory: No sensory deficit.      Motor: No weakness.      Coordination: Coordination normal.      Gait: Gait normal.   Psychiatric:         Mood and Affect: Mood normal.         Thought Content: Thought content does not include homicidal or suicidal ideation.         Judgment: Judgment normal.         Quality Measures  Quality-Core Measures   Reviewed items::  Labs reviewed and Medications reviewed  Cardona catheter::  No Cardona  DVT prophylaxis pharmacological::  Contraindicated - High bleeding risk  DVT prophylaxis - mechanical:  SCDs  Ulcer Prophylaxis::  Not indicated  Assessed for rehabilitation services:  Patient was assess for and/or received rehabilitation services during this hospitalization      Labs:   Recent Labs     04/22/20  0200 04/23/20  0230   WBC 11.3* 12.0*   RBC 5.46 4.88   HEMOGLOBIN 14.5 13.0*   HEMATOCRIT 43.6 39.0*   MCV 79.9* 79.9*   MCH 26.6* 26.6*   RDW 38.7 38.5   PLATELETCT 286 283   MPV 8.9* 9.0   NEUTSPOLYS 69.60 71.00   LYMPHOCYTES 12.20* 10.40*   MONOCYTES 16.80* 17.20*   EOSINOPHILS 0.30 0.40   BASOPHILS 0.30 0.40      Recent Labs     04/23/20 2134 04/24/20  0340 04/24/20  0847   SODIUM 133* 130* 134*   POTASSIUM 3.7 3.6 3.6   CHLORIDE 99 100 102   CO2 21 19* 19*   GLUCOSE 135* 124* 130*   BUN 14 12 11      Recent Labs     04/23/20 2134 04/24/20  0340 04/24/20  0847    CREATININE 0.73 0.66 0.77      Lab Results   Component Value Date/Time    PROTHROMBTM 13.3 04/17/2020 02:55 AM    INR 0.99 04/17/2020 02:55 AM         Imaging:   US-RENAL ARTERY DUPLEX COMP   Final Result      IR-CAROTID-CEREBRAL BILATERAL   Final Result         1.  Normal cerebral arteriogram. No evidence of aneurysm.   2.  The origin of the left posterior inferior cerebellar artery is cut off on the lateral view and largely obscured on the AP view due to overlapping structures. This was not noticed during the procedure, only at the time of final interpretation.    Attention to this location on follow-up CTA or MRA is recommended.      CT-CTA NECK WITH & W/O-POST PROCESSING   Final Result      Mild carotid and vertebral artery atherosclerosis without significant stenosis, aneurysm or occlusion      Mild bronchial wall thickening and groundglass could indicate edema. Reactive airways disease/infection is not excluded      CT-CTA HEAD WITH & W/O-POST PROCESS   Final Result      CT angiogram of the [place] is unable to identify source of subarachnoid hemorrhage      Slight new visualization of acute subarachnoid hemorrhage in the lateral ventricles and there is slight temporal lobe dilatation. Attention in follow-up is advised      Otherwise stable moderate acute subarachnoid hemorrhage filling the basal cisterns as above      OUTSIDE IMAGES-CT HEAD   Final Result           * Hyponatremia  Assessment & Plan  Due to cerebral salt wasting syndrome. Stabilized on hypertonic saline.    -Continue fludrocortisone   -Continue salt tabs 4g TID  -Continue hypertonic saline stable rate  -Continue free water restriction   -Close monitoring  -NSG following, appreciate assistance    Hypertensive emergency- (present on admission)  Assessment & Plan  The patient initially presented with a BP of 188/135. This may be contributing to SAH.   Continued hypertension requiring nicardipine, titration of oral  antihypertensives.  Renal artery duplex was negative for renal artery stenosis.Aldosterone wnl.  Patient also has anxiety, and suffers from insomnia.  This could potentially be contribute to his high blood pressure. Wife reports that he had been in a heated argument on the phone directly prior to onset of symptoms.  He does not want to take any narcotics, sedatives.  He also has a history of 2-3 drinks, hard liquor every day.     - Currently managed with amlodipine 10, Coreg 25 twice daily, hydralazine 25 every 6 hours, lisinopril 40 mg daily  - Titrate oral antihypertensives: increase carvedilol dose, if insufficient increase hydralazine dose  - PRN antihypertensives ordered: Vasotec, labetalol, hydralazine  - Goal SBP<160 per neurosurgery  - Follow plasma renin  - Close blood pressure monitoring    Non-aneurysmal perimesencephalic subarachnoid hemorrhage (HCC)- (present on admission)  Assessment & Plan  Presented on 2020 with neck stiffness, headaches, and nausea/vomiting; wife reports that he had been in a heated argument on the phone directly prior to onset of symptoms. Non-con CT showed a SAH. Subsequent CTA did not show an aneurysm or source of SAH (although the patient does have a family history of aneurysms). He does have a history of hypertension, but was not on any anti-hypertensive medications prior to admission. He is on aspirin but no other anti-coagulation.  Cerebral Angiography: negative for aneurysm  C/b cerebral salt wasting syndrome.  [employer] gradin    - Elevate head of bed  - fall/seizure/aspiration precautions  - Hold aspirin   - q4h neuro-checks  - SBP <160 per neurosurgery  - Neurosurgery following, appreciate recommendations  - Repeat PT/ OT eval  - Repeat CTA in 6 weeks, patient will follow in [place] with [facility]    Hypokalemia  Assessment & Plan  Monitor and replete prn    Alcohol abuse, daily use- (present on admission)  Assessment & Plan  The patient drinks alcohol  daily (usually hard liquor, 2 to 3 glasses) and uses marijuana occasionally.   No evidence of withdrawal this admission.   - cessation counseling and education provided    Headache- (present on admission)  Assessment & Plan  2/2 SAH.  - prn tylenol  - consider gabapentin if persistent    Hypophosphatemia  Assessment & Plan  Monitor and replete prn    At risk for obstructive sleep apnea- (present on admission)  Assessment & Plan  STOP BANG score 7/8.  -Outpatient sleep study is indicated    Hypomagnesemia  Assessment & Plan  Monitor and replete prn    Pre-diabetes- (present on admission)  Assessment & Plan  A1c was 6.3.   - Continue SSI and hypoglycemia protocol  - Consider lifestyle modification counseling prior to discharge    Hyperlipidemia- (present on admission)  Assessment & Plan  The patient's LDL is elevated at 143 and total cholesterol is 209.   - Start statin when clinically appropriate      Please note that this dictation was created using voice recognition software. I have made every reasonable attempt to correct obvious errors, but there may be errors of grammar and possibly content that I did not discover before finalizing the note.

## 2020-04-24 NOTE — PROGRESS NOTES
Monitor Summary: sinus rhythm rate 67-79, NE -.20, QRS -.10, QT -.36, with rare PVCs per strip from the monitor room.

## 2020-04-24 NOTE — PROGRESS NOTES
1630: Received call from monitor tech regarding possible ST elevation. No c/o chest tightness, pain, or fullness from pt. Pt resting comfortably in no acute distress. Tele leads adjusted on pt.     1730: Tele strips reviewed and compared to tele strips from ICU. Pt resting comfortably in no acute distress.     1750: Updated monitor strip requested from monitor tech.     1815: Monitor strip reviewed with charge RN. No ST elevation noted.

## 2020-04-25 LAB
ANION GAP SERPL CALC-SCNC: 12 MMOL/L (ref 7–16)
ANION GAP SERPL CALC-SCNC: 13 MMOL/L (ref 7–16)
ANION GAP SERPL CALC-SCNC: 13 MMOL/L (ref 7–16)
ANION GAP SERPL CALC-SCNC: 14 MMOL/L (ref 7–16)
BUN SERPL-MCNC: 11 MG/DL (ref 8–22)
BUN SERPL-MCNC: 12 MG/DL (ref 8–22)
BUN SERPL-MCNC: 13 MG/DL (ref 8–22)
BUN SERPL-MCNC: 14 MG/DL (ref 8–22)
CALCIUM SERPL-MCNC: 7.8 MG/DL (ref 8.5–10.5)
CALCIUM SERPL-MCNC: 8.1 MG/DL (ref 8.5–10.5)
CALCIUM SERPL-MCNC: 8.1 MG/DL (ref 8.5–10.5)
CALCIUM SERPL-MCNC: 8.2 MG/DL (ref 8.5–10.5)
CHLORIDE SERPL-SCNC: 100 MMOL/L (ref 96–112)
CHLORIDE SERPL-SCNC: 101 MMOL/L (ref 96–112)
CHLORIDE SERPL-SCNC: 102 MMOL/L (ref 96–112)
CHLORIDE SERPL-SCNC: 105 MMOL/L (ref 96–112)
CO2 SERPL-SCNC: 17 MMOL/L (ref 20–33)
CO2 SERPL-SCNC: 20 MMOL/L (ref 20–33)
CO2 SERPL-SCNC: 20 MMOL/L (ref 20–33)
CO2 SERPL-SCNC: 21 MMOL/L (ref 20–33)
CREAT SERPL-MCNC: 0.65 MG/DL (ref 0.5–1.4)
CREAT SERPL-MCNC: 0.67 MG/DL (ref 0.5–1.4)
CREAT SERPL-MCNC: 0.84 MG/DL (ref 0.5–1.4)
CREAT SERPL-MCNC: 0.89 MG/DL (ref 0.5–1.4)
GLUCOSE SERPL-MCNC: 106 MG/DL (ref 65–99)
GLUCOSE SERPL-MCNC: 122 MG/DL (ref 65–99)
GLUCOSE SERPL-MCNC: 126 MG/DL (ref 65–99)
GLUCOSE SERPL-MCNC: 142 MG/DL (ref 65–99)
MAGNESIUM SERPL-MCNC: 2.2 MG/DL (ref 1.5–2.5)
PHOSPHATE SERPL-MCNC: 2.1 MG/DL (ref 2.5–4.5)
POTASSIUM SERPL-SCNC: 3.3 MMOL/L (ref 3.6–5.5)
POTASSIUM SERPL-SCNC: 3.4 MMOL/L (ref 3.6–5.5)
POTASSIUM SERPL-SCNC: 4 MMOL/L (ref 3.6–5.5)
POTASSIUM SERPL-SCNC: 4.5 MMOL/L (ref 3.6–5.5)
SODIUM SERPL-SCNC: 131 MMOL/L (ref 135–145)
SODIUM SERPL-SCNC: 134 MMOL/L (ref 135–145)
SODIUM SERPL-SCNC: 135 MMOL/L (ref 135–145)
SODIUM SERPL-SCNC: 138 MMOL/L (ref 135–145)

## 2020-04-25 PROCEDURE — 700102 HCHG RX REV CODE 250 W/ 637 OVERRIDE(OP): Performed by: INTERNAL MEDICINE

## 2020-04-25 PROCEDURE — 700105 HCHG RX REV CODE 258: Performed by: INTERNAL MEDICINE

## 2020-04-25 PROCEDURE — 700102 HCHG RX REV CODE 250 W/ 637 OVERRIDE(OP): Performed by: STUDENT IN AN ORGANIZED HEALTH CARE EDUCATION/TRAINING PROGRAM

## 2020-04-25 PROCEDURE — 83735 ASSAY OF MAGNESIUM: CPT

## 2020-04-25 PROCEDURE — 80048 BASIC METABOLIC PNL TOTAL CA: CPT

## 2020-04-25 PROCEDURE — A9270 NON-COVERED ITEM OR SERVICE: HCPCS | Performed by: INTERNAL MEDICINE

## 2020-04-25 PROCEDURE — A9270 NON-COVERED ITEM OR SERVICE: HCPCS | Performed by: PSYCHIATRY & NEUROLOGY

## 2020-04-25 PROCEDURE — 84100 ASSAY OF PHOSPHORUS: CPT

## 2020-04-25 PROCEDURE — A9270 NON-COVERED ITEM OR SERVICE: HCPCS | Performed by: STUDENT IN AN ORGANIZED HEALTH CARE EDUCATION/TRAINING PROGRAM

## 2020-04-25 PROCEDURE — 97116 GAIT TRAINING THERAPY: CPT | Mod: CQ

## 2020-04-25 PROCEDURE — 97530 THERAPEUTIC ACTIVITIES: CPT | Mod: CQ

## 2020-04-25 PROCEDURE — 700102 HCHG RX REV CODE 250 W/ 637 OVERRIDE(OP): Performed by: PSYCHIATRY & NEUROLOGY

## 2020-04-25 PROCEDURE — 99232 SBSQ HOSP IP/OBS MODERATE 35: CPT | Mod: GC | Performed by: INTERNAL MEDICINE

## 2020-04-25 PROCEDURE — 36415 COLL VENOUS BLD VENIPUNCTURE: CPT

## 2020-04-25 PROCEDURE — 700101 HCHG RX REV CODE 250: Performed by: INTERNAL MEDICINE

## 2020-04-25 PROCEDURE — 770020 HCHG ROOM/CARE - TELE (206)

## 2020-04-25 RX ORDER — CYCLOBENZAPRINE HCL 10 MG
10 TABLET ORAL 3 TIMES DAILY PRN
Status: DISCONTINUED | OUTPATIENT
Start: 2020-04-25 | End: 2020-04-28 | Stop reason: HOSPADM

## 2020-04-25 RX ORDER — ANALGESIC BALM 1.74; 4.06 G/29G; G/29G
OINTMENT TOPICAL 3 TIMES DAILY PRN
Status: DISCONTINUED | OUTPATIENT
Start: 2020-04-25 | End: 2020-04-28 | Stop reason: HOSPADM

## 2020-04-25 RX ADMIN — AMLODIPINE BESYLATE 10 MG: 10 TABLET ORAL at 05:08

## 2020-04-25 RX ADMIN — ACETAMINOPHEN 650 MG: 325 TABLET, FILM COATED ORAL at 22:48

## 2020-04-25 RX ADMIN — SODIUM CHLORIDE: 3 INJECTION, SOLUTION INTRAVENOUS at 17:21

## 2020-04-25 RX ADMIN — CARVEDILOL 25 MG: 6.25 TABLET, FILM COATED ORAL at 08:17

## 2020-04-25 RX ADMIN — Medication 4 G: at 17:20

## 2020-04-25 RX ADMIN — ACETAMINOPHEN 650 MG: 325 TABLET, FILM COATED ORAL at 15:58

## 2020-04-25 RX ADMIN — SENNOSIDES AND DOCUSATE SODIUM 2 TABLET: 8.6; 5 TABLET ORAL at 05:08

## 2020-04-25 RX ADMIN — HYDROXYZINE HYDROCHLORIDE 50 MG: 50 TABLET, FILM COATED ORAL at 03:06

## 2020-04-25 RX ADMIN — FLUDROCORTISONE ACETATE 0.1 MG: 0.1 TABLET ORAL at 05:08

## 2020-04-25 RX ADMIN — Medication 4 G: at 12:25

## 2020-04-25 RX ADMIN — HYDRALAZINE HYDROCHLORIDE 25 MG: 25 TABLET, FILM COATED ORAL at 17:20

## 2020-04-25 RX ADMIN — CARVEDILOL 25 MG: 6.25 TABLET, FILM COATED ORAL at 17:20

## 2020-04-25 RX ADMIN — HYDRALAZINE HYDROCHLORIDE 25 MG: 25 TABLET, FILM COATED ORAL at 12:26

## 2020-04-25 RX ADMIN — POTASSIUM PHOSPHATE, MONOBASIC AND POTASSIUM PHOSPHATE, DIBASIC 30 MMOL: 224; 236 INJECTION, SOLUTION, CONCENTRATE INTRAVENOUS at 09:28

## 2020-04-25 RX ADMIN — Medication 4 G: at 08:17

## 2020-04-25 RX ADMIN — LISINOPRIL 40 MG: 20 TABLET ORAL at 09:31

## 2020-04-25 RX ADMIN — ACETAMINOPHEN 650 MG: 325 TABLET, FILM COATED ORAL at 05:08

## 2020-04-25 RX ADMIN — HYDRALAZINE HYDROCHLORIDE 25 MG: 25 TABLET, FILM COATED ORAL at 05:09

## 2020-04-25 RX ADMIN — SODIUM CHLORIDE: 3 INJECTION, SOLUTION INTRAVENOUS at 05:08

## 2020-04-25 ASSESSMENT — ENCOUNTER SYMPTOMS
EYE PAIN: 0
NERVOUS/ANXIOUS: 0
WEAKNESS: 0
COUGH: 0
FALLS: 0
SPUTUM PRODUCTION: 0
DIARRHEA: 0
SINUS PAIN: 0
TREMORS: 0
BLURRED VISION: 0
ABDOMINAL PAIN: 0
CONSTIPATION: 0
SORE THROAT: 0
BACK PAIN: 0
FOCAL WEAKNESS: 0
PHOTOPHOBIA: 0
HALLUCINATIONS: 0
SEIZURES: 0
SHORTNESS OF BREATH: 0
SENSORY CHANGE: 0
DIZZINESS: 0
LOSS OF CONSCIOUSNESS: 0
NECK PAIN: 0
DOUBLE VISION: 0
VOMITING: 0
PALPITATIONS: 0
TINGLING: 0
NAUSEA: 0
HEMOPTYSIS: 0
FEVER: 0
SPEECH CHANGE: 0
MYALGIAS: 0
HEADACHES: 0
DEPRESSION: 0
CHILLS: 0
BRUISES/BLEEDS EASILY: 0
HEARTBURN: 0

## 2020-04-25 ASSESSMENT — COGNITIVE AND FUNCTIONAL STATUS - GENERAL
STANDING UP FROM CHAIR USING ARMS: A LITTLE
SUGGESTED CMS G CODE MODIFIER MOBILITY: CJ
CLIMB 3 TO 5 STEPS WITH RAILING: A LOT
MOBILITY SCORE: 20
WALKING IN HOSPITAL ROOM: A LITTLE

## 2020-04-25 ASSESSMENT — GAIT ASSESSMENTS
GAIT LEVEL OF ASSIST: MINIMAL ASSIST
ASSISTIVE DEVICE: FRONT WHEEL WALKER
DISTANCE (FEET): 80
DEVIATION: DECREASED BASE OF SUPPORT;STEP TO

## 2020-04-25 NOTE — PROGRESS NOTES
Received handoff report from day shift RN. The pt. is A&Ox4, with no c/o pain and states no needs. At this time 3% hypertonic NaCl is running @ 50ml/hr. At this time pt. has no c/o pain and states no needs.    LLE weakness r/t pain on movement. L knee and L ankle are notable more swollen than R side, and are tender to the touch. Pt. using alternating heat and ice to alleviate pain.

## 2020-04-25 NOTE — PROGRESS NOTES
Neurosurgery Progress Note    Subjective:  Hospital Day #10 SAH  CTA/IR cerebral angio negative for aneurysm  BP control much improved  Serial scans show stable SAH  Na 131 this AM,   Without complaints.    Exam:  GCS 15  No drift,   Motor 5/5 and symmetric          BP  Min: 144/78  Max: 153/62  Pulse  Av.8  Min: 68  Max: 76  Resp  Av.5  Min: 16  Max: 20  Temp  Av.2 °C (99 °F)  Min: 37 °C (98.6 °F)  Max: 37.4 °C (99.3 °F)  SpO2  Av.5 %  Min: 95 %  Max: 96 %    No data recorded    Recent Labs     20  0230   WBC 12.0*   RBC 4.88   HEMOGLOBIN 13.0*   HEMATOCRIT 39.0*   MCV 79.9*   MCH 26.6*   MCHC 33.3*   RDW 38.5   PLATELETCT 283   MPV 9.0     Recent Labs     20  1515 20  2102 20  0335   SODIUM 134* 135 131*   POTASSIUM 3.8 3.6 4.0   CHLORIDE 102 103 100   CO2 18* 20 17*   GLUCOSE 130* 129* 126*   BUN 13 13 12   CREATININE 0.72 0.73 0.67   CALCIUM 7.9* 8.0* 8.2*               Intake/Output       20 0700 - 20 0659 20 0700 - 20 0659      0449-6552 6845-2416 Total 4053-6120 6977-6486 Total       Intake    P.O.  839  300 1139  --  -- --    P.O.  -- -- --    Total Intake  -- -- --       Output    Urine  575  1400   --  -- --    Number of Times Voided 2 x -- 2 x -- -- --    Urine Void (mL) 575 1400  -- -- --    Total Output 575 1400  -- -- --       Net I/O     264 -1100 -836 -- -- --            Intake/Output Summary (Last 24 hours) at 2020 0836  Last data filed at 2020 0600  Gross per 24 hour   Intake 902 ml   Output 1975 ml   Net -1073 ml            • cyclobenzaprine  10 mg TID PRN   • potassium phosphate ivpb  30 mmol Once   • fludrocortisone  0.1 mg QAM   • carvedilol  25 mg BID WITH MEALS   • hydrALAZINE  25 mg Q6HRS   • acetaminophen  650 mg Q4HRS PRN   • labetalol  10 mg Q4HRS PRN   • lisinopril  40 mg Q DAY   • fluticasone  2 Spray QDAY PRN   • sodium chloride  4 g TID WITH MEALS   •  carboxymethylcellulose  1-2 Drop PRN   • sodium chloride  2 Spray Q2HRS PRN   • amLODIPine  10 mg Q DAY   • hydrOXYzine HCl  50 mg TID PRN   • 3% sodium chloride   Continuous   • enalaprilat  0.625 mg Q6HRS PRN   • calcium carbonate  1,000 mg Q6HRS PRN   • hyoscyamine-maalox plus-lidocaine viscous  30 mL Once PRN   • senna-docusate  2 Tab BID    And   • polyethylene glycol/lytes  1 Packet QDAY PRN    And   • magnesium hydroxide  30 mL QDAY PRN    And   • bisacodyl  10 mg QDAY PRN   • Respiratory Therapy Consult   Continuous RT   • ondansetron  4 mg Q4HRS PRN    Or   • ondansetron  4 mg Q4HRS PRN       Assessment and Plan:  Hospital day #10  Non-aneurysmal SAH  Appreciate hospitalist/Intensivist care.  SBP to be <160mmHg  Na goals of 135-145  Q 4hr neuro checks   PT/OT  Xanax prn for anxiety  Pt needs repeat CTA in 6 weeks  Will f/u in [place] with [facility] when Na stable   Answered all questions  No neurosurgical interventions planned.  Neurosurgery signing off.  Contact neurosurgery prn      Prophylactic anticoagulation: no         Start date/time:

## 2020-04-25 NOTE — PROGRESS NOTES
Daily Progress Note:     Date of Service: 4/25/2020  Primary Team: [facility] Green Team   Attending: Tono Graham M.D.   Senior Resident: Dr. Macias  Intern: Dr. Araya  Contact:  [phone]    Chief Complaint:   Chief Complaint   Patient presents with   • Headache     Posterior HA started at 1730 today with posterior neck pain   • Neck Pain     with associated nausea       ID: 60-year-old male with PMH HTN initially presented 4/16/2020 with SAH and hypertensive emergency, was admitted to ICU on nicardipine drip, nimodipine and Vasotec.  No source of bleed or aneurysm was identified on cerebral angiography, and patient did not require any neurosurgical intervention.  Was transferred to floor 4/18/2020 where he continued to have resistant hypertension, however had to be transferred back to the ICU on 4/20/20 in view of cerebral salt wasting requiring hypertonic saline drip.    ICU course: Patient continued to have resistant hyponatremia, finally sodium plateaued at 129-130 on 50 cc/hour hypertonic saline, salt tablets 4 g 3 times daily with addition of fludrocortisone.  Patient had to be restarted on nicardipine drip for uncontrolled hypertension, and was eventually able to be weaned off with blood pressure controlled with oral antihypertensives.  4/23/2020: Transferred to floor    Subjective:   Patient complaining of left ankle swelling and pain.  Denies any headache, focal neurological deficits, calf pain, chest pain, shortness of breath, palpitations.    Interval updates:  -Afebrile in the past 24 hours.  -Blood pressure in 140s to 150s/60s to 70s  -Sodium ranged between 130-135 in the past 24 hours.  131 this a.m. we will continue the hypertonic saline for 1 more day, and attempt to wean off the drip tomorrow.  -Patient does have some swelling, tenderness and left ankle and left knee.  This started after stay in the ICU where his bed was small and his feet would get cramped.  No history of trauma to the left  lower extremity.  There is no calf swelling, swelling tenderness or erythema.  Yvette sign is negative.  Per patient he has had multiple sports related injuries in the past, and keeps having recurrent swellings in his ankle and knee when he works around the house due to arthritis.  Do not suspect deep venous thrombosis or fracture at this point, will not image.  We will continue ice packs, bengay and leg elevation.  -PT recommended outpatient physical therapy    Consultants/Specialty:  Neurosurgery  Review of Systems:    Review of Systems   Constitutional: Negative for chills and fever.   HENT: Negative for congestion, hearing loss, sinus pain and sore throat.    Eyes: Negative for blurred vision, double vision, photophobia and pain.   Respiratory: Negative for cough, hemoptysis, sputum production and shortness of breath.    Cardiovascular: Negative for chest pain, palpitations and leg swelling.   Gastrointestinal: Negative for abdominal pain, constipation, diarrhea, heartburn, nausea and vomiting.   Genitourinary: Negative for dysuria and urgency.   Musculoskeletal: Positive for joint pain (Left ankle swelling and pain). Negative for back pain, falls, myalgias and neck pain.   Skin: Negative for rash.   Neurological: Negative for dizziness, tingling, tremors, sensory change, speech change, focal weakness, seizures, loss of consciousness, weakness and headaches.   Endo/Heme/Allergies: Does not bruise/bleed easily.   Psychiatric/Behavioral: Negative for depression, hallucinations and suicidal ideas. The patient is not nervous/anxious.        Objective Data:   Physical Exam:   Vitals:   Temp:  [37 °C (98.6 °F)-37.4 °C (99.3 °F)] 37.3 °C (99.1 °F)  Pulse:  [68-76] 71  Resp:  [16-20] 16  BP: (144-153)/(62-78) 153/62  SpO2:  [95 %-96 %] 95 %    Physical Exam  Constitutional:       General: He is not in acute distress.     Appearance: He is obese.   HENT:      Head: Normocephalic and atraumatic.      Nose: Nose normal.       Mouth/Throat:      Mouth: Mucous membranes are moist.      Pharynx: Oropharynx is clear.   Eyes:      Extraocular Movements: Extraocular movements intact.      Conjunctiva/sclera: Conjunctivae normal.      Pupils: Pupils are equal, round, and reactive to light.   Neck:      Musculoskeletal: Normal range of motion and neck supple. No neck rigidity.   Cardiovascular:      Rate and Rhythm: Normal rate and regular rhythm.      Pulses: Normal pulses.      Heart sounds: Normal heart sounds. No murmur. No friction rub. No gallop.    Pulmonary:      Effort: Pulmonary effort is normal.      Breath sounds: Normal breath sounds. No wheezing or rales.   Abdominal:      General: Bowel sounds are normal. There is no distension.      Palpations: Abdomen is soft.      Tenderness: There is no abdominal tenderness. There is no guarding.   Musculoskeletal: Normal range of motion.         General: Swelling and tenderness present.      Right lower leg: No edema.      Left lower leg: No edema.      Comments: Left ankle swelling and tenderness.  Range of motion intact.  Sensation intact.  Distal pulses palpable.  Cap refill less than 2 seconds.  No left, swelling or tenderness.  Yvette signs negative.   Skin:     General: Skin is warm and dry.      Capillary Refill: Capillary refill takes less than 2 seconds.   Neurological:      General: No focal deficit present.      Mental Status: He is alert and oriented to person, place, and time.      Cranial Nerves: No cranial nerve deficit.      Sensory: No sensory deficit.      Motor: No weakness.      Coordination: Coordination normal.      Gait: Gait normal.   Psychiatric:         Mood and Affect: Mood normal.         Thought Content: Thought content does not include homicidal or suicidal ideation.         Judgment: Judgment normal.         Quality Measures  Quality-Core Measures   Reviewed items::  Labs reviewed and Medications reviewed  Cardona catheter::  No Cardona  DVT prophylaxis  pharmacological::  Contraindicated - High bleeding risk  DVT prophylaxis - mechanical:  SCDs  Ulcer Prophylaxis::  Yes  Assessed for rehabilitation services:  Patient was assess for and/or received rehabilitation services during this hospitalization      Labs:   Recent Labs     04/23/20  0230   WBC 12.0*   RBC 4.88   HEMOGLOBIN 13.0*   HEMATOCRIT 39.0*   MCV 79.9*   MCH 26.6*   RDW 38.5   PLATELETCT 283   MPV 9.0   NEUTSPOLYS 71.00   LYMPHOCYTES 10.40*   MONOCYTES 17.20*   EOSINOPHILS 0.40   BASOPHILS 0.40      Recent Labs     04/24/20 2102 04/25/20  0335 04/25/20  0913   SODIUM 135 131* 134*   POTASSIUM 3.6 4.0 3.3*   CHLORIDE 103 100 101   CO2 20 17* 20   GLUCOSE 129* 126* 122*   BUN 13 12 11      Recent Labs     04/24/20 2102 04/25/20 0335 04/25/20  0913   CREATININE 0.73 0.67 0.65      Lab Results   Component Value Date/Time    PROTHROMBTM 13.3 04/17/2020 02:55 AM    INR 0.99 04/17/2020 02:55 AM         Imaging:   US-RENAL ARTERY DUPLEX COMP   Final Result      IR-CAROTID-CEREBRAL BILATERAL   Final Result         1.  Normal cerebral arteriogram. No evidence of aneurysm.   2.  The origin of the left posterior inferior cerebellar artery is cut off on the lateral view and largely obscured on the AP view due to overlapping structures. This was not noticed during the procedure, only at the time of final interpretation.    Attention to this location on follow-up CTA or MRA is recommended.      CT-CTA NECK WITH & W/O-POST PROCESSING   Final Result      Mild carotid and vertebral artery atherosclerosis without significant stenosis, aneurysm or occlusion      Mild bronchial wall thickening and groundglass could indicate edema. Reactive airways disease/infection is not excluded      CT-CTA HEAD WITH & W/O-POST PROCESS   Final Result      CT angiogram of the [place] is unable to identify source of subarachnoid hemorrhage      Slight new visualization of acute subarachnoid hemorrhage in the lateral ventricles  and there is slight temporal lobe dilatation. Attention in follow-up is advised      Otherwise stable moderate acute subarachnoid hemorrhage filling the basal cisterns as above      OUTSIDE IMAGES-CT HEAD   Final Result           * Hyponatremia  Assessment & Plan  Due to cerebral salt wasting syndrome. Stabilized on hypertonic saline.    -Continue fludrocortisone   -Continue salt tabs 4g TID  -Continue hypertonic saline stable rate, will attempt to down titrate as tolerated  -Continue free water restriction   -Close monitoring      Hypertensive emergency- (present on admission)  Assessment & Plan  The patient initially presented with a BP of 188/135. This may be contributing to SAH.   Continued hypertension requiring nicardipine, titration of oral antihypertensives.  Renal artery duplex was negative for renal artery stenosis.Aldosterone and plasma renin Wnl.  Patient also has anxiety, and suffers from insomnia.  This could potentially be contribute to his high blood pressure. Wife reports that he had been in a heated argument on the phone directly prior to onset of symptoms.  He does not want to take any narcotics, sedatives.  He also has a history of 2-3 drinks, hard liquor every day.     - Currently managed with amlodipine 10, Coreg 25 twice daily, hydralazine 25 every 6 hours, lisinopril 40 mg daily  - Titrate oral antihypertensives: increase carvedilol dose, if insufficient increase hydralazine dose  - PRN antihypertensives ordered: Vasotec, labetalol, hydralazine  - Goal SBP<160 per neurosurgery  - Close blood pressure monitoring    Non-aneurysmal perimesencephalic subarachnoid hemorrhage (HCC)- (present on admission)  Assessment & Plan  Presented on 4/16/2020 with neck stiffness, headaches, and nausea/vomiting; wife reports that he had been in a heated argument on the phone directly prior to onset of symptoms. Non-con CT showed a SAH. Subsequent CTA did not show an aneurysm or source of SAH (although the  patient does have a family history of aneurysms). He does have a history of hypertension, but was not on any anti-hypertensive medications prior to admission. He is on aspirin but no other anti-coagulation.  Cerebral Angiography: negative for aneurysm  C/b cerebral salt wasting syndrome.  [employer]   PT recommended outpatient physical therapy    - Elevate head of bed  - fall/seizure/aspiration precautions  - Holding aspirin   - SBP <160 per neurosurgery  - Stable from neurosurgery point  - Repeat CTA in 6 weeks, patient will follow in [place] with [facility]    Hypokalemia  Assessment & Plan  Monitor and replete prn    Alcohol abuse, daily use- (present on admission)  Assessment & Plan  The patient drinks alcohol daily (usually hard liquor, 2 to 3 glasses) and uses marijuana occasionally.   No evidence of withdrawal this admission.   - cessation counseling and education provided    Headache- (present on admission)  Assessment & Plan  2/2 SAH.  - prn tylenol  - consider gabapentin if persistent    Hypophosphatemia  Assessment & Plan  Monitor and replete prn    At risk for obstructive sleep apnea- (present on admission)  Assessment & Plan  STOP BANG score 7/8.  -Outpatient sleep study is indicated    Hypomagnesemia  Assessment & Plan  Monitor and replete prn    Pre-diabetes- (present on admission)  Assessment & Plan  A1c was 6.3.   - Continue SSI and hypoglycemia protocol  - Consider lifestyle modification counseling prior to discharge    Hyperlipidemia- (present on admission)  Assessment & Plan  The patient's LDL is elevated at 143 and total cholesterol is 209.   - Start statin when clinically appropriate      Please note that this dictation was created using voice recognition software. I have made every reasonable attempt to correct obvious errors, but there may be errors of grammar and possibly content that I did not discover before finalizing the note.

## 2020-04-25 NOTE — THERAPY
"Physical Therapy Treatment completed.   Bed Mobility:  Supine to Sit: Supervised  Transfers: Sit to Stand: Supervised  Gait: Level Of Assist: Minimal Assist with Front-Wheel Walker       Plan of Care: Will benefit from Physical Therapy 5 times per week  Discharge Recommendations: Equipment: Front-Wheel Walker. Recommend outpatient physical therapy services to address higher level deficits.    See \"Rehab Therapy-Acute\" Patient Summary Report for complete documentation.     Pt was pleasant, particular with movement but motivated. He was perseverating on pain and how pain was in L ankle and knee and now his hip or specificialyl per pt his piriformis. Pt reach EOB with SPV and bed rail. REviewed supine exercises and DF stretching he reported he has been performing them. Than started with seated laq and marches and progressed to standing marches prior to gait. He complete sit to stands with fww and SPV, only requriing cues for fww management. He ambulated 80ft with fww and hands on for safety. He continues to present with decrased weight on LLE during stance time but was able to achieve more foot flat today. Continues to rely heavily on BUE support. Educated on increasing mobility with nursing staff including up to chair and increased ambulation. Pt reporting pain was slightly better during gait. PRovided pt with ice pack and elevated his LLE. Per eval pt will require fww and recommend outpatient PT as needed. Will continue to follow while in house   "

## 2020-04-25 NOTE — CARE PLAN
Problem: Venous Thromboembolism (VTW)/Deep Vein Thrombosis (DVT) Prevention:  Goal: Patient will participate in Venous Thrombosis (VTE)/Deep Vein Thrombosis (DVT)Prevention Measures  Outcome: PROGRESSING AS EXPECTED  Flowsheets (Taken 4/24/2020 1900)  Risk Assessment Score: 2  VTE RISK: Moderate  Mechanical Prophylaxis: (RLE only LLE swelling ) SCDs, Sequential Compression Device  SCDs, Sequential Compression Device: (R only, LLE swolen ankle/knee) On  Pharmacologic Prophylaxis Used: Contraindicated per MD  Note: SAH VTE prevention is SCDs only.      Problem: Safety:  Goal: Will remain free from injury  Outcome: PROGRESSING AS EXPECTED  Note: Pt. at high risk for injury r/t SAH. Fall, seizure, aspiration precautions in place, bed alarm on.   Goal: Risk of aspiration will decrease  Outcome: PROGRESSING AS EXPECTED     Problem: Physical Regulation:  Goal: Ability to maintain clinical measurements within normal limits will improve  Outcome: PROGRESSING AS EXPECTED  Note: Pt. on 3% NaCl drip, Na is 135 @2100 this shift, and BP is controlled within parameters without PRNs at this time.

## 2020-04-25 NOTE — PROGRESS NOTES
Monitor summary: SR 64-78, WA .18, QRS .08, QT .32, with occasional PVCs per strip from monitor room.

## 2020-04-26 LAB
ANION GAP SERPL CALC-SCNC: 12 MMOL/L (ref 7–16)
ANION GAP SERPL CALC-SCNC: 13 MMOL/L (ref 7–16)
BUN SERPL-MCNC: 13 MG/DL (ref 8–22)
BUN SERPL-MCNC: 13 MG/DL (ref 8–22)
BUN SERPL-MCNC: 14 MG/DL (ref 8–22)
BUN SERPL-MCNC: 15 MG/DL (ref 8–22)
CALCIUM SERPL-MCNC: 7.9 MG/DL (ref 8.5–10.5)
CALCIUM SERPL-MCNC: 8 MG/DL (ref 8.5–10.5)
CALCIUM SERPL-MCNC: 8.1 MG/DL (ref 8.5–10.5)
CALCIUM SERPL-MCNC: 8.1 MG/DL (ref 8.5–10.5)
CHLORIDE SERPL-SCNC: 100 MMOL/L (ref 96–112)
CHLORIDE SERPL-SCNC: 102 MMOL/L (ref 96–112)
CHLORIDE SERPL-SCNC: 102 MMOL/L (ref 96–112)
CHLORIDE SERPL-SCNC: 103 MMOL/L (ref 96–112)
CO2 SERPL-SCNC: 21 MMOL/L (ref 20–33)
CO2 SERPL-SCNC: 22 MMOL/L (ref 20–33)
CREAT SERPL-MCNC: 0.72 MG/DL (ref 0.5–1.4)
CREAT SERPL-MCNC: 0.75 MG/DL (ref 0.5–1.4)
CREAT SERPL-MCNC: 0.77 MG/DL (ref 0.5–1.4)
CREAT SERPL-MCNC: 0.8 MG/DL (ref 0.5–1.4)
GLUCOSE SERPL-MCNC: 124 MG/DL (ref 65–99)
GLUCOSE SERPL-MCNC: 132 MG/DL (ref 65–99)
GLUCOSE SERPL-MCNC: 138 MG/DL (ref 65–99)
GLUCOSE SERPL-MCNC: 166 MG/DL (ref 65–99)
MAGNESIUM SERPL-MCNC: 2 MG/DL (ref 1.5–2.5)
PHOSPHATE SERPL-MCNC: 3.1 MG/DL (ref 2.5–4.5)
POTASSIUM SERPL-SCNC: 3.4 MMOL/L (ref 3.6–5.5)
POTASSIUM SERPL-SCNC: 3.4 MMOL/L (ref 3.6–5.5)
POTASSIUM SERPL-SCNC: 3.5 MMOL/L (ref 3.6–5.5)
POTASSIUM SERPL-SCNC: 3.5 MMOL/L (ref 3.6–5.5)
SODIUM SERPL-SCNC: 135 MMOL/L (ref 135–145)
SODIUM SERPL-SCNC: 136 MMOL/L (ref 135–145)

## 2020-04-26 PROCEDURE — 84100 ASSAY OF PHOSPHORUS: CPT

## 2020-04-26 PROCEDURE — A9270 NON-COVERED ITEM OR SERVICE: HCPCS | Performed by: PSYCHIATRY & NEUROLOGY

## 2020-04-26 PROCEDURE — 99232 SBSQ HOSP IP/OBS MODERATE 35: CPT | Mod: GC | Performed by: INTERNAL MEDICINE

## 2020-04-26 PROCEDURE — 700102 HCHG RX REV CODE 250 W/ 637 OVERRIDE(OP): Performed by: PSYCHIATRY & NEUROLOGY

## 2020-04-26 PROCEDURE — A9270 NON-COVERED ITEM OR SERVICE: HCPCS | Performed by: INTERNAL MEDICINE

## 2020-04-26 PROCEDURE — 83735 ASSAY OF MAGNESIUM: CPT

## 2020-04-26 PROCEDURE — 700102 HCHG RX REV CODE 250 W/ 637 OVERRIDE(OP): Performed by: INTERNAL MEDICINE

## 2020-04-26 PROCEDURE — 80048 BASIC METABOLIC PNL TOTAL CA: CPT | Mod: 91

## 2020-04-26 PROCEDURE — 36415 COLL VENOUS BLD VENIPUNCTURE: CPT

## 2020-04-26 PROCEDURE — 700102 HCHG RX REV CODE 250 W/ 637 OVERRIDE(OP): Performed by: STUDENT IN AN ORGANIZED HEALTH CARE EDUCATION/TRAINING PROGRAM

## 2020-04-26 PROCEDURE — A9270 NON-COVERED ITEM OR SERVICE: HCPCS | Performed by: STUDENT IN AN ORGANIZED HEALTH CARE EDUCATION/TRAINING PROGRAM

## 2020-04-26 PROCEDURE — 770020 HCHG ROOM/CARE - TELE (206)

## 2020-04-26 RX ORDER — HYDRALAZINE HYDROCHLORIDE 25 MG/1
25 TABLET, FILM COATED ORAL EVERY 8 HOURS
Status: DISCONTINUED | OUTPATIENT
Start: 2020-04-26 | End: 2020-04-28 | Stop reason: HOSPADM

## 2020-04-26 RX ADMIN — LISINOPRIL 40 MG: 20 TABLET ORAL at 10:06

## 2020-04-26 RX ADMIN — ACETAMINOPHEN 650 MG: 325 TABLET, FILM COATED ORAL at 22:43

## 2020-04-26 RX ADMIN — CARVEDILOL 25 MG: 6.25 TABLET, FILM COATED ORAL at 17:15

## 2020-04-26 RX ADMIN — HYDRALAZINE HYDROCHLORIDE 25 MG: 25 TABLET, FILM COATED ORAL at 22:43

## 2020-04-26 RX ADMIN — ACETAMINOPHEN 650 MG: 325 TABLET, FILM COATED ORAL at 03:23

## 2020-04-26 RX ADMIN — HYDRALAZINE HYDROCHLORIDE 25 MG: 25 TABLET, FILM COATED ORAL at 05:32

## 2020-04-26 RX ADMIN — FLUDROCORTISONE ACETATE 0.1 MG: 0.1 TABLET ORAL at 05:32

## 2020-04-26 RX ADMIN — ACETAMINOPHEN 650 MG: 325 TABLET, FILM COATED ORAL at 11:48

## 2020-04-26 RX ADMIN — Medication 4 G: at 17:15

## 2020-04-26 RX ADMIN — HYDRALAZINE HYDROCHLORIDE 25 MG: 25 TABLET, FILM COATED ORAL at 00:48

## 2020-04-26 RX ADMIN — ACETAMINOPHEN 650 MG: 325 TABLET, FILM COATED ORAL at 07:43

## 2020-04-26 RX ADMIN — Medication 4 G: at 11:48

## 2020-04-26 RX ADMIN — CARBOXYMETHYLCELLULOSE SODIUM 2 DROP: 5 SOLUTION/ DROPS OPHTHALMIC at 20:02

## 2020-04-26 RX ADMIN — ACETAMINOPHEN 650 MG: 325 TABLET, FILM COATED ORAL at 17:15

## 2020-04-26 RX ADMIN — CARVEDILOL 25 MG: 6.25 TABLET, FILM COATED ORAL at 07:43

## 2020-04-26 RX ADMIN — Medication 2 SPRAY: at 18:10

## 2020-04-26 RX ADMIN — HYDRALAZINE HYDROCHLORIDE 25 MG: 25 TABLET, FILM COATED ORAL at 14:00

## 2020-04-26 RX ADMIN — AMLODIPINE BESYLATE 10 MG: 10 TABLET ORAL at 05:32

## 2020-04-26 RX ADMIN — Medication 4 G: at 07:43

## 2020-04-26 ASSESSMENT — ENCOUNTER SYMPTOMS
HALLUCINATIONS: 0
DIZZINESS: 0
SPEECH CHANGE: 0
EYE PAIN: 0
NECK PAIN: 0
NERVOUS/ANXIOUS: 0
ABDOMINAL PAIN: 0
BLURRED VISION: 0
LOSS OF CONSCIOUSNESS: 0
NAUSEA: 0
SHORTNESS OF BREATH: 0
SPUTUM PRODUCTION: 0
DEPRESSION: 0
SINUS PAIN: 0
TREMORS: 0
FALLS: 0
MYALGIAS: 0
VOMITING: 0
COUGH: 0
HEMOPTYSIS: 0
HEADACHES: 0
DOUBLE VISION: 0
SEIZURES: 0
TINGLING: 0
CONSTIPATION: 0
CHILLS: 0
WEAKNESS: 0
SENSORY CHANGE: 0
SORE THROAT: 0
PALPITATIONS: 0
BACK PAIN: 0
HEARTBURN: 0
FEVER: 0
PHOTOPHOBIA: 0
FOCAL WEAKNESS: 0
DIARRHEA: 0
BRUISES/BLEEDS EASILY: 0

## 2020-04-26 ASSESSMENT — PATIENT HEALTH QUESTIONNAIRE - PHQ9
1. LITTLE INTEREST OR PLEASURE IN DOING THINGS: NOT AT ALL
SUM OF ALL RESPONSES TO PHQ9 QUESTIONS 1 AND 2: 0
2. FEELING DOWN, DEPRESSED, IRRITABLE, OR HOPELESS: NOT AT ALL

## 2020-04-26 NOTE — PROGRESS NOTES
Daily Progress Note:     Date of Service: 4/26/2020  Primary Team: [facility] Green Team   Attending: Tono Graham M.D.   Senior Resident: Dr. Macias  Intern: Dr. Araya  Contact:  [phone]    Chief Complaint:   Chief Complaint   Patient presents with   • Headache     Posterior HA started at 1730 today with posterior neck pain   • Neck Pain     with associated nausea       ID: 60-year-old male with PMH HTN initially presented 4/16/2020 with SAH and hypertensive emergency, was admitted to ICU on nicardipine drip, nimodipine and Vasotec.  No source of bleed or aneurysm was identified on cerebral angiography, and patient did not require any neurosurgical intervention.  Was transferred to floor 4/18/2020 where he continued to have resistant hypertension, however had to be transferred back to the ICU on 4/20/20 in view of cerebral salt wasting requiring hypertonic saline drip.    ICU course: Patient continued to have resistant hyponatremia, finally sodium plateaued at 129-130 on 50 cc/hour hypertonic saline, salt tablets 4 g 3 times daily with addition of fludrocortisone.  Patient had to be restarted on nicardipine drip for uncontrolled hypertension, and was eventually able to be weaned off with blood pressure controlled with oral antihypertensives.  4/23/2020: Transferred to floor    Subjective:   Left ankle pain and swelling improved from yesterday.  Patient comfortable in bed.    Interval updates:  -Sodium in the past 24 hours ranged between 131-138.  Discontinued hypertonic saline this a.m..  -We will continue to trend sodium every 6 hourly, if sodium remains more than 135, will discontinue fludrocortisone.  If sodium falls to less than 130, will restart hypertonic saline.  -Blood pressure systolics between 130-1 50.  Decrease hydralazine to 25 mg 3 times daily  -PT recommended outpatient physical therapy  -Potential discharge tomorrow, depending on sodium trend and blood  pressure    Consultants/Specialty:  Neurosurgery  Review of Systems:    Review of Systems   Constitutional: Negative for chills and fever.   HENT: Negative for congestion, hearing loss, sinus pain and sore throat.    Eyes: Negative for blurred vision, double vision, photophobia and pain.   Respiratory: Negative for cough, hemoptysis, sputum production and shortness of breath.    Cardiovascular: Negative for chest pain, palpitations and leg swelling.   Gastrointestinal: Negative for abdominal pain, constipation, diarrhea, heartburn, nausea and vomiting.   Genitourinary: Negative for dysuria and urgency.   Musculoskeletal: Positive for joint pain (Left ankle swelling and pain: improving). Negative for back pain, falls, myalgias and neck pain.   Skin: Negative for rash.   Neurological: Negative for dizziness, tingling, tremors, sensory change, speech change, focal weakness, seizures, loss of consciousness, weakness and headaches.   Endo/Heme/Allergies: Does not bruise/bleed easily.   Psychiatric/Behavioral: Negative for depression, hallucinations and suicidal ideas. The patient is not nervous/anxious.        Objective Data:   Physical Exam:   Vitals:   Temp:  [36.8 °C (98.2 °F)-37.6 °C (99.7 °F)] 36.8 °C (98.2 °F)  Pulse:  [66-78] 69  Resp:  [16-18] 18  BP: (135-158)/(58-73) 154/65  SpO2:  [93 %-96 %] 95 %    Physical Exam  Constitutional:       General: He is not in acute distress.     Appearance: He is obese.   HENT:      Head: Normocephalic and atraumatic.      Nose: Nose normal.      Mouth/Throat:      Mouth: Mucous membranes are moist.      Pharynx: Oropharynx is clear.   Eyes:      Extraocular Movements: Extraocular movements intact.      Conjunctiva/sclera: Conjunctivae normal.      Pupils: Pupils are equal, round, and reactive to light.   Neck:      Musculoskeletal: Normal range of motion and neck supple. No neck rigidity.   Cardiovascular:      Rate and Rhythm: Normal rate and regular rhythm.      Pulses:  Normal pulses.      Heart sounds: Normal heart sounds. No murmur. No friction rub. No gallop.    Pulmonary:      Effort: Pulmonary effort is normal.      Breath sounds: Normal breath sounds. No wheezing or rales.   Abdominal:      General: Bowel sounds are normal. There is no distension.      Palpations: Abdomen is soft.      Tenderness: There is no abdominal tenderness. There is no guarding.   Musculoskeletal: Normal range of motion.         General: Swelling and tenderness present.      Right lower leg: No edema.      Left lower leg: No edema.      Comments: Left ankle swelling and tenderness improving.  Range of motion intact.  Sensation intact.  Distal pulses palpable.  Cap refill less than 2 seconds.  No left, swelling or tenderness.  Yvette signs negative.   Skin:     General: Skin is warm and dry.      Capillary Refill: Capillary refill takes less than 2 seconds.   Neurological:      General: No focal deficit present.      Mental Status: He is alert and oriented to person, place, and time.      Cranial Nerves: No cranial nerve deficit.      Sensory: No sensory deficit.      Motor: No weakness.      Coordination: Coordination normal.      Gait: Gait normal.   Psychiatric:         Mood and Affect: Mood normal.         Thought Content: Thought content does not include homicidal or suicidal ideation.         Judgment: Judgment normal.         Quality Measures  Quality-Core Measures   Reviewed items::  Labs reviewed and Medications reviewed  Cardona catheter::  No Cardona  DVT prophylaxis pharmacological::  Contraindicated - High bleeding risk  DVT prophylaxis - mechanical:  SCDs  Ulcer Prophylaxis::  Yes  Assessed for rehabilitation services:  Patient was assess for and/or received rehabilitation services during this hospitalization      Labs:   No results for input(s): WBC, RBC, HEMOGLOBIN, HEMATOCRIT, MCV, MCH, RDW, PLATELETCT, MPV, NEUTSPOLYS, LYMPHOCYTES, MONOCYTES, EOSINOPHILS, BASOPHILS, RBCMORPHOLO in the  last 72 hours.   Recent Labs     04/25/20 2051 04/26/20  0254 04/26/20  0856   SODIUM 138 135 135   POTASSIUM 3.4* 3.4* 3.4*   CHLORIDE 105 102 102   CO2 20 21 21   GLUCOSE 142* 132* 138*   BUN 14 13 13      Recent Labs     04/25/20 2051 04/26/20  0254 04/26/20  0856   CREATININE 0.84 0.80 0.72      Lab Results   Component Value Date/Time    PROTHROMBTM 13.3 04/17/2020 02:55 AM    INR 0.99 04/17/2020 02:55 AM         Imaging:   US-RENAL ARTERY DUPLEX COMP   Final Result      IR-CAROTID-CEREBRAL BILATERAL   Final Result         1.  Normal cerebral arteriogram. No evidence of aneurysm.   2.  The origin of the left posterior inferior cerebellar artery is cut off on the lateral view and largely obscured on the AP view due to overlapping structures. This was not noticed during the procedure, only at the time of final interpretation.    Attention to this location on follow-up CTA or MRA is recommended.      CT-CTA NECK WITH & W/O-POST PROCESSING   Final Result      Mild carotid and vertebral artery atherosclerosis without significant stenosis, aneurysm or occlusion      Mild bronchial wall thickening and groundglass could indicate edema. Reactive airways disease/infection is not excluded      CT-CTA HEAD WITH & W/O-POST PROCESS   Final Result      CT angiogram of the [place] is unable to identify source of subarachnoid hemorrhage      Slight new visualization of acute subarachnoid hemorrhage in the lateral ventricles and there is slight temporal lobe dilatation. Attention in follow-up is advised      Otherwise stable moderate acute subarachnoid hemorrhage filling the basal cisterns as above      OUTSIDE IMAGES-CT HEAD   Final Result           * Hyponatremia  Assessment & Plan  Due to cerebral salt wasting syndrome. Stabilized on hypertonic saline.  4/26 Sodium in the past 24 hours ranged between 131-138.      -Discontinued hypertonic saline this a.m..  -We will continue to trend sodium every 6 hourly, if sodium  remains more than 135, will discontinue fludrocortisone.  If sodium falls to less than 130, will restart hypertonic saline.  -Continue fludrocortisone   -Continue salt tabs 4g TID  -Continue free water restriction   -Close monitoring      Hypertensive emergency- (present on admission)  Assessment & Plan  The patient initially presented with a BP of 188/135. This may be contributing to SAH.   Continued hypertension requiring nicardipine, titration of oral antihypertensives.  Renal artery duplex was negative for renal artery stenosis.Aldosterone and plasma renin Wnl.  Patient also has anxiety, and suffers from insomnia.  This could potentially be contribute to his high blood pressure. Wife reports that he had been in a heated argument on the phone directly prior to onset of symptoms.  He does not want to take any narcotics, sedatives.  He also has a history of 2-3 drinks, hard liquor every day.   Blood pressure systolics between 130-1 50.     - Decrease hydralazine to 25 mg 3 times daily  - Currently managed with amlodipine 10, Coreg 25 twice daily, lisinopril 40 mg daily  - Titrate oral antihypertensives: increase carvedilol dose, if insufficient increase hydralazine dose  - PRN antihypertensives ordered: Vasotec, labetalol, hydralazine  - Goal SBP<160 per neurosurgery  - Close blood pressure monitoring    Non-aneurysmal perimesencephalic subarachnoid hemorrhage (HCC)- (present on admission)  Assessment & Plan  Presented on 4/16/2020 with neck stiffness, headaches, and nausea/vomiting; wife reports that he had been in a heated argument on the phone directly prior to onset of symptoms. Non-con CT showed a SAH. Subsequent CTA did not show an aneurysm or source of SAH (although the patient does have a family history of aneurysms). He does have a history of hypertension, but was not on any anti-hypertensive medications prior to admission. He is on aspirin but no other anti-coagulation.  Cerebral Angiography: negative for  aneurysm  C/b cerebral salt wasting syndrome.  [employer]   PT recommended outpatient physical therapy    - Elevate head of bed  - fall/seizure/aspiration precautions  - Holding aspirin   - SBP <160 per neurosurgery  - Stable from neurosurgery point  - Repeat CTA in 6 weeks, patient will follow in [place] with [facility]    Hypokalemia  Assessment & Plan  Monitor and replete prn    Alcohol abuse, daily use- (present on admission)  Assessment & Plan  The patient drinks alcohol daily (usually hard liquor, 2 to 3 glasses) and uses marijuana occasionally.   No evidence of withdrawal this admission.   - cessation counseling and education provided    Headache- (present on admission)  Assessment & Plan  2/2 SAH.  - prn tylenol  - consider gabapentin if persistent    Hypophosphatemia  Assessment & Plan  Monitor and replete prn    At risk for obstructive sleep apnea- (present on admission)  Assessment & Plan  STOP BANG score 7/8.  -Outpatient sleep study is indicated    Hypomagnesemia  Assessment & Plan  Monitor and replete prn    Pre-diabetes- (present on admission)  Assessment & Plan  A1c was 6.3.   - Continue SSI and hypoglycemia protocol  - Consider lifestyle modification counseling prior to discharge    Hyperlipidemia- (present on admission)  Assessment & Plan  The patient's LDL is elevated at 143 and total cholesterol is 209.   - Start statin when clinically appropriate      Please note that this dictation was created using voice recognition software. I have made every reasonable attempt to correct obvious errors, but there may be errors of grammar and possibly content that I did not discover before finalizing the note.

## 2020-04-26 NOTE — CARE PLAN
Problem: Venous Thromboembolism (VTW)/Deep Vein Thrombosis (DVT) Prevention:  Goal: Patient will participate in Venous Thrombosis (VTE)/Deep Vein Thrombosis (DVT)Prevention Measures  Outcome: PROGRESSING AS EXPECTED     Problem: Pain Management  Goal: Pain level will decrease to patient's comfort goal  Outcome: PROGRESSING AS EXPECTED     Problem: Mobility  Goal: Risk for activity intolerance will decrease  Outcome: PROGRESSING AS EXPECTED     Problem: Safety:  Goal: Will remain free from injury  Outcome: PROGRESSING AS EXPECTED  Note: Pt admitted for stroke. Pt has remained free from falls this shift. Will continue to monitor.

## 2020-04-26 NOTE — PROGRESS NOTES
Monitor MD:0.18 QRS: 0.08 QT: 0.36 NSR Pt had sinus rhythm with trigeminal PVC's  Then 1 PVC and a couplet all unifocal. In afternoon pt was NSR.

## 2020-04-26 NOTE — PROGRESS NOTES
Monitor Summary: SR 64-76, NH .20, QRS .08, QT .38 with rare PVC & PAC per strip from monitor room

## 2020-04-27 LAB
ANION GAP SERPL CALC-SCNC: 10 MMOL/L (ref 7–16)
ANION GAP SERPL CALC-SCNC: 11 MMOL/L (ref 7–16)
ANION GAP SERPL CALC-SCNC: 14 MMOL/L (ref 7–16)
BUN SERPL-MCNC: 10 MG/DL (ref 8–22)
BUN SERPL-MCNC: 11 MG/DL (ref 8–22)
BUN SERPL-MCNC: 15 MG/DL (ref 8–22)
CALCIUM SERPL-MCNC: 8.4 MG/DL (ref 8.5–10.5)
CALCIUM SERPL-MCNC: 8.4 MG/DL (ref 8.5–10.5)
CALCIUM SERPL-MCNC: 8.9 MG/DL (ref 8.5–10.5)
CHLORIDE SERPL-SCNC: 104 MMOL/L (ref 96–112)
CHLORIDE SERPL-SCNC: 99 MMOL/L (ref 96–112)
CHLORIDE SERPL-SCNC: 99 MMOL/L (ref 96–112)
CO2 SERPL-SCNC: 22 MMOL/L (ref 20–33)
CO2 SERPL-SCNC: 23 MMOL/L (ref 20–33)
CO2 SERPL-SCNC: 23 MMOL/L (ref 20–33)
CREAT SERPL-MCNC: 0.69 MG/DL (ref 0.5–1.4)
CREAT SERPL-MCNC: 0.72 MG/DL (ref 0.5–1.4)
CREAT SERPL-MCNC: 0.84 MG/DL (ref 0.5–1.4)
GLUCOSE SERPL-MCNC: 125 MG/DL (ref 65–99)
GLUCOSE SERPL-MCNC: 138 MG/DL (ref 65–99)
GLUCOSE SERPL-MCNC: 175 MG/DL (ref 65–99)
POTASSIUM SERPL-SCNC: 3.4 MMOL/L (ref 3.6–5.5)
POTASSIUM SERPL-SCNC: 3.5 MMOL/L (ref 3.6–5.5)
POTASSIUM SERPL-SCNC: 3.7 MMOL/L (ref 3.6–5.5)
SODIUM SERPL-SCNC: 133 MMOL/L (ref 135–145)
SODIUM SERPL-SCNC: 135 MMOL/L (ref 135–145)
SODIUM SERPL-SCNC: 137 MMOL/L (ref 135–145)

## 2020-04-27 PROCEDURE — 99232 SBSQ HOSP IP/OBS MODERATE 35: CPT | Mod: GC | Performed by: HOSPITALIST

## 2020-04-27 PROCEDURE — 770020 HCHG ROOM/CARE - TELE (206)

## 2020-04-27 PROCEDURE — A9270 NON-COVERED ITEM OR SERVICE: HCPCS | Performed by: STUDENT IN AN ORGANIZED HEALTH CARE EDUCATION/TRAINING PROGRAM

## 2020-04-27 PROCEDURE — A9270 NON-COVERED ITEM OR SERVICE: HCPCS | Performed by: INTERNAL MEDICINE

## 2020-04-27 PROCEDURE — A9270 NON-COVERED ITEM OR SERVICE: HCPCS | Performed by: PSYCHIATRY & NEUROLOGY

## 2020-04-27 PROCEDURE — 700102 HCHG RX REV CODE 250 W/ 637 OVERRIDE(OP): Performed by: INTERNAL MEDICINE

## 2020-04-27 PROCEDURE — 80048 BASIC METABOLIC PNL TOTAL CA: CPT

## 2020-04-27 PROCEDURE — 700102 HCHG RX REV CODE 250 W/ 637 OVERRIDE(OP): Performed by: STUDENT IN AN ORGANIZED HEALTH CARE EDUCATION/TRAINING PROGRAM

## 2020-04-27 PROCEDURE — 700102 HCHG RX REV CODE 250 W/ 637 OVERRIDE(OP): Performed by: PSYCHIATRY & NEUROLOGY

## 2020-04-27 PROCEDURE — 97116 GAIT TRAINING THERAPY: CPT

## 2020-04-27 PROCEDURE — 36415 COLL VENOUS BLD VENIPUNCTURE: CPT

## 2020-04-27 RX ORDER — POTASSIUM CHLORIDE 20 MEQ/1
20 TABLET, EXTENDED RELEASE ORAL ONCE
Status: COMPLETED | OUTPATIENT
Start: 2020-04-27 | End: 2020-04-27

## 2020-04-27 RX ORDER — POTASSIUM CHLORIDE 20 MEQ/1
40 TABLET, EXTENDED RELEASE ORAL ONCE
Status: COMPLETED | OUTPATIENT
Start: 2020-04-27 | End: 2020-04-27

## 2020-04-27 RX ADMIN — FLUDROCORTISONE ACETATE 0.1 MG: 0.1 TABLET ORAL at 05:03

## 2020-04-27 RX ADMIN — POTASSIUM CHLORIDE 20 MEQ: 1500 TABLET, EXTENDED RELEASE ORAL at 09:12

## 2020-04-27 RX ADMIN — ACETAMINOPHEN 650 MG: 325 TABLET, FILM COATED ORAL at 05:03

## 2020-04-27 RX ADMIN — CARVEDILOL 25 MG: 6.25 TABLET, FILM COATED ORAL at 16:57

## 2020-04-27 RX ADMIN — ACETAMINOPHEN 650 MG: 325 TABLET, FILM COATED ORAL at 13:46

## 2020-04-27 RX ADMIN — HYDRALAZINE HYDROCHLORIDE 25 MG: 25 TABLET, FILM COATED ORAL at 13:46

## 2020-04-27 RX ADMIN — AMLODIPINE BESYLATE 10 MG: 10 TABLET ORAL at 05:03

## 2020-04-27 RX ADMIN — CARBOXYMETHYLCELLULOSE SODIUM 2 DROP: 5 SOLUTION/ DROPS OPHTHALMIC at 20:51

## 2020-04-27 RX ADMIN — CARVEDILOL 25 MG: 6.25 TABLET, FILM COATED ORAL at 07:53

## 2020-04-27 RX ADMIN — SENNOSIDES AND DOCUSATE SODIUM 2 TABLET: 8.6; 5 TABLET ORAL at 05:03

## 2020-04-27 RX ADMIN — LISINOPRIL 40 MG: 20 TABLET ORAL at 09:12

## 2020-04-27 RX ADMIN — Medication 4 G: at 12:16

## 2020-04-27 RX ADMIN — Medication 4 G: at 16:57

## 2020-04-27 RX ADMIN — ACETAMINOPHEN 650 MG: 325 TABLET, FILM COATED ORAL at 09:12

## 2020-04-27 RX ADMIN — POTASSIUM CHLORIDE 40 MEQ: 1500 TABLET, EXTENDED RELEASE ORAL at 07:53

## 2020-04-27 RX ADMIN — HYDRALAZINE HYDROCHLORIDE 25 MG: 25 TABLET, FILM COATED ORAL at 20:51

## 2020-04-27 RX ADMIN — HYDRALAZINE HYDROCHLORIDE 25 MG: 25 TABLET, FILM COATED ORAL at 05:03

## 2020-04-27 RX ADMIN — Medication 4 G: at 07:53

## 2020-04-27 ASSESSMENT — ENCOUNTER SYMPTOMS
COUGH: 0
BRUISES/BLEEDS EASILY: 0
SENSORY CHANGE: 0
DEPRESSION: 0
FOCAL WEAKNESS: 0
MYALGIAS: 0
ORTHOPNEA: 0
DIARRHEA: 0
SPUTUM PRODUCTION: 0
BACK PAIN: 0
SHORTNESS OF BREATH: 0
HEADACHES: 0
BLURRED VISION: 0
DOUBLE VISION: 0
CHILLS: 0
SPEECH CHANGE: 0
VOMITING: 0
NECK PAIN: 0
NAUSEA: 0
FEVER: 0
HEARTBURN: 0
DIZZINESS: 0
ABDOMINAL PAIN: 0

## 2020-04-27 ASSESSMENT — COGNITIVE AND FUNCTIONAL STATUS - GENERAL
MOBILITY SCORE: 21
CLIMB 3 TO 5 STEPS WITH RAILING: A LITTLE
STANDING UP FROM CHAIR USING ARMS: A LITTLE
WALKING IN HOSPITAL ROOM: A LITTLE
SUGGESTED CMS G CODE MODIFIER MOBILITY: CJ

## 2020-04-27 ASSESSMENT — GAIT ASSESSMENTS
ASSISTIVE DEVICE: FRONT WHEEL WALKER
DISTANCE (FEET): 500
GAIT LEVEL OF ASSIST: MODIFIED INDEPENDENT

## 2020-04-27 NOTE — CARE PLAN
Problem: Safety:  Goal: Will remain free from injury  Outcome: PROGRESSING AS EXPECTED     Problem: Knowledge Deficit:  Goal: Knowledge of disease process/condition, treatment plan, diagnostic tests, and medications will improve  Outcome: PROGRESSING AS EXPECTED

## 2020-04-27 NOTE — THERAPY
"Physical Therapy Treatment completed.   Bed Mobility:  Supine to Sit: Supervised  Transfers: Sit to Stand: Supervised  Gait: Level Of Assist: Supervision with Front-Wheel Walker       Plan of Care: Patient with no further skilled PT needs in the acute care setting at this time  Discharge Recommendations: Equipment: Front-Wheel Walker. Post-acute therapy Discharge to home with outpatient or home health for additional skilled therapy services.    Pt agreeable to work with therapy but still c/o 5/10 L ankle pain self attributed to \"fluid build-up.\" Pt performed all bed mobility and transfers with SPV. Pt ambulated with FWW and SPV 500ft+. Pt provided with verbal cues for reciprocal gait and increase in heel strike on L LE. Pt continuing to ambulate with step to pattern with limited ROM on L ankle. Pt reporting his L ankle is improving. Pt negotiated 3 steps with 1 HR and SPV. Therapist educated pt on guarding from family at discharge. Pt returned to room and transferred sitting>supine with SPV. Pt left in bed with L LE elevated and ice applied to ankle. RN aware. Pt needs a FWW for discharge. No more acute PT needs at this time, all goals met. Recommend outpatient or home health physical therapy services to address higher level deficits.      See \"Rehab Therapy-Acute\" Patient Summary Report for complete documentation.     Jayda Polo PT, DPT  Pager [phone]  "

## 2020-04-27 NOTE — PROGRESS NOTES
Received handoff report from day shift RN. The pt. is A&Ox4. Over day shift his Na+ levels have been stable, discussed POC with pt. to continue Q6 Na+ checks to ensure his levels remain stable.     Pt. L knee swelling has resolved, though his ankle and foot remain significantly more swollen than the R side. L knee, ankle, and foot are no longer tender on palpation. Pt. ROM with knee, ankle, and toes has increased, and pt. reports less pain tonight.     At this time pt. states no needs.

## 2020-04-27 NOTE — PROGRESS NOTES
Daily Progress Note:     Date of Service: 4/27/2020  Primary Team: [facility] Green Team   Attending: EBER Alfred M.D.   Senior Resident: Dr. Jolly  Intern: Dr. Araya  Contact:  [phone]    Chief Complaint:   Chief Complaint   Patient presents with   • Headache     Posterior HA started at 1730 today with posterior neck pain   • Neck Pain     with associated nausea       ID:  60-year-old male with PMH HTN initially presented 4/16/2020 with SAH and hypertensive emergency, was admitted to ICU on nicardipine drip, nimodipine and Vasotec.  No source of bleed or aneurysm was identified on cerebral angiography, and patient did not require any neurosurgical intervention.  Was transferred to floor 4/18/2020 where he continued to have resistant hypertension, however had to be transferred back to the ICU on 4/20/20 in view of cerebral salt wasting requiring hypertonic saline drip.     ICU course: Patient continued to have resistant hyponatremia, finally sodium plateaued at 129-130 on 50 cc/hour hypertonic saline, salt tablets 4 g 3 times daily with addition of fludrocortisone.  Patient had to be restarted on nicardipine drip for uncontrolled hypertension, and was eventually able to be weaned off with blood pressure controlled with oral antihypertensives.  4/23/2020: Transferred to floor    Subjective:   Resting comfortably. Ankle pain almost gone. Able to ambulate well.     Interval updates:  -Sodium ranged in the past 24 hours 133-136.  Discontinued fludrocortisone: will monitor for hypotension/ adrenal insufficiency. Will check 8am cortisol.  -Blood pressure well controlled with hydralazine decreased frequency.      Consultants/Specialty:  NA  Review of Systems:    Review of Systems   Constitutional: Negative for chills and fever.   HENT: Negative for hearing loss and tinnitus.    Eyes: Negative for blurred vision and double vision.   Respiratory: Negative for cough, sputum production and shortness of breath.     Cardiovascular: Positive for leg swelling (Ankle swelling significantly improved). Negative for chest pain and orthopnea.   Gastrointestinal: Negative for abdominal pain, diarrhea, heartburn, nausea and vomiting.   Genitourinary: Negative for dysuria and urgency.   Musculoskeletal: Negative for back pain, myalgias and neck pain.   Skin: Negative for rash.   Neurological: Negative for dizziness, sensory change, speech change, focal weakness and headaches.   Endo/Heme/Allergies: Does not bruise/bleed easily.   Psychiatric/Behavioral: Negative for depression and suicidal ideas.       Objective Data:   Physical Exam:   Vitals:   Temp:  [36.5 °C (97.7 °F)-37.4 °C (99.4 °F)] 36.9 °C (98.4 °F)  Pulse:  [60-69] 63  Resp:  [15-18] 16  BP: (110-158)/(55-73) 134/69  SpO2:  [91 %-97 %] 95 %    Physical Exam  Constitutional:       General: He is not in acute distress.  HENT:      Head: Normocephalic and atraumatic.      Nose: Nose normal.      Mouth/Throat:      Mouth: Mucous membranes are moist.      Pharynx: Oropharynx is clear.   Eyes:      Extraocular Movements: Extraocular movements intact.      Conjunctiva/sclera: Conjunctivae normal.      Pupils: Pupils are equal, round, and reactive to light.   Neck:      Musculoskeletal: Normal range of motion and neck supple.   Cardiovascular:      Rate and Rhythm: Normal rate and regular rhythm.      Pulses: Normal pulses.      Heart sounds: Normal heart sounds. No murmur.   Pulmonary:      Effort: Pulmonary effort is normal. No respiratory distress.      Breath sounds: Normal breath sounds. No wheezing or rales.   Abdominal:      General: Bowel sounds are normal. There is no distension.      Palpations: Abdomen is soft.      Tenderness: There is no abdominal tenderness.   Musculoskeletal: Normal range of motion.      Left lower leg: No edema.      Comments: Left ankle swelling significantly improved, nontender to palpation, not warm or erythematous.  Range of motion significantly  improved from yesterday.  Distal pulses palpable pedal.  Capillary refill less than 2 seconds.  Sensation intact.   Skin:     General: Skin is warm and dry.      Capillary Refill: Capillary refill takes less than 2 seconds.   Neurological:      General: No focal deficit present.      Mental Status: He is alert and oriented to person, place, and time.      Cranial Nerves: No cranial nerve deficit.      Sensory: No sensory deficit.      Motor: No weakness.      Coordination: Coordination normal.   Psychiatric:         Mood and Affect: Mood normal.         Behavior: Behavior normal.         Thought Content: Thought content does not include homicidal or suicidal ideation.         Judgment: Judgment normal.         Quality Measures  Quality-Core Measures   Reviewed items::  Labs reviewed and Medications reviewed  Cardona catheter::  No Cardona  DVT prophylaxis pharmacological::  Contraindicated - High bleeding risk  DVT prophylaxis - mechanical:  SCDs  Ulcer Prophylaxis::  Yes  Assessed for rehabilitation services:  Patient was assess for and/or received rehabilitation services during this hospitalization      Labs:   No results for input(s): WBC, RBC, HEMOGLOBIN, HEMATOCRIT, MCV, MCH, RDW, PLATELETCT, MPV, NEUTSPOLYS, LYMPHOCYTES, MONOCYTES, EOSINOPHILS, BASOPHILS, RBCMORPHOLO in the last 72 hours.   Recent Labs     04/26/20 2059 04/27/20  0425 04/27/20  0842   SODIUM 136 133* 135   POTASSIUM 3.5* 3.4* 3.5*   CHLORIDE 103 99 99   CO2 21 23 22   GLUCOSE 124* 125* 175*   BUN 14 11 10      Recent Labs     04/26/20 2059 04/27/20  0425 04/27/20  0842   CREATININE 0.75 0.69 0.84      Lab Results   Component Value Date/Time    PROTHROMBTM 13.3 04/17/2020 02:55 AM    INR 0.99 04/17/2020 02:55 AM         Imaging:   US-RENAL ARTERY DUPLEX COMP   Final Result      IR-CAROTID-CEREBRAL BILATERAL   Final Result         1.  Normal cerebral arteriogram. No evidence of aneurysm.   2.  The origin of the left posterior inferior cerebellar  artery is cut off on the lateral view and largely obscured on the AP view due to overlapping structures. This was not noticed during the procedure, only at the time of final interpretation.    Attention to this location on follow-up CTA or MRA is recommended.      CT-CTA NECK WITH & W/O-POST PROCESSING   Final Result      Mild carotid and vertebral artery atherosclerosis without significant stenosis, aneurysm or occlusion      Mild bronchial wall thickening and groundglass could indicate edema. Reactive airways disease/infection is not excluded      CT-CTA HEAD WITH & W/O-POST PROCESS   Final Result      CT angiogram of the [place] is unable to identify source of subarachnoid hemorrhage      Slight new visualization of acute subarachnoid hemorrhage in the lateral ventricles and there is slight temporal lobe dilatation. Attention in follow-up is advised      Otherwise stable moderate acute subarachnoid hemorrhage filling the basal cisterns as above      OUTSIDE IMAGES-CT HEAD   Final Result           * Hyponatremia  Assessment & Plan  Due to cerebral salt wasting syndrome. Stabilized on hypertonic saline.  4/26 Sodium in the past 24 hours ranged between 131-138.    Discontinued hypertonic saline    -We will continue to trend sodium every 12 hourly.  If sodium falls to less than 130, will restart hypertonic saline.  -Discontinue fludrocortisone : will monitor for hypotension/ adrenal insufficiency. Will check 8am cortisol tomorrow.  -Continue salt tabs 4g TID  -Continue free water restriction         Hypertensive emergency- (present on admission)  Assessment & Plan  The patient initially presented with a BP of 188/135. This may be contributing to SAH.   Continued hypertension requiring nicardipine, titration of oral antihypertensives.  Renal artery duplex was negative for renal artery stenosis.Aldosterone and plasma renin Wnl.  Patient also has anxiety, and suffers from insomnia.  This could potentially be  contribute to his high blood pressure. Wife reports that he had been in a heated argument on the phone directly prior to onset of symptoms.  He does not want to take any narcotics, sedatives.  He also has a history of 2-3 drinks, hard liquor every day.   Blood pressure systolics between 130-1 50.     - On hydralazine to 25 mg 3 times daily, amlodipine 10, Coreg 25 twice daily, lisinopril 40 mg daily  - Titrate oral antihypertensives: increase carvedilol dose, if insufficient increase hydralazine dose  - PRN antihypertensives ordered: Vasotec, labetalol, hydralazine  - Goal SBP<160 per neurosurgery  - Close blood pressure monitoring    Non-aneurysmal perimesencephalic subarachnoid hemorrhage (HCC)- (present on admission)  Assessment & Plan  Presented on 2020 with neck stiffness, headaches, and nausea/vomiting; wife reports that he had been in a heated argument on the phone directly prior to onset of symptoms. Non-con CT showed a SAH. Subsequent CTA did not show an aneurysm or source of SAH (although the patient does have a family history of aneurysms). He does have a history of hypertension, but was not on any anti-hypertensive medications prior to admission. He is on aspirin but no other anti-coagulation.  Cerebral Angiography: negative for aneurysm  C/b cerebral salt wasting syndrome.  [employer] gradin   PT recommended outpatient physical therapy    - Elevate head of bed  - fall/seizure/aspiration precautions  - Holding aspirin   - SBP <160 per neurosurgery  - Stable from neurosurgery point  - Repeat CTA in 6 weeks, patient will follow in [place] with [facility]    Hypokalemia  Assessment & Plan  Monitor and replete prn    Alcohol abuse, daily use- (present on admission)  Assessment & Plan  The patient drinks alcohol daily (usually hard liquor, 2 to 3 glasses) and uses marijuana occasionally.   No evidence of withdrawal this admission.   - cessation counseling and education  provided    Headache- (present on admission)  Assessment & Plan  2/2 SAH.  - prn tylenol  - consider gabapentin if persistent    Hypophosphatemia  Assessment & Plan  Monitor and replete prn    At risk for obstructive sleep apnea- (present on admission)  Assessment & Plan  STOP BANG score 7/8.  -Outpatient sleep study is indicated    Hypomagnesemia  Assessment & Plan  Monitor and replete prn    Pre-diabetes- (present on admission)  Assessment & Plan  A1c was 6.3.   - Continue SSI and hypoglycemia protocol  - Consider lifestyle modification counseling prior to discharge    Hyperlipidemia- (present on admission)  Assessment & Plan  The patient's LDL is elevated at 143 and total cholesterol is 209.   - Start statin when clinically appropriate      Please note that this dictation was created using voice recognition software. I have made every reasonable attempt to correct obvious errors, but there may be errors of grammar and possibly content that I did not discover before finalizing the note.

## 2020-04-28 VITALS
HEIGHT: 76 IN | SYSTOLIC BLOOD PRESSURE: 154 MMHG | OXYGEN SATURATION: 95 % | WEIGHT: 275.57 LBS | RESPIRATION RATE: 16 BRPM | BODY MASS INDEX: 33.56 KG/M2 | TEMPERATURE: 97.9 F | HEART RATE: 66 BPM | DIASTOLIC BLOOD PRESSURE: 79 MMHG

## 2020-04-28 LAB
ANION GAP SERPL CALC-SCNC: 12 MMOL/L (ref 7–16)
BUN SERPL-MCNC: 11 MG/DL (ref 8–22)
CALCIUM SERPL-MCNC: 8.6 MG/DL (ref 8.5–10.5)
CHLORIDE SERPL-SCNC: 100 MMOL/L (ref 96–112)
CO2 SERPL-SCNC: 24 MMOL/L (ref 20–33)
CORTIS SERPL-MCNC: 13.4 UG/DL (ref 0–23)
CREAT SERPL-MCNC: 0.67 MG/DL (ref 0.5–1.4)
GLUCOSE SERPL-MCNC: 169 MG/DL (ref 65–99)
POTASSIUM SERPL-SCNC: 3.6 MMOL/L (ref 3.6–5.5)
SODIUM SERPL-SCNC: 136 MMOL/L (ref 135–145)

## 2020-04-28 PROCEDURE — A9270 NON-COVERED ITEM OR SERVICE: HCPCS | Performed by: STUDENT IN AN ORGANIZED HEALTH CARE EDUCATION/TRAINING PROGRAM

## 2020-04-28 PROCEDURE — 36415 COLL VENOUS BLD VENIPUNCTURE: CPT

## 2020-04-28 PROCEDURE — 80048 BASIC METABOLIC PNL TOTAL CA: CPT

## 2020-04-28 PROCEDURE — 700102 HCHG RX REV CODE 250 W/ 637 OVERRIDE(OP): Performed by: INTERNAL MEDICINE

## 2020-04-28 PROCEDURE — 82533 TOTAL CORTISOL: CPT

## 2020-04-28 PROCEDURE — 99239 HOSP IP/OBS DSCHRG MGMT >30: CPT | Mod: GC | Performed by: HOSPITALIST

## 2020-04-28 PROCEDURE — A9270 NON-COVERED ITEM OR SERVICE: HCPCS | Performed by: INTERNAL MEDICINE

## 2020-04-28 PROCEDURE — 302196 LINEN, HYPOALLERGENIC: Performed by: INTERNAL MEDICINE

## 2020-04-28 PROCEDURE — 700102 HCHG RX REV CODE 250 W/ 637 OVERRIDE(OP): Performed by: STUDENT IN AN ORGANIZED HEALTH CARE EDUCATION/TRAINING PROGRAM

## 2020-04-28 RX ORDER — HYDRALAZINE HYDROCHLORIDE 25 MG/1
25 TABLET, FILM COATED ORAL EVERY 8 HOURS
Qty: 90 TAB | Refills: 0 | Status: SHIPPED | OUTPATIENT
Start: 2020-04-28

## 2020-04-28 RX ORDER — AMLODIPINE BESYLATE 10 MG/1
10 TABLET ORAL DAILY
Qty: 30 TAB | Refills: 0 | Status: SHIPPED | OUTPATIENT
Start: 2020-04-29

## 2020-04-28 RX ORDER — SODIUM CHLORIDE 1 G/1
1 TABLET ORAL DAILY
Qty: 7 TAB | Refills: 0 | Status: SHIPPED | OUTPATIENT
Start: 2020-04-28

## 2020-04-28 RX ORDER — LISINOPRIL 40 MG/1
40 TABLET ORAL DAILY
Qty: 30 TAB | Refills: 0 | Status: SHIPPED | OUTPATIENT
Start: 2020-04-29

## 2020-04-28 RX ORDER — SODIUM CHLORIDE 1 G/1
1 TABLET ORAL ONCE
Status: COMPLETED | OUTPATIENT
Start: 2020-04-28 | End: 2020-04-28

## 2020-04-28 RX ORDER — FLUTICASONE PROPIONATE 50 MCG
2 SPRAY, SUSPENSION (ML) NASAL
Qty: 16 G | Refills: 0 | Status: SHIPPED | OUTPATIENT
Start: 2020-04-28

## 2020-04-28 RX ORDER — CARVEDILOL 25 MG/1
25 TABLET ORAL 2 TIMES DAILY WITH MEALS
Qty: 60 TAB | Refills: 0 | Status: SHIPPED | OUTPATIENT
Start: 2020-04-28

## 2020-04-28 RX ADMIN — ACETAMINOPHEN 650 MG: 325 TABLET, FILM COATED ORAL at 13:11

## 2020-04-28 RX ADMIN — ACETAMINOPHEN 650 MG: 325 TABLET, FILM COATED ORAL at 09:20

## 2020-04-28 RX ADMIN — CARVEDILOL 25 MG: 6.25 TABLET, FILM COATED ORAL at 07:45

## 2020-04-28 RX ADMIN — ACETAMINOPHEN 650 MG: 325 TABLET, FILM COATED ORAL at 02:49

## 2020-04-28 RX ADMIN — AMLODIPINE BESYLATE 10 MG: 10 TABLET ORAL at 04:28

## 2020-04-28 RX ADMIN — LISINOPRIL 40 MG: 20 TABLET ORAL at 09:21

## 2020-04-28 RX ADMIN — Medication 1 G: at 07:45

## 2020-04-28 RX ADMIN — HYDRALAZINE HYDROCHLORIDE 25 MG: 25 TABLET, FILM COATED ORAL at 13:08

## 2020-04-28 RX ADMIN — HYDRALAZINE HYDROCHLORIDE 25 MG: 25 TABLET, FILM COATED ORAL at 04:28

## 2020-04-28 ASSESSMENT — ENCOUNTER SYMPTOMS
BACK PAIN: 0
FOCAL WEAKNESS: 0
TINGLING: 0
SPUTUM PRODUCTION: 0
VOMITING: 0
BRUISES/BLEEDS EASILY: 0
PALPITATIONS: 0
COUGH: 0
BLURRED VISION: 0
HEARTBURN: 0
DIZZINESS: 0
SENSORY CHANGE: 0
HEADACHES: 0
TREMORS: 0
MYALGIAS: 0
DEPRESSION: 0
DIARRHEA: 0
DOUBLE VISION: 0
FEVER: 0
CHILLS: 0
WEAKNESS: 0
SPEECH CHANGE: 0

## 2020-04-28 ASSESSMENT — COGNITIVE AND FUNCTIONAL STATUS - GENERAL
SUGGESTED CMS G CODE MODIFIER MOBILITY: CJ
MOBILITY SCORE: 21
STANDING UP FROM CHAIR USING ARMS: A LITTLE
CLIMB 3 TO 5 STEPS WITH RAILING: A LITTLE
DAILY ACTIVITIY SCORE: 24
SUGGESTED CMS G CODE MODIFIER DAILY ACTIVITY: CH
WALKING IN HOSPITAL ROOM: A LITTLE

## 2020-04-28 NOTE — PROGRESS NOTES
Discharge paperwork reviewed with patient. In depth discussion about hypertension medications and when to take next doses completed. All questions answered at this time. IV removed and patient belongings given to patient. CNA transported patient via wheelchair to wife at [facility]. Patient DME wheelchair taken with patient.

## 2020-04-28 NOTE — CARE PLAN
Problem: Discharge Barriers/Planning  Goal: Patient's continuum of care needs will be met  Outcome: PROGRESSING AS EXPECTED  Intervention: Involve patient and significant other/support system in setting and prioritizing goals for hospital stay and discharge  Note: Pt states family involved in care.      Problem: Self-Care:  Goal: Ability to participate in self-care as condition permits will improve  Outcome: PROGRESSING AS EXPECTED  Intervention: Assess ability to perform activities of daily living  Flowsheets  Taken 4/27/2020 0820 by Morenita Alonso  Bathing:   Shower   Staff  Taken 4/27/2020 2200 by Sherine Stuart R.N.  Oral Care: Refused  Note: Pt encouraged to perform self care as much as possible.

## 2020-04-28 NOTE — FACE TO FACE
Face to Face Note  -  Durable Medical Equipment    Rhiannon Araya M.D. - NPI: [id number]  I certify that this patient is under my care and that they had a durable medical equipment(DME)face to face encounter by myself that meets the physician DME face-to-face encounter requirements with this patient on:    Date of encounter:   Patient:                    MRN:                       YOB: 2019  J Luis Rae  [id number]  1960     The encounter with the patient was in whole, or in part, for the following medical condition, which is the primary reason for durable medical equipment:  CVA    I certify that, based on my findings, the following durable medical equipment is medically necessary:  Walkers.        My Clinical findings support the need for the above equipment due to:  Other - weakness after subarachnoid hemorrhage    Supporting Symptoms: Front wheel walker for generalized weakness

## 2020-04-28 NOTE — DISCHARGE INSTRUCTIONS
Discharge Instructions    Discharged to home by car with relative. Discharged via wheelchair, hospital escort: Yes.  Special equipment needed: Walker    Be sure to schedule a follow-up appointment with your primary care doctor or any specialists as instructed.     Discharge Plan:   Influenza Vaccine Indication: Patient Refuses    I understand that a diet low in cholesterol, fat, and sodium is recommended for good health. Unless I have been given specific instructions below for another diet, I accept this instruction as my diet prescription.   Other diet: Regular    Special Instructions: None    · Is patient discharged on Warfarin / Coumadin?   No     Depression / Suicide Risk    As you are discharged from this [facility] facility, it is important to learn how to keep safe from harming yourself.    Recognize the warning signs:  · Abrupt changes in personality, positive or negative- including increase in energy   · Giving away possessions  · Change in eating patterns- significant weight changes-  positive or negative  · Change in sleeping patterns- unable to sleep or sleeping all the time   · Unwillingness or inability to communicate  · Depression  · Unusual sadness, discouragement and loneliness  · Talk of wanting to die  · Neglect of personal appearance   · Rebelliousness- reckless behavior  · Withdrawal from people/activities they love  · Confusion- inability to concentrate     If you or a loved one observes any of these behaviors or has concerns about self-harm, here's what you can do:  · Talk about it- your feelings and reasons for harming yourself  · Remove any means that you might use to hurt yourself (examples: pills, rope, extension cords, firearm)  · Get professional help from the community (Mental Health, Substance Abuse, psychological counseling)  · Do not be alone:Call your Safe Contact- someone whom you trust who will be there for you.  · Call your local CRISIS HOTLINE 037-2539 or 498-267-8818  · Call  your local Children's Mobile Crisis Response Team [place] (325) 729-3267 or www.[employer].the grafter  · Call the toll free National Suicide Prevention Hotlines   · National Suicide Prevention Lifeline 199-474-AVXS (8765)  · National Hope Line Network 800-SUICIDE (876-3720)

## 2020-04-28 NOTE — PROGRESS NOTES
Daily Progress Note:     Date of Service: 4/28/2020  Primary Team: [facility] Green Team   Attending: Dr. Alfred  Senior Resident: Dr. Jolly  Intern: Dr. Araya  Contact:  [phone]    ID:  60-year-old male with PMH HTN initially presented 4/16/2020 with SAH and hypertensive emergency, was admitted to ICU on nicardipine drip, nimodipine and Vasotec.  No source of bleed or aneurysm was identified on cerebral angiography, and patient did not require any neurosurgical intervention.  Was transferred to floor 4/18/2020 where he continued to have resistant hypertension, however had to be transferred back to the ICU on 4/20/20 in view of cerebral salt wasting requiring hypertonic saline drip.     ICU course: Patient continued to have resistant hyponatremia, finally sodium plateaued at 129-130 on 50 cc/hour hypertonic saline, salt tablets 4 g 3 times daily with addition of fludrocortisone.  Patient had to be restarted on nicardipine drip for uncontrolled hypertension, and was eventually able to be weaned off with blood pressure controlled with oral antihypertensives.  4/23/2020: Transferred to floor    Subjective:   No complaints this am.    Interval updates:  Sodium and BP stable. Medically stable for discharge.    Consultants/Specialty:  NA    Review of Systems:    Review of Systems   Constitutional: Negative for chills and fever.   HENT: Negative for hearing loss and tinnitus.    Eyes: Negative for blurred vision and double vision.   Respiratory: Negative for cough and sputum production.    Cardiovascular: Negative for chest pain and palpitations.   Gastrointestinal: Negative for diarrhea, heartburn and vomiting.   Genitourinary: Negative for dysuria and urgency.   Musculoskeletal: Negative for back pain and myalgias.   Skin: Negative for rash.   Neurological: Negative for dizziness, tingling, tremors, sensory change, speech change, focal weakness, weakness and headaches.   Endo/Heme/Allergies: Does not bruise/bleed  easily.   Psychiatric/Behavioral: Negative for depression and suicidal ideas.       Objective Data:   Physical Exam:   Vitals:   BP: ()/()     Physical Exam  Constitutional:       General: He is not in acute distress.  HENT:      Head: Normocephalic and atraumatic.      Nose: Nose normal.      Mouth/Throat:      Mouth: Mucous membranes are moist.      Pharynx: Oropharynx is clear.   Eyes:      Conjunctiva/sclera: Conjunctivae normal.      Pupils: Pupils are equal, round, and reactive to light.   Neck:      Musculoskeletal: Normal range of motion and neck supple.   Cardiovascular:      Rate and Rhythm: Normal rate and regular rhythm.      Pulses: Normal pulses.      Heart sounds: Normal heart sounds.   Pulmonary:      Effort: Pulmonary effort is normal.      Breath sounds: Normal breath sounds.   Abdominal:      General: Bowel sounds are normal.      Palpations: Abdomen is soft.      Tenderness: There is no abdominal tenderness.   Musculoskeletal: Normal range of motion.         General: No swelling or tenderness.   Skin:     General: Skin is warm and dry.      Capillary Refill: Capillary refill takes less than 2 seconds.   Neurological:      General: No focal deficit present.      Mental Status: He is alert and oriented to person, place, and time.      Cranial Nerves: No cranial nerve deficit.      Sensory: No sensory deficit.      Motor: No weakness.      Coordination: Coordination normal.   Psychiatric:         Mood and Affect: Mood normal.         Behavior: Behavior normal.         Thought Content: Thought content does not include homicidal or suicidal ideation.         Quality Measures  Quality-Core Measures   Reviewed items::  Labs reviewed and Medications reviewed  Cardona catheter::  No Cardona  DVT prophylaxis pharmacological::  Contraindicated - High bleeding risk  DVT prophylaxis - mechanical:  SCDs  Ulcer Prophylaxis::  Not indicated  Assessed for rehabilitation services:  Patient was assess for and/or  received rehabilitation services during this hospitalization      Labs:   No results for input(s): WBC, RBC, HEMOGLOBIN, HEMATOCRIT, MCV, MCH, RDW, PLATELETCT, MPV, NEUTSPOLYS, LYMPHOCYTES, MONOCYTES, EOSINOPHILS, BASOPHILS, RBCMORPHOLO in the last 72 hours.   Recent Labs     04/27/20  0842 04/27/20 2024 04/28/20  0908   SODIUM 135 137 136   POTASSIUM 3.5* 3.7 3.6   CHLORIDE 99 104 100   CO2 22 23 24   GLUCOSE 175* 138* 169*   BUN 10 15 11      Recent Labs     04/27/20  0842 04/27/20 2024 04/28/20  0908   CREATININE 0.84 0.72 0.67      Lab Results   Component Value Date/Time    PROTHROMBTM 13.3 04/17/2020 02:55 AM    INR 0.99 04/17/2020 02:55 AM         Imaging:   No orders to display        * Hyponatremia  Assessment & Plan  Due to cerebral salt wasting syndrome. Stabilized on hypertonic saline.  4/26 Sodium in the past 24 hours ranged between 131-138.    Discontinued hypertonic saline and flurinef     Salt tablet 1 gm OD X 7 days  Repeat BMP in 7 days  F/u with PCP    Hypertensive emergency- (present on admission)  Assessment & Plan  The patient initially presented with a BP of 188/135. This may be contributing to SAH.   Continued hypertension requiring nicardipine, titration of oral antihypertensives.  Renal artery duplex was negative for renal artery stenosis.Aldosterone and plasma renin Wnl.  Patient also has anxiety, and suffers from insomnia.  This could potentially be contribute to his high blood pressure. Wife reports that he had been in a heated argument on the phone directly prior to onset of symptoms.  He does not want to take any narcotics, sedatives.  He also has a history of 2-3 drinks, hard liquor every day.   Blood pressure systolics between 130-1 50.      - On hydralazine to 25 mg 3 times daily, amlodipine 10, Coreg 25 twice daily, lisinopril 40 mg daily  - Goal SBP<160 per neurosurgery  - Close blood pressure monitoring with PCP     Non-aneurysmal perimesencephalic subarachnoid hemorrhage (HCC)-  (present on admission)  Assessment & Plan  Presented on 2020 with neck stiffness, headaches, and nausea/vomiting; wife reports that he had been in a heated argument on the phone directly prior to onset of symptoms. Non-con CT showed a SAH. Subsequent CTA did not show an aneurysm or source of SAH (although the patient does have a family history of aneurysms). He does have a history of hypertension, but was not on any anti-hypertensive medications prior to admission. He is on aspirin but no other anti-coagulation.  Cerebral Angiography: negative for aneurysm  C/b cerebral salt wasting syndrome.  [employer] gradin   PT recommended outpatient physical therapy     -F/u PCP  - Repeat CTA in 6 weeks, patient will follow in [place] with [facility]     Hypokalemia  Assessment & Plan  Monitor and replete prn     Alcohol abuse, daily use- (present on admission)  Assessment & Plan  The patient drinks alcohol daily (usually hard liquor, 2 to 3 glasses) and uses marijuana occasionally.   No evidence of withdrawal this admission.   - cessation counseling and education provided     Headache- (present on admission)  Assessment & Plan  2/2 SAH.  - prn tylenol  - consider gabapentin if persistent     Hypophosphatemia  Assessment & Plan  Monitor and replete prn     At risk for obstructive sleep apnea- (present on admission)  Assessment & Plan  STOP BANG score 7/8.  -Outpatient sleep study is indicated     Hypomagnesemia  Assessment & Plan  Monitor and replete prn     Pre-diabetes- (present on admission)  Assessment & Plan  A1c was 6.3.   - Continue SSI and hypoglycemia protocol  - Consider lifestyle modification counseling prior to discharge     Hyperlipidemia- (present on admission)  Assessment & Plan  The patient's LDL is elevated at 143 and total cholesterol is 209.   - Start statin when clinically appropriate, per PCP         Please note that this dictation was created using voice recognition software. I have  made every reasonable attempt to correct obvious errors, but there may be errors of grammar and possibly content that I did not discover before finalizing the note.

## 2020-07-07 NOTE — CARE PLAN
Problem: Safety  Goal: Will remain free from injury  Outcome: PROGRESSING AS EXPECTED  Goal: Will remain free from falls  Outcome: PROGRESSING AS EXPECTED     Problem: Infection  Goal: Will remain free from infection  Outcome: PROGRESSING AS EXPECTED     Problem: Fluid Volume:  Goal: Will maintain balanced intake and output  Outcome: PROGRESSING AS EXPECTED  Fluid restrictions in effect, strict I&O      Notification of Discharge  This is a Notification of Discharge from our facility [address]  Please be advised that this patient has been discharge from our facility  Below you will find the admission and discharge date and time including the patients disposition  PRESENTATION DATE: 6/29/2020 11:17 AM  OBS ADMISSION DATE:   IP ADMISSION DATE: 6/29/20 1117   DISCHARGE DATE: 7/6/2020  3:19 PM  DISPOSITION: Home with [facility] with [address] [facility]   Admission Orders listed below:  Admission Orders (From admission, onward)     Ordered        06/29/20 1407  Inpatient Admission  Once                   Please contact the UR Department if additional information is required to close this patient's authorization/case  [address] Utilization Review Department  Main: [phone] x carefully listen to the prompts  All voicemails are confidential   [email or web address]  Send all requests for admission clinical reviews, approved or denied determinations and any other requests to dedicated fax number below belonging to the campus where the patient is receiving treatment   List of dedicated fax numbers:  [address] DENIALS (Administrative/Medical Necessity) [phone]   [address] (Maternity/NICU/Pediatrics) [phone]   [facility] [place] [phone]   Particia Notice [phone]   Irving Pappas [phone]   [facility] [address] [facility] [phone]   [facility]  [phone]   [address] [facility] [address]  [address] [address] [address] [facility] [phone]

## 2022-07-29 NOTE — ASSESSMENT & PLAN NOTE
MY CONTACT NUMBERS ARE: [phone]  DOCTOR : NIKIA Marie  SLEEP CENTER :   CPAP EQUIPMENT :    IF I HAVE SLEEP APNEA.....  WHERE CAN I FIND MORE INFORMATION?    American Academy of Sleep Medicine Patient information on sleep disorders:  http://yoursleep.aasmnet.org    THINGS TO REMEMBER  In most situations, sleep apnea is a lifelong disease that must be managed with daily therapy. Untreated disease, when severe, may result in an increased risk for an array of problems from heart disease to mood changes, car accidents and shorter lifespan.    CPAP -  WHY AND HOW?  Continuous positive airway pressure, or CPAP, is the most effective treatment for obstructive sleep apnea. A decision to use CPAP is a major step forward in the pursuit of a healthier life. The successful use of CPAP will help you breathe easier, sleep better and live healthier. Using CPAP can be a positive experience if you keep these salvador points in mind:  Commitment  CPAP is not a quick fix for your problem. It involves a long-term commitment to improve your sleep and your health.    Communication  Stay in close communication with both your sleep doctor and your CPAP supplier. Ask lots of questions and seek help when you need it.    Consistency  Use CPAP all night, every night and for every nap. You will receive the maximum health benefits from CPAP when you use it every time that you sleep. This will also make it easier for your body to adjust to the treatment.    Correction  The first machine and mask that you try may not be the best ones for you. Work with your sleep doctor and your CPAP supplier to make corrections to your equipment selection. Ask about trying a different type of machine or mask if you have ongoing problems. Make sure that your mask is a good fit and learn to use your equipment properly.    Challenge  Tell a family member or close friend to ask you each morning if you used your CPAP the previous night. Have someone to  Weight loss to be encouraged as clinically appropriate  Monitor for CHARLES  TSH is normal   "challenge you to give it your best effort.    Connection   Your adjustment to CPAP will be easier if you are able to connect with others who use the same treatment. Ask your sleep doctor if there is a support group in your area for people who have sleep apnea, or look for one on the Internet.    Comfort   Increase your level of comfort by using a saline spray, decongestant or heated humidifier if CPAP irritates your nose, mouth or throat. Use your unit's \"ramp\" setting to slowly get used to the air pressure level. There may be soft pads you can buy that will fit over your mask straps. Look on www.CPAP.com for accessories such as these straps, a pillow contoured for side-sleeping with CPAP, longer hoses, hose covers to reduce condensation, or stands to keep the hose out of your way.                                 Cleaning   Clean your mask, tubing and headgear on a regular basis. Put this time in your schedule so that you don't forget to do it. Check and replace the filters for your CPAP unit and humidifier.    Completion   Although you are never finished with CPAP therapy, you should reward yourself by celebrating the completion of your first month of treatment. Expect this first month to be your hardest period of adjustment. It will involve some trial and error as you find the machine, mask and pressure settings that are right for you.    Continuation  After your first month of treatment, continue to make a daily commitment to use your CPAP all night, every night and for every nap.    CPAP-Tips to starting with success:  Begin using your CPAP for short periods of time during the day while you watch TV or read.    Use CPAP every night and for every nap. Using it less often reduces the health benefits and makes it harder for your body to get used to it.    Newer CPAP models are virtually silent; however, if you find the sound of your CPAP machine to be bothersome, place the unit under your bed to dampen the sound. "     Make small adjustments to your mask, tubing, straps and headgear until you get the right fit. Tightening the mask may actually worsen the leak.  If it leaves significant marks on your face or irritates the bridge of your nose, it may not be the best mask for you.  Speak with the person who supplied the mask and consider trying other masks.    Use a saline nasal spray to ease mild nasal congestion. Neti-Pot or saline nasal rinses may also help. Nasal gel sprays can help reduce nasal dryness.  Biotene mouthwash can be helpful to protect your teeth if you experience frequent dry mouth.  Dry mouth may be a sign of air escaping out of your mouth or out of the mask in the case of a full face mask.  Speak with your provider if you expect that is the case.     Take a nasal decongestant to relieve more severe nasal or sinus congestion.  Do not use Afrin (oxymetazoline) nasal spray more than 3 days in a row.  Speak with your sleep doctor if your nasal congestion is chronic.    Use a heated humidifier that fits your CPAP model to enhance your breathing comfort. Adjust the heat setting up if you get a dry nose or throat, down if you get condensation in the hose or mask.  Position the CPAP lower than you so that any condensation in the hose drains back into the machine rather than towards the mask.    Try a system that uses nasal pillows if traditional masks give you problems.    Clean your mask, tubing and headgear once a week. Make sure the equipment dries fully.    Regularly check and replace the filters for your CPAP unit and humidifier.    Work closely with your sleep doctor and your CPAP supplier to make sure that you have the machine, mask and air pressure setting that works best for you.    BESIDES CPAP, WHAT OTHER THERAPIES ARE THERE?  Postioning devices if you only have the problem on your back  Dental devices if your condition is mild  Nasal valves may be effective though experience is limited  Tongue Retaining  Device if missing teeth precludes the use of a dental device  Weight loss if you are overweight  Surgery in limited cases where devices are not acceptable or there are problems with structures in the nose and throat  If treated with one of these alternative options, further evaluation is necessary to ensure that the therapy is effective. This may require some form of testing.     Healthy Lifestyle:  Healthy diet, exercise and Limit alcohol: Not only will excessive alcohol increase your weight over time, but it irritates the throat tissues and make them swell, shrinking the airway and causing snoring. Drinking alcohol should be limited and stopped within 3-4 hours before going to bed.   Stop smoking: (Red swollen throat, heat, nicotine), also irritates and swells the airway, among numerous other negative health consequences.    Positioning Device  This example shows a pillow that straps around the waist. It may be appropriate for those whose sleep study shows milder sleep apnea that occurs primarily when lying flat on one's back. Preliminary studies have shown benefit but effectiveness at home should be verified.    Nasal Valves              Nasal valves may not be effective if you have frequent nasal congestion or have difficulty breathing through your nose. They may be an option for mild apnea if other options are not well tolerated. The efficacy of these devices is generally less than CPAP or oral appliances.  Oral Appliance  These are examples of two of many custom-made devices that are more likely to work in mild sleep apnea  Oral appliances are dental mouth pieces that fit very much like a sports mouth guards or removable orthodontic retainers. They are used to treat snoring  And obstructive sleep apnea . The device prevents the airway from collapsing by either holding the tongue or supporting the jaw in a forward position. Since oral appliances are non-invasive and easy to use, they may be considered as an  early treatment option. Oral appliance therapy (OAT) involves the customization, selection, fabrication, fitting, adjustments and long-term follow-up care of specially designed oral devices, worn during sleep, which reposition the lower jaw and tongue base forward to maintain an open airway.  Custom made oral appliances are proven to be more effective than over-the-counter devices. Therefore, the over-the-counter devices are recommended not to be used as a screening tool nor as a therapeutic option.  Who gets a dental device?  Oral appliance therapy can be used as an alternative to  CPAP therapy for the treatment of mild to moderate sleep apnea and for those patients who prefer OAT to CPAP. Oral appliance therapy is a first line therapy for the treatment of primary snoring. Additionally, OAT is an option for those that cannot tolerate CPAP as therapy or who have experienced insufficient surgical results.    Possible side effects?  Frequent but minor side effects include: excessive salivation, dry mouth, discomfort of teeth and jaw and temporary changes in the patient s bite.  Potential complications include: jaw pain, permanent occlusal changes and TMJ symptoms.  The above mentioned side effects and complications can be recognized and managed by dentists trained in dental sleep medicine.    Finding a dentist that practices dental sleep medicine  Specific training is available through the American Academy of Dental Sleep Medicine for dentists interested in working in the field of sleep. To find a dentist who is educated in the field of sleep and the use of oral appliances, near you, visit the Web site of the American Academy of Dental Sleep Medicine; also see http://www.accpstorage.org/newOrganization/patients/oralAppliances.pdf   To search for a dentist certified in these practices:  Http://aadsm.org/FindADentist.aspx?1  Http://www.accpstorage.org/newOrganization/patients/oralAppliances.pdf    Tongue Retaining  Device               Tongue Retaining Devices are devices that generally  suction cup  onto the tongue preventing it from falling back into the back of the throat during sleep.  They may be an option for people missing teeth, but can be uncomfortable. This particular device can be purchased online, but similar devices made by dentists fit more precisely and may be tolerated better. In general, they are rarely effective and often not very well tolerated.    Weight Loss:  Some patients may experience reduction or elimination of sleep apnea with weight loss.  Though there are significant health benefits from weight loss, long-term weight loss is very difficult to achieve- studies show success with dietary management in less that 10% of people.     If you are interested in dietary weight loss, you should review the options discussed at the National Institutes of Health patient information site:     Http:/www.health.nih.gov/topic/WeightLossDieting    Bariatric programs offer counseling in all methods of weight loss:    Http:/www.[facility].org/Specialties/WeightLossSurgeryandMedicalMgmt/htm    Surgery:  There are a number of surgeries that have been attempted to treat apnea. In general, surgical options are usually reserved for cases in which there is a physical abnormality contributing to obstruction or other treatment options are ineffective or not tolerated. Most surgical options are either unreliable or quite invasive. One of the more common procedures is:  Uvulopalatopharyngoplasty: In this procedure, the uvula (the finger-like tissue that hangs in the back of the throat), part of the soft palate (the tissue that the uvula is attached to), and sometimes the tonsils or adenoids are removed. The efficacy of this surgery is around 30-50% .  After surgery, complications may include:  Sleepiness and sleep apnea related to post-surgery medication   Swelling, infection and bleeding   A sore throat and/or difficulty  "swallowing   Drainage of secretions into the nose and a nasal quality to the voice. English language speech does not seem to be affected by this surgery.   Narrowing of the airway in the nose and throat (hence constricting breathing) snoring and even iatrogenically caused sleep apnea. By cutting the tissues, excess scar tissue can \"tighten\" the airway and make it even smaller than it was before UPPP.  Patients who have had the uvula removed will become unable to correctly speak Wolof or any other language that has a uvular 'r' phoneme.    Surgeries to help resolve nasal congestion may help reduce the severity of apnea slightly. Nasal congestion does not cause apnea on its own, so these surgeries are usually not performed just for MIGUEL A.  They may be worth considering if the nasal congestion is significantly bothersome independent of apnea.   "